# Patient Record
Sex: MALE | Race: WHITE | NOT HISPANIC OR LATINO | Employment: UNEMPLOYED | URBAN - METROPOLITAN AREA
[De-identification: names, ages, dates, MRNs, and addresses within clinical notes are randomized per-mention and may not be internally consistent; named-entity substitution may affect disease eponyms.]

---

## 2021-09-24 ENCOUNTER — APPOINTMENT (EMERGENCY)
Dept: RADIOLOGY | Facility: HOSPITAL | Age: 61
End: 2021-09-24

## 2021-09-24 ENCOUNTER — HOSPITAL ENCOUNTER (OUTPATIENT)
Facility: HOSPITAL | Age: 61
Setting detail: OBSERVATION
Discharge: HOME | End: 2021-09-25
Attending: EMERGENCY MEDICINE | Admitting: INTERNAL MEDICINE

## 2021-09-24 DIAGNOSIS — R06.03 RESPIRATORY DISTRESS: ICD-10-CM

## 2021-09-24 DIAGNOSIS — I83.93 VARICOSE VEINS OF BOTH LOWER EXTREMITIES, UNSPECIFIED WHETHER COMPLICATED: ICD-10-CM

## 2021-09-24 DIAGNOSIS — J45.909 REACTIVE AIRWAY DISEASE: Primary | ICD-10-CM

## 2021-09-24 DIAGNOSIS — J20.9 ACUTE BRONCHITIS, UNSPECIFIED ORGANISM: ICD-10-CM

## 2021-09-24 PROBLEM — F20.9 SCHIZOPHRENIA (HCC): Status: ACTIVE | Noted: 2021-09-24

## 2021-09-24 PROBLEM — E87.6 HYPOKALEMIA: Status: ACTIVE | Noted: 2021-09-24

## 2021-09-24 PROBLEM — D72.829 LEUKOCYTOSIS: Status: ACTIVE | Noted: 2021-09-24

## 2021-09-24 LAB
ANION GAP SERPL CALCULATED.3IONS-SCNC: 8 MMOL/L (ref 4–13)
BASOPHILS # BLD AUTO: 0.1 THOUSANDS/ΜL (ref 0–0.1)
BASOPHILS NFR BLD AUTO: 1 % (ref 0–1)
BUN SERPL-MCNC: 15 MG/DL (ref 5–25)
CALCIUM SERPL-MCNC: 9 MG/DL (ref 8.3–10.1)
CHLORIDE SERPL-SCNC: 102 MMOL/L (ref 100–108)
CO2 SERPL-SCNC: 30 MMOL/L (ref 21–32)
CREAT SERPL-MCNC: 1.14 MG/DL (ref 0.6–1.3)
EOSINOPHIL # BLD AUTO: 0.42 THOUSAND/ΜL (ref 0–0.61)
EOSINOPHIL NFR BLD AUTO: 3 % (ref 0–6)
ERYTHROCYTE [DISTWIDTH] IN BLOOD BY AUTOMATED COUNT: 13.4 % (ref 11.6–15.1)
GFR SERPL CREATININE-BSD FRML MDRD: 69 ML/MIN/1.73SQ M
GLUCOSE SERPL-MCNC: 104 MG/DL (ref 65–140)
HCT VFR BLD AUTO: 45.5 % (ref 36.5–49.3)
HGB BLD-MCNC: 15.4 G/DL (ref 12–17)
IMM GRANULOCYTES # BLD AUTO: 0.04 THOUSAND/UL (ref 0–0.2)
IMM GRANULOCYTES NFR BLD AUTO: 0 % (ref 0–2)
LYMPHOCYTES # BLD AUTO: 2.41 THOUSANDS/ΜL (ref 0.6–4.47)
LYMPHOCYTES NFR BLD AUTO: 20 % (ref 14–44)
MAGNESIUM SERPL-MCNC: 2.1 MG/DL (ref 1.6–2.6)
MCH RBC QN AUTO: 31.6 PG (ref 26.8–34.3)
MCHC RBC AUTO-ENTMCNC: 33.8 G/DL (ref 31.4–37.4)
MCV RBC AUTO: 93 FL (ref 82–98)
MONOCYTES # BLD AUTO: 1.46 THOUSAND/ΜL (ref 0.17–1.22)
MONOCYTES NFR BLD AUTO: 12 % (ref 4–12)
NEUTROPHILS # BLD AUTO: 7.87 THOUSANDS/ΜL (ref 1.85–7.62)
NEUTS SEG NFR BLD AUTO: 64 % (ref 43–75)
NRBC BLD AUTO-RTO: 0 /100 WBCS
PLATELET # BLD AUTO: 348 THOUSANDS/UL (ref 149–390)
PMV BLD AUTO: 10.1 FL (ref 8.9–12.7)
POTASSIUM SERPL-SCNC: 3.1 MMOL/L (ref 3.5–5.3)
RBC # BLD AUTO: 4.87 MILLION/UL (ref 3.88–5.62)
SARS-COV-2 RNA RESP QL NAA+PROBE: NEGATIVE
SODIUM SERPL-SCNC: 140 MMOL/L (ref 136–145)
TROPONIN I SERPL-MCNC: <0.02 NG/ML
WBC # BLD AUTO: 12.3 THOUSAND/UL (ref 4.31–10.16)

## 2021-09-24 PROCEDURE — 71045 X-RAY EXAM CHEST 1 VIEW: CPT

## 2021-09-24 PROCEDURE — 96374 THER/PROPH/DIAG INJ IV PUSH: CPT

## 2021-09-24 PROCEDURE — 83735 ASSAY OF MAGNESIUM: CPT | Performed by: PHYSICIAN ASSISTANT

## 2021-09-24 PROCEDURE — 93005 ELECTROCARDIOGRAM TRACING: CPT

## 2021-09-24 PROCEDURE — 99285 EMERGENCY DEPT VISIT HI MDM: CPT | Performed by: EMERGENCY MEDICINE

## 2021-09-24 PROCEDURE — 84484 ASSAY OF TROPONIN QUANT: CPT | Performed by: EMERGENCY MEDICINE

## 2021-09-24 PROCEDURE — 80048 BASIC METABOLIC PNL TOTAL CA: CPT | Performed by: EMERGENCY MEDICINE

## 2021-09-24 PROCEDURE — U0003 INFECTIOUS AGENT DETECTION BY NUCLEIC ACID (DNA OR RNA); SEVERE ACUTE RESPIRATORY SYNDROME CORONAVIRUS 2 (SARS-COV-2) (CORONAVIRUS DISEASE [COVID-19]), AMPLIFIED PROBE TECHNIQUE, MAKING USE OF HIGH THROUGHPUT TECHNOLOGIES AS DESCRIBED BY CMS-2020-01-R: HCPCS | Performed by: EMERGENCY MEDICINE

## 2021-09-24 PROCEDURE — 94640 AIRWAY INHALATION TREATMENT: CPT

## 2021-09-24 PROCEDURE — 99220 PR INITIAL OBSERVATION CARE/DAY 70 MINUTES: CPT | Performed by: PHYSICIAN ASSISTANT

## 2021-09-24 PROCEDURE — 99285 EMERGENCY DEPT VISIT HI MDM: CPT

## 2021-09-24 PROCEDURE — U0005 INFEC AGEN DETEC AMPLI PROBE: HCPCS | Performed by: EMERGENCY MEDICINE

## 2021-09-24 PROCEDURE — 94760 N-INVAS EAR/PLS OXIMETRY 1: CPT

## 2021-09-24 PROCEDURE — 36415 COLL VENOUS BLD VENIPUNCTURE: CPT | Performed by: EMERGENCY MEDICINE

## 2021-09-24 PROCEDURE — 85025 COMPLETE CBC W/AUTO DIFF WBC: CPT | Performed by: EMERGENCY MEDICINE

## 2021-09-24 RX ORDER — POTASSIUM CHLORIDE 20 MEQ/1
40 TABLET, EXTENDED RELEASE ORAL ONCE
Status: COMPLETED | OUTPATIENT
Start: 2021-09-24 | End: 2021-09-24

## 2021-09-24 RX ORDER — ALBUTEROL SULFATE 90 UG/1
8 AEROSOL, METERED RESPIRATORY (INHALATION) ONCE
Status: COMPLETED | OUTPATIENT
Start: 2021-09-24 | End: 2021-09-24

## 2021-09-24 RX ORDER — AZITHROMYCIN 500 MG/1
500 TABLET, FILM COATED ORAL EVERY 24 HOURS
Status: DISCONTINUED | OUTPATIENT
Start: 2021-09-24 | End: 2021-09-25 | Stop reason: HOSPADM

## 2021-09-24 RX ORDER — LEVALBUTEROL 1.25 MG/.5ML
1.25 SOLUTION, CONCENTRATE RESPIRATORY (INHALATION) 3 TIMES DAILY
Status: DISCONTINUED | OUTPATIENT
Start: 2021-09-25 | End: 2021-09-25 | Stop reason: HOSPADM

## 2021-09-24 RX ORDER — HYDRALAZINE HYDROCHLORIDE 20 MG/ML
5 INJECTION INTRAMUSCULAR; INTRAVENOUS ONCE
Status: COMPLETED | OUTPATIENT
Start: 2021-09-25 | End: 2021-09-25

## 2021-09-24 RX ORDER — ACETAMINOPHEN 325 MG/1
650 TABLET ORAL EVERY 6 HOURS PRN
Status: DISCONTINUED | OUTPATIENT
Start: 2021-09-24 | End: 2021-09-25 | Stop reason: HOSPADM

## 2021-09-24 RX ORDER — CALCIUM CARBONATE 200(500)MG
1000 TABLET,CHEWABLE ORAL DAILY PRN
Status: DISCONTINUED | OUTPATIENT
Start: 2021-09-24 | End: 2021-09-25 | Stop reason: HOSPADM

## 2021-09-24 RX ORDER — METHYLPREDNISOLONE SODIUM SUCCINATE 125 MG/2ML
125 INJECTION, POWDER, LYOPHILIZED, FOR SOLUTION INTRAMUSCULAR; INTRAVENOUS ONCE
Status: COMPLETED | OUTPATIENT
Start: 2021-09-24 | End: 2021-09-24

## 2021-09-24 RX ORDER — SODIUM CHLORIDE 9 MG/ML
3 INJECTION INTRAVENOUS
Status: DISCONTINUED | OUTPATIENT
Start: 2021-09-24 | End: 2021-09-25 | Stop reason: HOSPADM

## 2021-09-24 RX ORDER — ONDANSETRON 2 MG/ML
4 INJECTION INTRAMUSCULAR; INTRAVENOUS EVERY 6 HOURS PRN
Status: DISCONTINUED | OUTPATIENT
Start: 2021-09-24 | End: 2021-09-25 | Stop reason: HOSPADM

## 2021-09-24 RX ORDER — SENNOSIDES 8.6 MG
1 TABLET ORAL
Status: DISCONTINUED | OUTPATIENT
Start: 2021-09-24 | End: 2021-09-25 | Stop reason: HOSPADM

## 2021-09-24 RX ORDER — BENZONATATE 100 MG/1
100 CAPSULE ORAL 3 TIMES DAILY PRN
Status: DISCONTINUED | OUTPATIENT
Start: 2021-09-24 | End: 2021-09-25 | Stop reason: HOSPADM

## 2021-09-24 RX ORDER — PREDNISONE 20 MG/1
40 TABLET ORAL DAILY
Status: DISCONTINUED | OUTPATIENT
Start: 2021-09-25 | End: 2021-09-25 | Stop reason: HOSPADM

## 2021-09-24 RX ORDER — LEVALBUTEROL INHALATION SOLUTION 0.63 MG/3ML
0.63 SOLUTION RESPIRATORY (INHALATION)
Status: DISCONTINUED | OUTPATIENT
Start: 2021-09-24 | End: 2021-09-24

## 2021-09-24 RX ADMIN — METHYLPREDNISOLONE SODIUM SUCCINATE 125 MG: 125 INJECTION, POWDER, FOR SOLUTION INTRAMUSCULAR; INTRAVENOUS at 20:24

## 2021-09-24 RX ADMIN — LEVALBUTEROL HYDROCHLORIDE 0.63 MG: 0.63 SOLUTION RESPIRATORY (INHALATION) at 23:26

## 2021-09-24 RX ADMIN — ALBUTEROL SULFATE 8 PUFF: 90 AEROSOL, METERED RESPIRATORY (INHALATION) at 20:25

## 2021-09-24 RX ADMIN — POTASSIUM CHLORIDE 40 MEQ: 1500 TABLET, EXTENDED RELEASE ORAL at 20:26

## 2021-09-24 RX ADMIN — AZITHROMYCIN 500 MG: 500 TABLET, FILM COATED ORAL at 23:43

## 2021-09-24 RX ADMIN — POTASSIUM CHLORIDE 40 MEQ: 1500 TABLET, EXTENDED RELEASE ORAL at 23:43

## 2021-09-25 VITALS
HEART RATE: 79 BPM | BODY MASS INDEX: 32.14 KG/M2 | DIASTOLIC BLOOD PRESSURE: 83 MMHG | OXYGEN SATURATION: 93 % | RESPIRATION RATE: 20 BRPM | WEIGHT: 200 LBS | TEMPERATURE: 98.2 F | SYSTOLIC BLOOD PRESSURE: 147 MMHG | HEIGHT: 66 IN

## 2021-09-25 PROBLEM — I83.93 VARICOSE VEINS OF BOTH LOWER EXTREMITIES: Status: ACTIVE | Noted: 2021-09-25

## 2021-09-25 LAB
ANION GAP SERPL CALCULATED.3IONS-SCNC: 12 MMOL/L (ref 4–13)
ATRIAL RATE: 84 BPM
BUN SERPL-MCNC: 12 MG/DL (ref 5–25)
CALCIUM SERPL-MCNC: 8.9 MG/DL (ref 8.3–10.1)
CHLORIDE SERPL-SCNC: 102 MMOL/L (ref 100–108)
CO2 SERPL-SCNC: 25 MMOL/L (ref 21–32)
CREAT SERPL-MCNC: 1.12 MG/DL (ref 0.6–1.3)
ERYTHROCYTE [DISTWIDTH] IN BLOOD BY AUTOMATED COUNT: 14.3 % (ref 11.6–15.1)
GFR SERPL CREATININE-BSD FRML MDRD: 70 ML/MIN/1.73SQ M
GLUCOSE P FAST SERPL-MCNC: 189 MG/DL (ref 65–99)
GLUCOSE SERPL-MCNC: 189 MG/DL (ref 65–140)
HCT VFR BLD AUTO: 46.5 % (ref 36.5–49.3)
HGB BLD-MCNC: 15.7 G/DL (ref 12–17)
MCH RBC QN AUTO: 31.4 PG (ref 26.8–34.3)
MCHC RBC AUTO-ENTMCNC: 33.8 G/DL (ref 31.4–37.4)
MCV RBC AUTO: 93 FL (ref 82–98)
P AXIS: 67 DEGREES
PLATELET # BLD AUTO: 344 THOUSANDS/UL (ref 149–390)
PMV BLD AUTO: 10.2 FL (ref 8.9–12.7)
POTASSIUM SERPL-SCNC: 4.1 MMOL/L (ref 3.5–5.3)
PR INTERVAL: 168 MS
QRS AXIS: 33 DEGREES
QRSD INTERVAL: 70 MS
QT INTERVAL: 386 MS
QTC INTERVAL: 456 MS
RBC # BLD AUTO: 5 MILLION/UL (ref 3.88–5.62)
SODIUM SERPL-SCNC: 139 MMOL/L (ref 136–145)
T WAVE AXIS: 60 DEGREES
VENTRICULAR RATE: 84 BPM
WBC # BLD AUTO: 8.37 THOUSAND/UL (ref 4.31–10.16)

## 2021-09-25 PROCEDURE — 94760 N-INVAS EAR/PLS OXIMETRY 1: CPT

## 2021-09-25 PROCEDURE — 80048 BASIC METABOLIC PNL TOTAL CA: CPT | Performed by: PHYSICIAN ASSISTANT

## 2021-09-25 PROCEDURE — 99217 PR OBSERVATION CARE DISCHARGE MANAGEMENT: CPT | Performed by: INTERNAL MEDICINE

## 2021-09-25 PROCEDURE — 94640 AIRWAY INHALATION TREATMENT: CPT

## 2021-09-25 PROCEDURE — 93010 ELECTROCARDIOGRAM REPORT: CPT | Performed by: INTERNAL MEDICINE

## 2021-09-25 PROCEDURE — 85027 COMPLETE CBC AUTOMATED: CPT | Performed by: PHYSICIAN ASSISTANT

## 2021-09-25 RX ORDER — BENZONATATE 100 MG/1
100 CAPSULE ORAL 3 TIMES DAILY PRN
Qty: 20 CAPSULE | Refills: 0 | Status: SHIPPED | OUTPATIENT
Start: 2021-09-25

## 2021-09-25 RX ORDER — AZITHROMYCIN 500 MG/1
500 TABLET, FILM COATED ORAL EVERY 24 HOURS
Qty: 2 TABLET | Refills: 0 | Status: SHIPPED | OUTPATIENT
Start: 2021-09-25 | End: 2021-09-27

## 2021-09-25 RX ORDER — METHYLPREDNISOLONE 4 MG/1
TABLET ORAL
Qty: 1 EACH | Refills: 0 | OUTPATIENT
Start: 2021-09-25 | End: 2022-07-16

## 2021-09-25 RX ADMIN — PREDNISONE 40 MG: 20 TABLET ORAL at 09:33

## 2021-09-25 RX ADMIN — LEVALBUTEROL HYDROCHLORIDE 1.25 MG: 1.25 SOLUTION, CONCENTRATE RESPIRATORY (INHALATION) at 08:50

## 2021-09-25 RX ADMIN — HYDRALAZINE HYDROCHLORIDE 5 MG: 20 INJECTION INTRAMUSCULAR; INTRAVENOUS at 00:21

## 2021-09-25 RX ADMIN — ENOXAPARIN SODIUM 40 MG: 100 INJECTION SUBCUTANEOUS at 09:33

## 2021-09-25 RX ADMIN — IPRATROPIUM BROMIDE 0.5 MG: 0.5 SOLUTION RESPIRATORY (INHALATION) at 08:50

## 2021-09-25 NOTE — ED NOTES
Patient ambulatory with no issues, denies SOB, requesting gum and water        Sherita Philip RN  09/24/21 2125

## 2021-09-25 NOTE — CASE MANAGEMENT
Call back from [facility]  She is asking about homeless shelters,  Will provide list per her request   Did explain she can call 211 and get assistance  Javid Hernandez asking about getting patient a psychiatrist provided with info link card to help get MD   Per Javid Hernandez she will  patient at 1130 as planned

## 2021-09-25 NOTE — ASSESSMENT & PLAN NOTE
· Sister called ED to inform staff that patient is schizophrenic and noncompliant with his medications, however she does not know his medications  · Pt denies any medication intake on admission   · He does exhibit flight of ideas on admission and grandiose thoughts

## 2021-09-25 NOTE — ASSESSMENT & PLAN NOTE
· Reports worsening of chronic cough x1 week with acute worsening x3 days   · Endorses nocturnal coughing fits and experiencing dyspnea s/p coughing fit while walking to his car from [employer] at 1800   · Reports feeling improved s/p albuterol inhaler x1 and solu-medrol 125mg in the ED but still has bibasilar expiratory wheezing - suspect inadequate inhaler usage   · Will start on prednisone 40mg daily, xopenex neb TID, azithromycin PO x3 days (due to concern for underlying chronic airway disease), and Tessalon Perles

## 2021-09-25 NOTE — PLAN OF CARE
Problem: Nutrition/Hydration-ADULT  Goal: Nutrient/Hydration intake appropriate for improving, restoring or maintaining nutritional needs  Description: Monitor and assess patient's nutrition/hydration status for malnutrition  Collaborate with interdisciplinary team and initiate plan and interventions as ordered  Monitor patient's weight and dietary intake as ordered or per policy  Utilize nutrition screening tool and intervene as necessary  Determine patient's food preferences and provide high-protein, high-caloric foods as appropriate       INTERVENTIONS:  - Monitor oral intake, urinary output, labs, and treatment plans  - Assess nutrition and hydration status and recommend course of action  - Evaluate amount of meals eaten  - Assist patient with eating if necessary   - Allow adequate time for meals  - Recommend/ encourage appropriate diets, oral nutritional supplements, and vitamin/mineral supplements  - Order, calculate, and assess calorie counts as needed  - Recommend, monitor, and adjust tube feedings and TPN/PPN based on assessed needs  - Assess need for intravenous fluids  - Provide specific nutrition/hydration education as appropriate  - Include patient/family/caregiver in decisions related to nutrition  Outcome: Progressing     Problem: PAIN - ADULT  Goal: Verbalizes/displays adequate comfort level or baseline comfort level  Description: Interventions:  - Encourage patient to monitor pain and request assistance  - Assess pain using appropriate pain scale  - Administer analgesics based on type and severity of pain and evaluate response  - Implement non-pharmacological measures as appropriate and evaluate response  - Consider cultural and social influences on pain and pain management  - Notify physician/advanced practitioner if interventions unsuccessful or patient reports new pain  Outcome: Progressing     Problem: INFECTION - ADULT  Goal: Absence or prevention of progression during hospitalization  Description: INTERVENTIONS:  - Assess and monitor for signs and symptoms of infection  - Monitor lab/diagnostic results  - Monitor all insertion sites, i e  indwelling lines, tubes, and drains  - Monitor endotracheal if appropriate and nasal secretions for changes in amount and color  - Dodge appropriate cooling/warming therapies per order  - Administer medications as ordered  - Instruct and encourage patient and family to use good hand hygiene technique  - Identify and instruct in appropriate isolation precautions for identified infection/condition  Outcome: Progressing  Goal: Absence of fever/infection during neutropenic period  Description: INTERVENTIONS:  - Monitor WBC    Outcome: Progressing     Problem: SAFETY ADULT  Goal: Patient will remain free of falls  Description: INTERVENTIONS:  - Educate patient/family on patient safety including physical limitations  - Instruct patient to call for assistance with activity   - Consult OT/PT to assist with strengthening/mobility   - Keep Call bell within reach  - Keep bed low and locked with side rails adjusted as appropriate  - Keep care items and personal belongings within reach  - Initiate and maintain comfort rounds  - Make Fall Risk Sign visible to staff  - Offer Toileting every 2 Hours, in advance of need  - Initiate/Maintain bed and chair alarm  - Obtain necessary fall risk management equipment: walker   - Apply yellow socks and bracelet for high fall risk patients  - Consider moving patient to room near nurses station  Outcome: Progressing  Goal: Maintain or return to baseline ADL function  Description: INTERVENTIONS:  -  Assess patient's ability to carry out ADLs; assess patient's baseline for ADL function and identify physical deficits which impact ability to perform ADLs (bathing, care of mouth/teeth, toileting, grooming, dressing, etc )  - Assess/evaluate cause of self-care deficits   - Assess range of motion  - Assess patient's mobility; develop plan if impaired  - Assess patient's need for assistive devices and provide as appropriate  - Encourage maximum independence but intervene and supervise when necessary  - Involve family in performance of ADLs  - Assess for home care needs following discharge   - Consider OT consult to assist with ADL evaluation and planning for discharge  - Provide patient education as appropriate  Outcome: Progressing  Goal: Maintains/Returns to pre admission functional level  Description: INTERVENTIONS:  - Perform BMAT or MOVE assessment daily    - Set and communicate daily mobility goal to care team and patient/family/caregiver  - Collaborate with rehabilitation services on mobility goals if consulted  - Perform Range of Motion 3 times a day  - Reposition patient every 2 hours    - Dangle patient 3 times a day  - Stand patient 3 times a day  - Ambulate patient 3 times a day  - Out of bed to chair 3 times a day   - Out of bed for meals 3 times a day  - Out of bed for toileting  - Record patient progress and toleration of activity level   Outcome: Progressing     Problem: DISCHARGE PLANNING  Goal: Discharge to home or other facility with appropriate resources  Description: INTERVENTIONS:  - Identify barriers to discharge w/patient and caregiver  - Arrange for needed discharge resources and transportation as appropriate  - Identify discharge learning needs (meds, wound care, etc )  - Arrange for interpretive services to assist at discharge as needed  - Refer to Case Management Department for coordinating discharge planning if the patient needs post-hospital services based on physician/advanced practitioner order or complex needs related to functional status, cognitive ability, or social support system  Outcome: Progressing     Problem: Knowledge Deficit  Goal: Patient/family/caregiver demonstrates understanding of disease process, treatment plan, medications, and discharge instructions  Description: Complete learning assessment and assess knowledge base    Interventions:  - Provide teaching at level of understanding  - Provide teaching via preferred learning methods  Outcome: Progressing

## 2021-09-25 NOTE — ASSESSMENT & PLAN NOTE
· Reports worsening of chronic cough x1 week with acute worsening x3 days   · Endorses nocturnal coughing fits and experiencing dyspnea s/p coughing fit while walking to his car from [facility] at 1800   · Reports feeling improved s/p albuterol inhaler x1 and solu-medrol 125mg in the ED but still has bibasilar expiratory wheezing - suspect inadequate inhaler usage   · Started on prednisone 40mg daily, xopenex neb TID, azithromycin PO x3 days (due to concern for underlying chronic airway disease), and Tessalon Perles   · Plan to discharge today - on medrol dose lyndon, oral azithromycin x 2 days (complete a 3 day course) and tessalon perles    · To follow up with his PCP

## 2021-09-25 NOTE — H&P
[place]  H&P- Lucius Sever 1960, 57 y o  male MRN: [id number]  Unit/Bed#: -01 Encounter: [id number]  Primary Care Provider: No primary care provider on file  Date and time admitted to hospital: 9/24/2021  6:56 PM    * Acute bronchitis  Assessment & Plan  · Reports worsening of chronic cough x1 week with acute worsening x3 days   · Endorses nocturnal coughing fits and experiencing dyspnea s/p coughing fit while walking to his car from [facility] at 1800   · Reports feeling improved s/p albuterol inhaler x1 and solu-medrol 125mg in the ED but still has bibasilar expiratory wheezing - suspect inadequate inhaler usage   · Will start on prednisone 40mg daily, xopenex neb TID, azithromycin PO x3 days (due to concern for underlying chronic airway disease), and Tessalon Perles     Suspected reactive airway disease  Assessment & Plan  · Hx of second hand smoke exposure as a child   · Reports chronic cough of white/yellow phlegm   · Expiratory wheezing bibasilar lung fields   · Needs outpatient PFTs   · Will start on atrovent and xopenex TID     Hypokalemia  Assessment & Plan  · K at 3 1   · Given 40 PO in ED - will give additional 40   · Check Mg  · Check BMP in AM    Leukocytosis  Assessment & Plan  · WBC at 12 30K on admission   · Suspect secondary to acute bronchitis   · Start azithromycin  · Trend CBC    Schizophrenia ([facility] [address] )  Assessment & Plan  · Sister called ED to inform staff that patient is schizophrenic and noncompliant with his medications, however she does not know his medications  · Pt denies any medication intake on admission   · He does exhibit flight of ideas on admission and grandiose thoughts     VTE Pharmacologic Prophylaxis: VTE Score: 3 Moderate Risk (Score 3-4) - Pharmacological DVT Prophylaxis Ordered: enoxaparin (Lovenox)  Code Status: Level 1 - Full Code   Discussion with family: Attempted to update  (sister) via phone   Unable to contact  Anticipated Length of Stay: Patient will be admitted on an observation basis with an anticipated length of stay of less than 2 midnights secondary to acute bronchitis  Total Time for Visit, including Counseling / Coordination of Care: 45 minutes Greater than 50% of this total time spent on direct patient counseling and coordination of care  Chief Complaint: "I couldn't breathe"    History of Present Illness:  Anila Roche is a 57 y o  male with a PMH of schizophrenia who presents with the sensation that he could not breathe   Patient reports that he was walking out of [place] to his car at 1800 when he had a coughing fit with resultant difficulty breathing  Patient reports that he has a chronic cough of white/yellow phlegm that has increased in volume over the last 1 week  He reports that over the last 3 days he has had coughing fits, especially at night that awaken him from sleep 5-6 times  Upon sitting up the coughing improves  Endorses associated mild headache  No fever, chills, abdominal pain, nasal congestion, sinus pressure, nausea, vomiting, diarrhea, urinary symptoms, peripheral edema, or chest pain  Of note, pt reports he went to urgent care today and was diagnosed with bronchitis  He has not yet picked up his prescribed medications  He does report feeling anxious about having "to do maintenance" at his house  Review of Systems:  Review of Systems   Constitutional: Negative for chills and fever  HENT: Negative for congestion and sinus pressure  Respiratory: Positive for cough and shortness of breath  Cardiovascular: Negative for chest pain, palpitations and leg swelling  Gastrointestinal: Negative for abdominal pain, constipation, diarrhea, nausea and vomiting  Genitourinary: Negative for difficulty urinating, dysuria and hematuria  Neurological: Positive for headaches  Negative for weakness and numbness  All other systems reviewed and are negative        Past Medical and Surgical History:   Past Medical History:   Diagnosis Date    Deafness in right ear        Past Surgical History:   Procedure Laterality Date    SPLENECTOMY, TOTAL         Meds/Allergies:  Prior to Admission medications    Not on File     I have reviewed home medications with patient personally  Allergies: No Known Allergies    Social History:  Marital Status: Single   Occupation: semi-retired per patient   Patient Pre-hospital Living Situation: Home, Alone  Patient Pre-hospital Level of Mobility: walks  Patient Pre-hospital Diet Restrictions: none   Substance Use History:   Social History     Substance and Sexual Activity   Alcohol Use Not Currently     Social History     Tobacco Use   Smoking Status Former Smoker    Quit date: 1987    Years since quittin 0   Smokeless Tobacco Never Used     Social History     Substance and Sexual Activity   Drug Use Not Currently       Family History:  History reviewed  No pertinent family history  Physical Exam:     Vitals:   Blood Pressure: (!) 174/105 (21)  Pulse: 79 (21)  Temperature: 98 °F (36 7 °C) (21)  Temp Source: Temporal (21 1858)  Respirations: 20 (21)  Height: 5' 6" (167 6 cm) (21)  Weight - Scale: 90 7 kg (200 lb) (21)  SpO2: 94 % (21)    Physical Exam  Vitals and nursing note reviewed  Constitutional:       Appearance: Normal appearance  HENT:      Head: Normocephalic  Nose: Nose normal       Mouth/Throat:      Mouth: Mucous membranes are moist    Eyes:      Extraocular Movements: Extraocular movements intact  Conjunctiva/sclera: Conjunctivae normal    Cardiovascular:      Rate and Rhythm: Normal rate and regular rhythm  Pulses: Normal pulses  Heart sounds: No murmur heard  Pulmonary:      Effort: Pulmonary effort is normal  No respiratory distress  Breath sounds: Wheezing (bibasilar, expiratory) present  No rhonchi or rales  Abdominal:      General: Abdomen is flat  Palpations: Abdomen is soft  Tenderness: There is no abdominal tenderness  Musculoskeletal:         General: Normal range of motion  Cervical back: Normal range of motion  Right lower leg: No edema  Left lower leg: No edema  Skin:     General: Skin is warm and dry  Neurological:      General: No focal deficit present  Mental Status: He is alert and oriented to person, place, and time  Psychiatric:      Comments: Anxious mood  Flight of ideas  Grandiose thoughts  Additional Data:     Lab Results:  Results from last 7 days   Lab Units 09/24/21  1914   WBC Thousand/uL 12 30*   HEMOGLOBIN g/dL 15 4   HEMATOCRIT % 45 5   PLATELETS Thousands/uL 348   NEUTROS PCT % 64   LYMPHS PCT % 20   MONOS PCT % 12   EOS PCT % 3     Results from last 7 days   Lab Units 09/24/21  1914   SODIUM mmol/L 140   POTASSIUM mmol/L 3 1*   CHLORIDE mmol/L 102   CO2 mmol/L 30   BUN mg/dL 15   CREATININE mg/dL 1 14   ANION GAP mmol/L 8   CALCIUM mg/dL 9 0   GLUCOSE RANDOM mg/dL 104                       Imaging: Personally reviewed the following imaging: chest xray  X-ray chest 1 view portable    (Results Pending)       EKG and Other Studies Reviewed on Admission:   · EKG: NSR  HR 84 bpm  Slightly prolonged QT at 456ms  No acute ischemia       ** Please Note: This note has been constructed using a voice recognition system   **

## 2021-09-25 NOTE — ASSESSMENT & PLAN NOTE
Chronic bilateral varicose veins  Not inflamed, tender neither does it appear infected  No intervention warranted inpatient  Patient would like to follow up with surgery outpatient - ambulatory referral to surgery provided

## 2021-09-25 NOTE — ASSESSMENT & PLAN NOTE
· WBC at 12 30K on admission secondary to acute bronchitis   · On azithromycin  · Leucocytosis resolved, wbc now 8 37  · Trend CBC

## 2021-09-25 NOTE — ASSESSMENT & PLAN NOTE
· Sister called ED to inform staff that patient is schizophrenic and noncompliant with his medications, however she does not know his medications  · Pt denies any medication intake on admission   · Patient AO x 3 this am, does not appear psychotic, seems misguided in thinking all his chronic issues (Left lateral back scab vs mole, varicose veins and h/o hoarding) would be solved inpatient, but no overt psychotic issues identified

## 2021-09-25 NOTE — ASSESSMENT & PLAN NOTE
· WBC at 12 30K on admission   · Suspect secondary to acute bronchitis   · Start azithromycin  · Trend CBC

## 2021-09-25 NOTE — CASE MANAGEMENT
Call back from sister [employer]  Updated that patient cleared medically for discharge  Discussed patient does not want to return to apt discussed [id number] to family's home vs Shelter  Per sister patient can return to apartment and she will transport him at 1130  Update to DR MARSHALL/ Nurse Leland Biggs and patient

## 2021-09-25 NOTE — ASSESSMENT & PLAN NOTE
· Hx of second hand smoke exposure as a child   · Reports chronic cough of white/yellow phlegm   · Expiratory wheezing bibasilar lung fields   · Needs outpatient PFTs   · Will start on atrovent and xopenex TID

## 2021-09-25 NOTE — ED PROVIDER NOTES
History  Chief Complaint   Patient presents with    Respiratory Distress     SOB for a week     Patient is a 70-year-old male with history of schizophrenia that presents for evaluation of dyspnea  Patient says that over the past week he has had worsening exertional dyspnea associated with cough productive of white/yellow sputum  He also endorses rhinorrhea and congestion  He endorses chest tightness and wheezing as well  No known history of COPD or asthma  Patient is a nonsmoker  He does not use inhalers or steroids  Patient denies chest pain at this time  Denies nausea vomiting or diaphoresis  Patient had previous COVID test several days ago that was negative  Otherwise denies abdominal pain, urinary or bowel symptoms  Denies PE or DVT risk factors  No cardiac history  He denies fevers, chills rigors  Patient's symptoms have been worsening he has not been taking anything for symptoms  None       Past Medical History:   Diagnosis Date    Deafness in right ear        Past Surgical History:   Procedure Laterality Date    SPLENECTOMY, TOTAL         History reviewed  No pertinent family history  I have reviewed and agree with the history as documented  E-Cigarette/Vaping    E-Cigarette Use Never User      E-Cigarette/Vaping Substances    Nicotine No     THC No     CBD No     Flavoring No     Other No     Unknown No      Social History     Tobacco Use    Smoking status: Former Smoker     Quit date: 1987     Years since quittin 0    Smokeless tobacco: Never Used   Vaping Use    Vaping Use: Never used   Substance Use Topics    Alcohol use: Not Currently    Drug use: Not Currently       Review of Systems   Constitutional: Positive for fatigue  Negative for fever  HENT: Positive for congestion and rhinorrhea  Negative for sore throat  Eyes: Negative for photophobia  Respiratory: Positive for cough, chest tightness and shortness of breath      Cardiovascular: Negative for chest pain  Gastrointestinal: Negative for abdominal pain  Genitourinary: Negative for dysuria  Musculoskeletal: Negative for back pain  Skin: Negative for rash  Neurological: Negative for light-headedness  Hematological: Negative for adenopathy  Psychiatric/Behavioral: Negative for agitation  All other systems reviewed and are negative  Physical Exam  Physical Exam  Vitals reviewed  Constitutional:       General: He is not in acute distress  Appearance: He is well-developed  HENT:      Head: Normocephalic  Eyes:      Pupils: Pupils are equal, round, and reactive to light  Cardiovascular:      Rate and Rhythm: Normal rate and regular rhythm  Heart sounds: Normal heart sounds  No murmur heard  No friction rub  No gallop  Pulmonary:      Comments: Tachypnea, significant expiratory/inspiratory wheezing heard throughout  Abdominal:      General: Bowel sounds are normal  There is no distension  Palpations: Abdomen is soft  Tenderness: There is no abdominal tenderness  There is no guarding  Musculoskeletal:         General: Normal range of motion  Cervical back: Normal range of motion and neck supple  Skin:     Capillary Refill: Capillary refill takes less than 2 seconds  Neurological:      Mental Status: He is alert and oriented to person, place, and time  Cranial Nerves: No cranial nerve deficit  Sensory: No sensory deficit  Motor: No abnormal muscle tone  Psychiatric:         Behavior: Behavior normal          Thought Content:  Thought content normal          Judgment: Judgment normal          Vital Signs  ED Triage Vitals   Temperature Pulse Respirations Blood Pressure SpO2   09/24/21 1858 09/24/21 1858 09/24/21 1858 09/24/21 1858 09/24/21 1858   98 4 °F (36 9 °C) 94 (!) 24 (!) 220/116 94 %      Temp Source Heart Rate Source Patient Position - Orthostatic VS BP Location FiO2 (%)   09/24/21 1858 09/24/21 1858 09/24/21 1858 09/24/21 1858 --   Temporal Monitor Lying Left arm       Pain Score       09/24/21 2215       2           Vitals:    09/24/21 2244 09/24/21 2350 09/25/21 0134 09/25/21 1039   BP: (!) 174/105 (!) 172/104 148/84 147/83   Pulse: 79      Patient Position - Orthostatic VS:  Sitting Lying          Visual Acuity  Visual Acuity      Most Recent Value   L Pupil Size (mm)  3   R Pupil Size (mm)  3          ED Medications  Medications   albuterol (PROVENTIL HFA,VENTOLIN HFA) inhaler 8 puff (8 puffs Inhalation Given 9/24/21 2025)   methylPREDNISolone sodium succinate (Solu-MEDROL) injection 125 mg (125 mg Intravenous Given 9/24/21 2024)   potassium chloride (K-DUR,KLOR-CON) CR tablet 40 mEq (40 mEq Oral Given 9/24/21 2026)   potassium chloride (K-DUR,KLOR-CON) CR tablet 40 mEq (40 mEq Oral Given 9/24/21 2343)   hydrALAZINE (APRESOLINE) injection 5 mg (5 mg Intravenous Given 9/25/21 0021)       Diagnostic Studies  Results Reviewed     Procedure Component Value Units Date/Time    Magnesium [[id number]]  (Normal) Collected: 09/24/21 1914    Lab Status: Final result Specimen: Blood from Arm, Right Updated: 09/24/21 2335     Magnesium 2 1 mg/dL     Novel Coronavirus (Covid-19),PCR [facility] [[id number]]  (Normal) Collected: 09/24/21 1914    Lab Status: Final result Specimen: Nares from Nose Updated: 09/24/21 2040     SARS-CoV-2 Negative    Narrative:      FOR PEDIATRIC PATIENTS - copy/paste COVID Guidelines URL to browser: [email or web address]    The specimen collection materials, transport medium, and/or testing methodology utilized in the production of these test results have been proven to be reliable in a limited validation with an abbreviated program under the Emergency Utilization Authorization provided by the FDA  Testing reported as "Presumptive positive" will be confirmed with secondary testing to ensure result accuracy    Clinical caution and judgement should be used with the interpretation of these results with consideration of the clinical impression and other laboratory testing  Testing reported as "Positive" or "Negative" has been proven to be accurate according to standard laboratory validation requirements  All testing is performed with control materials showing appropriate reactivity at standard intervals      Troponin I [[id number]]  (Normal) Collected: 09/24/21 1914    Lab Status: Final result Specimen: Blood from Arm, Right Updated: 09/24/21 1948     Troponin I <0 02 ng/mL     Basic metabolic panel [[id number]]  (Abnormal) Collected: 09/24/21 1914    Lab Status: Final result Specimen: Blood from Arm, Right Updated: 09/24/21 1938     Sodium 140 mmol/L      Potassium 3 1 mmol/L      Chloride 102 mmol/L      CO2 30 mmol/L      ANION GAP 8 mmol/L      BUN 15 mg/dL      Creatinine 1 14 mg/dL      Glucose 104 mg/dL      Calcium 9 0 mg/dL      eGFR 69 ml/min/1 73sq m     Narrative:      [place] guidelines for Chronic Kidney Disease (CKD):     Stage 1 with normal or high GFR (GFR > 90 mL/min/1 73 square meters)    Stage 2 Mild CKD (GFR = 60-89 mL/min/1 73 square meters)    Stage 3A Moderate CKD (GFR = 45-59 mL/min/1 73 square meters)    Stage 3B Moderate CKD (GFR = 30-44 mL/min/1 73 square meters)    Stage 4 Severe CKD (GFR = 15-29 mL/min/1 73 square meters)    Stage 5 End Stage CKD (GFR <15 mL/min/1 73 square meters)  Note: GFR calculation is accurate only with a steady state creatinine    CBC and differential [[id number]]  (Abnormal) Collected: 09/24/21 1914    Lab Status: Final result Specimen: Blood from Arm, Right Updated: 09/24/21 1924     WBC 12 30 Thousand/uL      RBC 4 87 Million/uL      Hemoglobin 15 4 g/dL      Hematocrit 45 5 %      MCV 93 fL      MCH 31 6 pg      MCHC 33 8 g/dL      RDW 13 4 %      MPV 10 1 fL      Platelets 297 Thousands/uL      nRBC 0 /100 WBCs      Neutrophils Relative 64 %      Immat GRANS % 0 %      Lymphocytes Relative 20 %      Monocytes Relative 12 %      Eosinophils Relative 3 %      Basophils Relative 1 %      Neutrophils Absolute 7 87 Thousands/µL      Immature Grans Absolute 0 04 Thousand/uL      Lymphocytes Absolute 2 41 Thousands/µL      Monocytes Absolute 1 46 Thousand/µL      Eosinophils Absolute 0 42 Thousand/µL      Basophils Absolute 0 10 Thousands/µL                  X-ray chest 1 view portable   Final Result by Kristofer Ray MD (09/25 5824)      No acute cardiopulmonary disease  Workstation performed: [id number]                    Procedures  ECG 12 Lead Documentation Only    Date/Time: 9/24/2021 10:00 PM  Performed by: Michelet Ramon MD  Authorized by: Michelet Ramon MD     ECG reviewed by me, the ED Provider: yes    Patient location:  ED  Previous ECG:     Previous ECG:  Unavailable    Comparison to cardiac monitor: Yes    Interpretation:     Interpretation: normal    Rate:     ECG rate assessment: normal    Rhythm:     Rhythm: sinus rhythm    Ectopy:     Ectopy: none    QRS:     QRS axis:  Normal    QRS intervals:  Normal  Conduction:     Conduction: normal    ST segments:     ST segments:  Normal  T waves:     T waves: normal               ED Course  ED Course as of Sep 25 1716   Fri Sep 24, 2021   2022 Ambulatory pulse ox 94%                                              MDM  Number of Diagnoses or Management Options  Reactive airway disease  Respiratory distress  Diagnosis management comments: Patient is a 50-year-old male who presents for evaluation of dyspnea found have likely reactive airway disease  Patient with significant wheezing and increased work of breathing upon my initial assessment  Patient improved albuterol and steroids but not point to feel comfortable discharging him at this time  Patient has no known history of reactive air disease does not have any medications at home    Secondary to his history of schizophrenia, believe the patient will require observation admission for clinical care coordination improvement symptoms  Disposition  Final diagnoses:   Reactive airway disease   Respiratory distress     Time reflects when diagnosis was documented in both MDM as applicable and the Disposition within this note     Time User Action Codes Description Comment    9/24/2021  9:24 PM Leanna Howell Add [Z31 631] Reactive airway disease     9/24/2021  9:24 PM Leanna Howell Add [R06 03] Respiratory distress     9/25/2021 10:53 AM Emeka Sailors Add [J20 9] Acute bronchitis, unspecified organism     9/25/2021 11:07 AM Emeka Sailors Add [I83 93] Varicose veins of both lower extremities, unspecified whether complicated     6/52/2956 11:21 AM Emeka Sailors Modify [I83 93] Varicose veins of both lower extremities, unspecified whether complicated       ED Disposition     ED Disposition Condition Date/Time Comment    Admit Stable Fri Sep 24, 2021  9:24 PM Case was discussed with ARIAN and the patient's admission status was agreed to be Admission Status: observation status to the service of Dr Nelia Saenz           Follow-up Information     Follow up With Specialties Details Why Contact Info    PCP of choice  Schedule an appointment as soon as possible for a visit in 1 week(s)            Discharge Medication List as of 9/25/2021 11:21 AM      START taking these medications    Details   azithromycin (ZITHROMAX) 500 MG tablet Take 1 tablet (500 mg total) by mouth every 24 hours for 2 doses, Starting Sat 9/25/2021, Until Mon 9/27/2021, Normal      benzonatate (TESSALON PERLES) 100 mg capsule Take 1 capsule (100 mg total) by mouth 3 (three) times a day as needed for cough, Starting Sat 9/25/2021, Normal      methylPREDNISolone 4 MG tablet therapy pack Use as directed on package, Normal               PDMP Review     None          ED Provider  Electronically Signed by           Bernadine Pruitt MD  09/25/21 4961

## 2021-09-25 NOTE — PLAN OF CARE
Problem: Nutrition/Hydration-ADULT  Goal: Nutrient/Hydration intake appropriate for improving, restoring or maintaining nutritional needs  Description: Monitor and assess patient's nutrition/hydration status for malnutrition  Collaborate with interdisciplinary team and initiate plan and interventions as ordered  Monitor patient's weight and dietary intake as ordered or per policy  Utilize nutrition screening tool and intervene as necessary  Determine patient's food preferences and provide high-protein, high-caloric foods as appropriate       INTERVENTIONS:  - Monitor oral intake, urinary output, labs, and treatment plans  - Assess nutrition and hydration status and recommend course of action  - Evaluate amount of meals eaten  - Assist patient with eating if necessary   - Allow adequate time for meals  - Recommend/ encourage appropriate diets, oral nutritional supplements, and vitamin/mineral supplements  - Order, calculate, and assess calorie counts as needed  - Recommend, monitor, and adjust tube feedings and TPN/PPN based on assessed needs  - Assess need for intravenous fluids  - Provide specific nutrition/hydration education as appropriate  - Include patient/family/caregiver in decisions related to nutrition  9/25/2021 1120 by Candice Lemus RN  Outcome: Adequate for Discharge  9/25/2021 1024 by Candice Lemus RN  Outcome: Progressing     Problem: PAIN - ADULT  Goal: Verbalizes/displays adequate comfort level or baseline comfort level  Description: Interventions:  - Encourage patient to monitor pain and request assistance  - Assess pain using appropriate pain scale  - Administer analgesics based on type and severity of pain and evaluate response  - Implement non-pharmacological measures as appropriate and evaluate response  - Consider cultural and social influences on pain and pain management  - Notify physician/advanced practitioner if interventions unsuccessful or patient reports new pain  9/25/2021 1120 by Niraj Kendall RN  Outcome: Adequate for Discharge  9/25/2021 1024 by Niraj Kendall RN  Outcome: Progressing     Problem: INFECTION - ADULT  Goal: Absence or prevention of progression during hospitalization  Description: INTERVENTIONS:  - Assess and monitor for signs and symptoms of infection  - Monitor lab/diagnostic results  - Monitor all insertion sites, i e  indwelling lines, tubes, and drains  - Monitor endotracheal if appropriate and nasal secretions for changes in amount and color  - [place] appropriate cooling/warming therapies per order  - Administer medications as ordered  - Instruct and encourage patient and family to use good hand hygiene technique  - Identify and instruct in appropriate isolation precautions for identified infection/condition  9/25/2021 1120 by Niraj Kendall RN  Outcome: Adequate for Discharge  9/25/2021 1024 by Niraj Kendall RN  Outcome: Progressing  Goal: Absence of fever/infection during neutropenic period  Description: INTERVENTIONS:  - Monitor WBC    9/25/2021 1120 by Niraj Kendall RN  Outcome: Adequate for Discharge  9/25/2021 1024 by Niraj Kendall RN  Outcome: Progressing     Problem: SAFETY ADULT  Goal: Patient will remain free of falls  Description: INTERVENTIONS:  - Educate patient/family on patient safety including physical limitations  - Instruct patient to call for assistance with activity   - Consult OT/PT to assist with strengthening/mobility   - Keep Call bell within reach  - Keep bed low and locked with side rails adjusted as appropriate  - Keep care items and personal belongings within reach  - Initiate and maintain comfort rounds  - Make Fall Risk Sign visible to staff  - Offer Toileting every 2 Hours, in advance of need  - Initiate/Maintain bed and chair alarm  - Obtain necessary fall risk management equipment: walker   - Apply yellow socks and bracelet for high fall risk patients  - Consider moving patient to room near nurses station  9/25/2021 1120 by Dennie Charters Madie Mohs, RN  Outcome: Adequate for Discharge  9/25/2021 1024 by Yesi Horton RN  Outcome: Progressing  Goal: Maintain or return to baseline ADL function  Description: INTERVENTIONS:  -  Assess patient's ability to carry out ADLs; assess patient's baseline for ADL function and identify physical deficits which impact ability to perform ADLs (bathing, care of mouth/teeth, toileting, grooming, dressing, etc )  - Assess/evaluate cause of self-care deficits   - Assess range of motion  - Assess patient's mobility; develop plan if impaired  - Assess patient's need for assistive devices and provide as appropriate  - Encourage maximum independence but intervene and supervise when necessary  - Involve family in performance of ADLs  - Assess for home care needs following discharge   - Consider OT consult to assist with ADL evaluation and planning for discharge  - Provide patient education as appropriate  9/25/2021 1120 by Yesi Horton RN  Outcome: Adequate for Discharge  9/25/2021 1024 by Yesi Horton RN  Outcome: Progressing  Goal: Maintains/Returns to pre admission functional level  Description: INTERVENTIONS:  - Perform BMAT or MOVE assessment daily    - Set and communicate daily mobility goal to care team and patient/family/caregiver  - Collaborate with rehabilitation services on mobility goals if consulted  - Perform Range of Motion 3 times a day  - Reposition patient every 2 hours    - Dangle patient 3 times a day  - Stand patient 3 times a day  - Ambulate patient 3 times a day  - Out of bed to chair 3 times a day   - Out of bed for meals 3 times a day  - Out of bed for toileting  - Record patient progress and toleration of activity level   9/25/2021 1120 by Yesi Horton RN  Outcome: Adequate for Discharge  9/25/2021 1024 by Yesi Horton RN  Outcome: Progressing     Problem: DISCHARGE PLANNING  Goal: Discharge to home or other facility with appropriate resources  Description: INTERVENTIONS:  - Identify barriers to discharge w/patient and caregiver  - Arrange for needed discharge resources and transportation as appropriate  - Identify discharge learning needs (meds, wound care, etc )  - Arrange for interpretive services to assist at discharge as needed  - Refer to Case Management Department for coordinating discharge planning if the patient needs post-hospital services based on physician/advanced practitioner order or complex needs related to functional status, cognitive ability, or social support system  9/25/2021 1120 by Niraj Kendall RN  Outcome: Adequate for Discharge  9/25/2021 1024 by Niraj Kendall RN  Outcome: Progressing     Problem: Knowledge Deficit  Goal: Patient/family/caregiver demonstrates understanding of disease process, treatment plan, medications, and discharge instructions  Description: Complete learning assessment and assess knowledge base    Interventions:  - Provide teaching at level of understanding  - Provide teaching via preferred learning methods  9/25/2021 1120 by Niraj Kendall RN  Outcome: Adequate for Discharge  9/25/2021 1024 by Niraj Kendall RN  Outcome: Progressing

## 2021-09-25 NOTE — CASE MANAGEMENT
LOS: 1 day  Bundle: No  Unplanned Readmission Score: NA obs  30 Day Readmission: No     CM met with patient at bedside  Explained the role of CM  Obtained the following information from patient  Home: Lives apt- No steps  Lives With: Alone  ADL's: Independent  DME: None  Ambulation: Independent  Transportation: Drives  Pharmacy:[id number] [place] [phone]   PCP: Has an appointment with a new PCP in [facility] Hx: No  Rehab Hx: No  Mental Health Hx: Yes long time  Substance Abuse Hx: No  Employment:Retired  POA/LW/AD: No/No/No  Transport at D/C: [facility]    Patient keeps repeating he can not go home due to condition of [address]  Discussed alternative housing re staying with family or a homeless shelter  Declines both  Discussed transportation home family vs [facility]  Attempted to call sister Ubaldo Clayton x 2  per patient's request no answer left message for call back  Explained that patient is here as OBS and that he is medically cleared for discharge  Discussed that apartment living situation is not a medical reason to stay in hospital   Patient signed waiver for [address] [place] [address] ride [place]  CM reviewed d/c planning process including the following: identifying help at home, patient preferences for d/c planning needs,  availability of treatment team to discuss questions or concerns patient and/or family may have regarding understanding medications and recognizing signs and symptoms once discharged

## 2021-09-25 NOTE — DISCHARGE SUMMARY
[place]  Discharge- Shaune Smoke 1960, 57 y o  male MRN: [id number]  Unit/Bed#: -01 Encounter: [id number]  Primary Care Provider: No primary care provider on file  Date and time admitted to hospital: 9/24/2021  6:56 PM    * Acute bronchitis  Assessment & Plan  · Reports worsening of chronic cough x1 week with acute worsening x3 days   · Endorses nocturnal coughing fits and experiencing dyspnea s/p coughing fit while walking to his car from [facility] at 1800   · Reports feeling improved s/p albuterol inhaler x1 and solu-medrol 125mg in the ED but still has bibasilar expiratory wheezing - suspect inadequate inhaler usage   · Started on prednisone 40mg daily, xopenex neb TID, azithromycin PO x3 days (due to concern for underlying chronic airway disease), and Tessalon Perles   · Plan to discharge today - on medrol dose lyndon, oral azithromycin x 2 days (complete a 3 day course) and tessalon perles    · To follow up with his PCP    Varicose veins of both lower extremities  Assessment & Plan  Chronic bilateral varicose veins  Not inflamed, tender neither does it appear infected  No intervention warranted inpatient  Patient would like to follow up with surgery outpatient - ambulatory referral to surgery provided    Leukocytosis  Assessment & Plan  · WBC at 12 30K on admission secondary to acute bronchitis   · On azithromycin  · Leucocytosis resolved, wbc now 8 37  · Trend CBC    Hypokalemia  Assessment & Plan  · K at 3 1   · Replete prn  · K - 4 1 today    Schizophrenia ([facility] [address] )  Assessment & Plan  · Sister called ED to inform staff that patient is schizophrenic and noncompliant with his medications, however she does not know his medications  · Pt denies any medication intake on admission   · Patient AO x 3 this am, does not appear psychotic, seems misguided in thinking all his chronic issues (Left lateral back scab vs mole, varicose veins and h/o hoarding) would be solved inpatient, but no overt psychotic issues identified    Suspected reactive airway disease  Assessment & Plan  · Hx of second hand smoke exposure as a child   · Reports chronic cough of white/yellow phlegm   · Expiratory wheezing bibasilar lung fields   · Needs outpatient PFTs   · Will start on atrovent and xopenex TID       Discharging Physician / Practitioner: Andres Granados MD  PCP: No primary care provider on file  Admission Date:   Admission Orders (From admission, onward)     Ordered        09/24/21 2124  Place in Observation  Once                   Discharge Date: 09/25/21    Medical Problems           Consultations During Hospital Stay:  ·     Procedures Performed:   ·     Significant Findings / Test Results:   · Chest xray - no acute cardiopulmonary disease    Incidental Findings:   ·      Test Results Pending at Discharge (will require follow up):   ·      Outpatient Tests Requested:  ·     Complications:      Reason for Admission: ' I couldn't breath'    Hospital Course:     Mary Smith is a 57 y o  male patient with PMH of schizophrenia who originally presented to the hospital on 9/24/2021 due to the sensation of not being able to breathe  On presentation, he was hemodynamically stable, but was wheezing, thus he was started on antibiotics, breathing treatments and steroids and his symptoms improved  CXR was unremarkable but he was managed for acute bronchitis  Patient is being discharged to follow up with his PCP and establish care with pulmonology for outpatient PFTs  Please see above list of diagnoses and related plan for additional information       Condition at Discharge: stable     Discharge Day Visit / Exam:     Subjective:  No overnight events, feeling much better  Vitals: Blood Pressure: 147/83 (09/25/21 1039)  Pulse: 79 (09/24/21 2244)  Temperature: 98 2 °F (36 8 °C) (09/25/21 1039)  Temp Source: Temporal (09/24/21 1858)  Respirations: 20 (09/24/21 2244)  Height: 5' 6" (167 6 cm) (09/24/21 2244)  Weight - Scale: 90 7 kg (200 lb) (09/24/21 2244)  SpO2: 93 % (09/25/21 0930)  Exam:   Physical Exam  Vitals and nursing note reviewed  Constitutional:       General: He is not in acute distress  Appearance: Normal appearance  He is not ill-appearing, toxic-appearing or diaphoretic  Cardiovascular:      Rate and Rhythm: Normal rate and regular rhythm  Pulses: Normal pulses  Heart sounds: No murmur heard  No gallop  Pulmonary:      Effort: Pulmonary effort is normal  No respiratory distress  Breath sounds: Normal breath sounds  No stridor  No wheezing, rhonchi or rales  Chest:      Chest wall: No tenderness  Abdominal:      General: Bowel sounds are normal  There is no distension  Palpations: Abdomen is soft  Tenderness: There is no abdominal tenderness  There is no right CVA tenderness, left CVA tenderness or guarding  Musculoskeletal:         General: No swelling, tenderness, deformity or signs of injury  Normal range of motion  Right lower leg: No edema  Left lower leg: No edema  Comments: Varicose veins   Skin:     General: Skin is warm and dry  Capillary Refill: Capillary refill takes less than 2 seconds  Coloration: Skin is not jaundiced or pale  Findings: Rash (small circular maculopapular hyperpigmented rash) present  No bruising, erythema or lesion  Neurological:      General: No focal deficit present  Mental Status: He is alert and oriented to person, place, and time  Mental status is at baseline  Cranial Nerves: No cranial nerve deficit  Psychiatric:         Mood and Affect: Mood normal          Discussion with Family: Patient opted to update his sister himself    Discharge instructions/Information to patient and family:   See after visit summary for information provided to patient and family        Provisions for Follow-Up Care:  See after visit summary for information related to follow-up care and any pertinent home health orders  Disposition:     Home    For Discharges to [facility] SNF:   · Not Applicable to this Patient - Not Applicable to this Patient    Planned Readmission: no     Discharge Statement:  I spent 45 minutes discharging the patient  This time was spent on the day of discharge  I had direct contact with the patient on the day of discharge  Greater than 50% of the total time was spent examining patient, answering all patient questions, arranging and discussing plan of care with patient as well as directly providing post-discharge instructions  Additional time then spent on discharge activities  Discharge Medications:  See after visit summary for reconciled discharge medications provided to patient and family        ** Please Note: This note has been constructed using a voice recognition system **

## 2021-09-25 NOTE — DISCHARGE INSTRUCTIONS
Acute Bronchitis   WHAT YOU SHOULD KNOW:   Acute bronchitis is swelling and irritation in the air passages of your lungs  This irritation may cause you to cough or have other breathing problems  Acute bronchitis often starts because of another viral illness, such as a cold or the flu  The illness spreads from your nose and throat to your windpipe and airways  Bronchitis is often called a chest cold  Acute bronchitis lasts about 2 weeks and is usually not a serious illness  AFTER YOU LEAVE:   Medicines:   · Ibuprofen or acetaminophen:  These medicines help lower a fever  They are available without a doctor's order  Ask your healthcare provider which medicine is right for you  Ask how much to take and how often to take it  Follow directions  These medicines can cause stomach bleeding if not taken correctly  Ibuprofen can cause kidney damage  Do not take ibuprofen if you have kidney disease, an ulcer, or allergies to aspirin  Acetaminophen can cause liver damage  Do not drink alcohol if you take acetaminophen  · Cough medicine: This medicine helps loosen mucus in your lungs and make it easier to cough up  This can help you breathe easier  · Inhalers: You may need one or more inhalers to help you breathe easier and cough less  An inhaler gives your medicine in a mist form so that you can breathe it into your lungs  Ask your healthcare provider to show you how to use your inhaler correctly  · Steroid medicine:  Steroid medicine helps open your air passages so you can breathe easier  · Take your medicine as directed  Call your healthcare provider if you think your medicine is not helping or if you have side effects  Tell him if you are allergic to any medicine  Keep a list of the medicines, vitamins, and herbs you take  Include the amounts, and when and why you take them  Bring the list or the pill bottles to follow-up visits  Carry your medicine list with you in case of an emergency    How to use an inhaler:   · Shake the inhaler well to make sure you get the correct amount of medicine per puff  Remove the cover from your inhaler's mouthpiece  If you are using a spacer, connect your inhaler to the flat end of the spacer  · Exhale as much air from your lungs as you can  Put the mouthpiece in your mouth past your front teeth and rest it on the top of your tongue  Do not block the mouthpiece opening with your tongue  · Breathe in through your mouth at a slow and steady rate  As you do this, press the inhaler to release the puff of medicine  Finish breathing in slowly and deeply as you inhale the medicine  When your lungs are full, hold your breath for 10 seconds  Then breathe out slowly through puckered lips or through your nose  · If you need to take more puffs, wait at least 1 minute between each puff  · Rinse your mouth with water after you use the inhaler  This may keep you from getting a mouth infection or irritation  · Follow the instructions that come with your inhaler to clean it  You should clean your inhaler at least once a week  Ways to care for yourself:   · Avoid alcohol:  Alcohol dulls your urge to cough and sneeze  When you have bronchitis, you need to be able to cough and sneeze to clear your air passages  Alcohol also causes your body to lose fluid  This can make the mucus in your lungs thicker and harder to cough up  · Avoid irritants in the air:  Do not smoke or allow others to smoke around you  Avoid chemicals, fumes, and dust  Wear a face mask if you must work around dust or fumes  Stay inside on days when air pollution levels are high  If you have allergies, stay inside when pollen counts are high  Avoid aerosol products  This includes spray-on deodorant, bug spray, and hair spray  · Drink more liquids:  Most people should drink at least 8 eight-ounce cups of water a day  You may need to drink more liquids when you have acute bronchitis   Liquids help keep your air passages moist and help you cough up mucus  · Get more rest:  You may feel like resting more  Slowly start to do more each day  Rest when you feel it is needed  · Eat healthy foods:  Eat a variety healthy foods every day  Your diet should include fruits, vegetables, breads, and protein (such as chicken, fish, and beans)  Dairy products (such as milk, cheese, and ice cream) can sometimes increase the amount of mucus your body makes  Ask if you should decrease your intake of dairy products  · Use a humidifier:  Use a cool mist humidifier to increase air moisture in your home  This may make it easier for you to breathe and help decrease your cough  Decrease your risk of acute bronchitis:   · Get the vaccinations you need:  Ask your healthcare provider if you should get vaccinated against the flu or pneumonia  · Avoid things that may irritate your lungs:  Stay inside or cover your mouth and nose with a scarf when you are outside during cold weather  You should also stay inside on days when air pollution levels are high  If you have allergies, stay inside when pollen counts are high  Avoid using aerosol products in your home  This includes spray-on deodorant, bug spray, and hair spray  · Avoid the spread of germs:        [facility] your hands often with soap and water  Carry germ-killing gel with you  You can use the gel to clean your hands when there is no soap and water available  ¨ Do not touch your eyes, nose, or mouth unless you have washed your hands first     ¨ Always cover your mouth when you cough  Cough into a tissue or your shirtsleeve so you do not spread germs from your hands  ¨ Try to avoid people who have a cold or the flu  If you are sick, stay away from others as much as possible  Follow up with your healthcare provider as directed:  Write down questions you have so you will remember to ask them during your follow-up visits    Contact your healthcare provider if:   · You have a fever     · Your skin becomes itchy or you have a rash after you take your medicine  · Your breathing problems do not go away or get worse  · Your cough does not get better with treatment  · You cough up blood  · You have questions or concerns about your condition or care  Seek care immediately or call 911 if:   · You faint  · Your lips or fingernails turn blue  · You feel like you are not getting enough air when you breathe  · You have swelling of your lips, tongue, or throat that makes it hard to breathe or swallow  © 2014 [address] is for End User's use only and may not be sold, redistributed or otherwise used for commercial purposes  All illustrations and images included in CareNotes® are the copyrighted property of [employer]  or Joshua Mariano  The above information is an  only  It is not intended as medical advice for individual conditions or treatments  Talk to your doctor, nurse or pharmacist before following any medical regimen to see if it is safe and effective for you

## 2022-04-26 ENCOUNTER — APPOINTMENT (EMERGENCY)
Dept: RADIOLOGY | Facility: HOSPITAL | Age: 62
End: 2022-04-26
Payer: COMMERCIAL

## 2022-04-26 ENCOUNTER — HOSPITAL ENCOUNTER (EMERGENCY)
Facility: HOSPITAL | Age: 62
Discharge: HOME | End: 2022-04-26
Attending: EMERGENCY MEDICINE
Payer: COMMERCIAL

## 2022-04-26 VITALS
RESPIRATION RATE: 27 BRPM | TEMPERATURE: 97.6 F | HEIGHT: 66 IN | SYSTOLIC BLOOD PRESSURE: 144 MMHG | OXYGEN SATURATION: 97 % | BODY MASS INDEX: 32.14 KG/M2 | DIASTOLIC BLOOD PRESSURE: 92 MMHG | WEIGHT: 199.96 LBS | HEART RATE: 81 BPM

## 2022-04-26 DIAGNOSIS — J20.9 ACUTE BRONCHITIS WITH BRONCHOSPASM: Primary | ICD-10-CM

## 2022-04-26 LAB
2HR DELTA HS TROPONIN: -1 NG/L
ALBUMIN SERPL BCP-MCNC: 4.3 G/DL (ref 3.5–5)
ALP SERPL-CCNC: 102 U/L (ref 46–116)
ALT SERPL W P-5'-P-CCNC: 30 U/L (ref 12–78)
AMPHETAMINES SERPL QL SCN: NEGATIVE
ANION GAP SERPL CALCULATED.3IONS-SCNC: 12 MMOL/L (ref 4–13)
APTT PPP: 26 SECONDS (ref 23–37)
AST SERPL W P-5'-P-CCNC: 25 U/L (ref 5–45)
BARBITURATES UR QL: NEGATIVE
BASOPHILS # BLD AUTO: 0.12 THOUSANDS/ΜL (ref 0–0.1)
BASOPHILS NFR BLD AUTO: 1 % (ref 0–1)
BENZODIAZ UR QL: NEGATIVE
BILIRUB SERPL-MCNC: 0.5 MG/DL (ref 0.2–1)
BILIRUB UR QL STRIP: NEGATIVE
BUN SERPL-MCNC: 16 MG/DL (ref 5–25)
CALCIUM SERPL-MCNC: 10.1 MG/DL (ref 8.3–10.1)
CARDIAC TROPONIN I PNL SERPL HS: 4 NG/L
CARDIAC TROPONIN I PNL SERPL HS: 5 NG/L
CHLORIDE SERPL-SCNC: 96 MMOL/L (ref 100–108)
CLARITY UR: CLEAR
CO2 SERPL-SCNC: 29 MMOL/L (ref 21–32)
COCAINE UR QL: NEGATIVE
COLOR UR: YELLOW
CREAT SERPL-MCNC: 1.25 MG/DL (ref 0.6–1.3)
EOSINOPHIL # BLD AUTO: 0.79 THOUSAND/ΜL (ref 0–0.61)
EOSINOPHIL NFR BLD AUTO: 7 % (ref 0–6)
ERYTHROCYTE [DISTWIDTH] IN BLOOD BY AUTOMATED COUNT: 12.8 % (ref 11.6–15.1)
ETHANOL SERPL-MCNC: <3 MG/DL (ref 0–3)
FLUAV RNA RESP QL NAA+PROBE: NEGATIVE
FLUBV RNA RESP QL NAA+PROBE: NEGATIVE
GFR SERPL CREATININE-BSD FRML MDRD: 61 ML/MIN/1.73SQ M
GLUCOSE SERPL-MCNC: 160 MG/DL (ref 65–140)
GLUCOSE UR STRIP-MCNC: NEGATIVE MG/DL
HCT VFR BLD AUTO: 47.9 % (ref 36.5–49.3)
HGB BLD-MCNC: 16.6 G/DL (ref 12–17)
HGB UR QL STRIP.AUTO: NEGATIVE
IMM GRANULOCYTES # BLD AUTO: 0.04 THOUSAND/UL (ref 0–0.2)
IMM GRANULOCYTES NFR BLD AUTO: 0 % (ref 0–2)
INR PPP: 1.01 (ref 0.84–1.19)
KETONES UR STRIP-MCNC: ABNORMAL MG/DL
LACTATE SERPL-SCNC: 2 MMOL/L (ref 0.5–2)
LACTATE SERPL-SCNC: 2.9 MMOL/L (ref 0.5–2)
LEUKOCYTE ESTERASE UR QL STRIP: NEGATIVE
LYMPHOCYTES # BLD AUTO: 2.08 THOUSANDS/ΜL (ref 0.6–4.47)
LYMPHOCYTES NFR BLD AUTO: 17 % (ref 14–44)
MCH RBC QN AUTO: 31.5 PG (ref 26.8–34.3)
MCHC RBC AUTO-ENTMCNC: 34.7 G/DL (ref 31.4–37.4)
MCV RBC AUTO: 91 FL (ref 82–98)
METHADONE UR QL: NEGATIVE
MONOCYTES # BLD AUTO: 1.21 THOUSAND/ΜL (ref 0.17–1.22)
MONOCYTES NFR BLD AUTO: 10 % (ref 4–12)
NEUTROPHILS # BLD AUTO: 7.69 THOUSANDS/ΜL (ref 1.85–7.62)
NEUTS SEG NFR BLD AUTO: 65 % (ref 43–75)
NITRITE UR QL STRIP: NEGATIVE
NRBC BLD AUTO-RTO: 0 /100 WBCS
NT-PROBNP SERPL-MCNC: 62 PG/ML
OPIATES UR QL SCN: NEGATIVE
OXYCODONE+OXYMORPHONE UR QL SCN: NEGATIVE
PCP UR QL: NEGATIVE
PH UR STRIP.AUTO: 6 [PH]
PLATELET # BLD AUTO: 366 THOUSANDS/UL (ref 149–390)
PMV BLD AUTO: 10.7 FL (ref 8.9–12.7)
POTASSIUM SERPL-SCNC: 3.5 MMOL/L (ref 3.5–5.3)
PROCALCITONIN SERPL-MCNC: 0.07 NG/ML
PROT SERPL-MCNC: 8.3 G/DL (ref 6.4–8.2)
PROT UR STRIP-MCNC: NEGATIVE MG/DL
PROTHROMBIN TIME: 13.2 SECONDS (ref 11.6–14.5)
RBC # BLD AUTO: 5.27 MILLION/UL (ref 3.88–5.62)
RSV RNA RESP QL NAA+PROBE: NEGATIVE
SARS-COV-2 RNA RESP QL NAA+PROBE: NEGATIVE
SODIUM SERPL-SCNC: 137 MMOL/L (ref 136–145)
SP GR UR STRIP.AUTO: 1.01 (ref 1–1.03)
THC UR QL: NEGATIVE
UROBILINOGEN UR QL STRIP.AUTO: 0.2 E.U./DL
WBC # BLD AUTO: 11.93 THOUSAND/UL (ref 4.31–10.16)

## 2022-04-26 PROCEDURE — 99285 EMERGENCY DEPT VISIT HI MDM: CPT

## 2022-04-26 PROCEDURE — 85025 COMPLETE CBC W/AUTO DIFF WBC: CPT

## 2022-04-26 PROCEDURE — 83880 ASSAY OF NATRIURETIC PEPTIDE: CPT

## 2022-04-26 PROCEDURE — 83605 ASSAY OF LACTIC ACID: CPT | Performed by: EMERGENCY MEDICINE

## 2022-04-26 PROCEDURE — 84484 ASSAY OF TROPONIN QUANT: CPT

## 2022-04-26 PROCEDURE — 85610 PROTHROMBIN TIME: CPT | Performed by: EMERGENCY MEDICINE

## 2022-04-26 PROCEDURE — 85730 THROMBOPLASTIN TIME PARTIAL: CPT | Performed by: EMERGENCY MEDICINE

## 2022-04-26 PROCEDURE — 71045 X-RAY EXAM CHEST 1 VIEW: CPT

## 2022-04-26 PROCEDURE — 0241U HB NFCT DS VIR RESP RNA 4 TRGT: CPT | Performed by: EMERGENCY MEDICINE

## 2022-04-26 PROCEDURE — 80307 DRUG TEST PRSMV CHEM ANLYZR: CPT | Performed by: EMERGENCY MEDICINE

## 2022-04-26 PROCEDURE — 84145 PROCALCITONIN (PCT): CPT | Performed by: EMERGENCY MEDICINE

## 2022-04-26 PROCEDURE — 87040 BLOOD CULTURE FOR BACTERIA: CPT | Performed by: EMERGENCY MEDICINE

## 2022-04-26 PROCEDURE — 81003 URINALYSIS AUTO W/O SCOPE: CPT | Performed by: EMERGENCY MEDICINE

## 2022-04-26 PROCEDURE — 99284 EMERGENCY DEPT VISIT MOD MDM: CPT | Performed by: EMERGENCY MEDICINE

## 2022-04-26 PROCEDURE — 94640 AIRWAY INHALATION TREATMENT: CPT

## 2022-04-26 PROCEDURE — 93005 ELECTROCARDIOGRAM TRACING: CPT

## 2022-04-26 PROCEDURE — 36415 COLL VENOUS BLD VENIPUNCTURE: CPT

## 2022-04-26 PROCEDURE — 96361 HYDRATE IV INFUSION ADD-ON: CPT

## 2022-04-26 PROCEDURE — 80053 COMPREHEN METABOLIC PANEL: CPT

## 2022-04-26 PROCEDURE — 82077 ASSAY SPEC XCP UR&BREATH IA: CPT | Performed by: EMERGENCY MEDICINE

## 2022-04-26 PROCEDURE — 96374 THER/PROPH/DIAG INJ IV PUSH: CPT

## 2022-04-26 RX ORDER — METHYLPREDNISOLONE SODIUM SUCCINATE 125 MG/2ML
80 INJECTION, POWDER, LYOPHILIZED, FOR SOLUTION INTRAMUSCULAR; INTRAVENOUS ONCE
Status: COMPLETED | OUTPATIENT
Start: 2022-04-26 | End: 2022-04-26

## 2022-04-26 RX ORDER — SODIUM CHLORIDE 9 MG/ML
3 INJECTION INTRAVENOUS
Status: DISCONTINUED | OUTPATIENT
Start: 2022-04-26 | End: 2022-04-27 | Stop reason: HOSPADM

## 2022-04-26 RX ORDER — IPRATROPIUM BROMIDE AND ALBUTEROL SULFATE 2.5; .5 MG/3ML; MG/3ML
3 SOLUTION RESPIRATORY (INHALATION) ONCE
Status: COMPLETED | OUTPATIENT
Start: 2022-04-26 | End: 2022-04-26

## 2022-04-26 RX ORDER — PREDNISONE 20 MG/1
20 TABLET ORAL 2 TIMES DAILY WITH MEALS
Qty: 14 TABLET | Refills: 0 | Status: SHIPPED | OUTPATIENT
Start: 2022-04-26 | End: 2022-05-03

## 2022-04-26 RX ORDER — BENZONATATE 100 MG/1
100 CAPSULE ORAL ONCE
Status: COMPLETED | OUTPATIENT
Start: 2022-04-26 | End: 2022-04-26

## 2022-04-26 RX ORDER — ALBUTEROL SULFATE 90 UG/1
2 AEROSOL, METERED RESPIRATORY (INHALATION) EVERY 4 HOURS PRN
Qty: 18 G | Refills: 0 | OUTPATIENT
Start: 2022-04-26 | End: 2022-07-16

## 2022-04-26 RX ORDER — ALBUTEROL SULFATE 90 UG/1
5 AEROSOL, METERED RESPIRATORY (INHALATION) ONCE
Status: COMPLETED | OUTPATIENT
Start: 2022-04-26 | End: 2022-04-26

## 2022-04-26 RX ADMIN — IPRATROPIUM BROMIDE AND ALBUTEROL SULFATE 3 ML: 2.5; .5 SOLUTION RESPIRATORY (INHALATION) at 21:14

## 2022-04-26 RX ADMIN — BENZONATATE 100 MG: 100 CAPSULE ORAL at 18:58

## 2022-04-26 RX ADMIN — METHYLPREDNISOLONE SODIUM SUCCINATE 80 MG: 125 INJECTION, POWDER, FOR SOLUTION INTRAMUSCULAR; INTRAVENOUS at 21:14

## 2022-04-26 RX ADMIN — ALBUTEROL SULFATE 5 PUFF: 90 AEROSOL, METERED RESPIRATORY (INHALATION) at 18:58

## 2022-04-26 RX ADMIN — SODIUM CHLORIDE 1000 ML: 0.9 INJECTION, SOLUTION INTRAVENOUS at 19:02
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
## 2022-04-27 LAB
ATRIAL RATE: 85 BPM
P AXIS: 89 DEGREES
PR INTERVAL: 150 MS
QRS AXIS: 66 DEGREES
QRSD INTERVAL: 78 MS
QT INTERVAL: 358 MS
QTC INTERVAL: 426 MS
T WAVE AXIS: 77 DEGREES
VENTRICULAR RATE: 85 BPM

## 2022-04-27 PROCEDURE — 93010 ELECTROCARDIOGRAM REPORT: CPT | Performed by: INTERNAL MEDICINE

## 2022-04-27 NOTE — DISCHARGE INSTRUCTIONS
Keep well hydrated  Take medications as prescribed  Call the [facility] number below to get an appointment with a local PCP as soon as possible for your pulmonary problems  Call the [address] [place] [address] on Aging for assistance in finding a new place to stay

## 2022-04-27 NOTE — ED NOTES
Pt ambulated around hallway without any oxygen  Oxygenation trending around 91-94%  No reports of sob or difficulty breathing         600 Donta Moreno RN  04/26/22 4196

## 2022-05-02 LAB — BACTERIA BLD CULT: NORMAL

## 2022-07-16 ENCOUNTER — HOSPITAL ENCOUNTER (EMERGENCY)
Facility: HOSPITAL | Age: 62
Discharge: HOME | End: 2022-07-16
Attending: EMERGENCY MEDICINE | Admitting: EMERGENCY MEDICINE

## 2022-07-16 ENCOUNTER — APPOINTMENT (EMERGENCY)
Dept: RADIOLOGY | Facility: HOSPITAL | Age: 62
End: 2022-07-16

## 2022-07-16 VITALS
OXYGEN SATURATION: 98 % | HEART RATE: 80 BPM | HEIGHT: 67 IN | WEIGHT: 210 LBS | RESPIRATION RATE: 17 BRPM | DIASTOLIC BLOOD PRESSURE: 80 MMHG | SYSTOLIC BLOOD PRESSURE: 183 MMHG | BODY MASS INDEX: 32.96 KG/M2 | TEMPERATURE: 97.9 F

## 2022-07-16 DIAGNOSIS — J20.9 ACUTE BRONCHITIS WITH WHEEZING: ICD-10-CM

## 2022-07-16 DIAGNOSIS — R06.00 DYSPNEA: Primary | ICD-10-CM

## 2022-07-16 LAB
ATRIAL RATE: 79 BPM
FLUAV RNA RESP QL NAA+PROBE: NEGATIVE
FLUBV RNA RESP QL NAA+PROBE: NEGATIVE
P AXIS: 73 DEGREES
PR INTERVAL: 170 MS
QRS AXIS: 17 DEGREES
QRSD INTERVAL: 70 MS
QT INTERVAL: 408 MS
QTC INTERVAL: 467 MS
RSV RNA RESP QL NAA+PROBE: NEGATIVE
SARS-COV-2 RNA RESP QL NAA+PROBE: NEGATIVE
T WAVE AXIS: 78 DEGREES
VENTRICULAR RATE: 79 BPM

## 2022-07-16 PROCEDURE — 99285 EMERGENCY DEPT VISIT HI MDM: CPT

## 2022-07-16 PROCEDURE — 99285 EMERGENCY DEPT VISIT HI MDM: CPT | Performed by: EMERGENCY MEDICINE

## 2022-07-16 PROCEDURE — 93010 ELECTROCARDIOGRAM REPORT: CPT | Performed by: INTERNAL MEDICINE

## 2022-07-16 PROCEDURE — 93005 ELECTROCARDIOGRAM TRACING: CPT

## 2022-07-16 PROCEDURE — 94640 AIRWAY INHALATION TREATMENT: CPT

## 2022-07-16 PROCEDURE — 71045 X-RAY EXAM CHEST 1 VIEW: CPT

## 2022-07-16 PROCEDURE — 0241U HB NFCT DS VIR RESP RNA 4 TRGT: CPT | Performed by: EMERGENCY MEDICINE

## 2022-07-16 RX ORDER — IPRATROPIUM BROMIDE AND ALBUTEROL SULFATE 2.5; .5 MG/3ML; MG/3ML
3 SOLUTION RESPIRATORY (INHALATION) ONCE
Status: COMPLETED | OUTPATIENT
Start: 2022-07-16 | End: 2022-07-16

## 2022-07-16 RX ORDER — METHYLPREDNISOLONE 4 MG/1
TABLET ORAL
Qty: 21 TABLET | Refills: 0 | Status: SHIPPED | OUTPATIENT
Start: 2022-07-16

## 2022-07-16 RX ORDER — ALBUTEROL SULFATE 90 UG/1
1-2 AEROSOL, METERED RESPIRATORY (INHALATION) EVERY 6 HOURS PRN
Qty: 18 G | Refills: 0 | Status: SHIPPED | OUTPATIENT
Start: 2022-07-16

## 2022-07-16 RX ORDER — PREDNISONE 20 MG/1
20 TABLET ORAL ONCE
Status: COMPLETED | OUTPATIENT
Start: 2022-07-16 | End: 2022-07-16

## 2022-07-16 RX ADMIN — IPRATROPIUM BROMIDE AND ALBUTEROL SULFATE 3 ML: 2.5; .5 SOLUTION RESPIRATORY (INHALATION) at 06:32

## 2022-07-16 RX ADMIN — PREDNISONE 20 MG: 20 TABLET ORAL at 06:32

## 2022-07-16 NOTE — ED PROVIDER NOTES
History  Chief Complaint   Patient presents with    Shortness of Breath     Pt states that he has been feeling SOB for over a month and was taking a few meds  56 yo M with PMH of paranoid schizophrenia, reactive airway disease presents to ED with a month of gradually worsening dyspnea  Has had similar in past  Does not smoke, no drugs  No cough or fevers  No CP or GI sx  No leg pain/swelling or travel  No h/o PE/DVT  He had an inhaler in the past, but it ran out, he hasn't tried anything else  Goes to a clinic, but has not seen the clinic for this issue recently  Was here in April for similar  History provided by:  Patient and medical records   used: No    Shortness of Breath  Severity:  Moderate  Onset quality:  Gradual  Duration:  1 month  Timing:  Intermittent  Progression:  Worsening  Chronicity:  Recurrent  Associated symptoms: no abdominal pain, no chest pain, no cough, no diaphoresis, no ear pain, no fever, no headaches, no neck pain, no rash, no sore throat and no vomiting        Prior to Admission Medications   Prescriptions Last Dose Informant Patient Reported? Taking? albuterol (Proventil HFA) 90 mcg/act inhaler   No No   Sig: Inhale 2 puffs every 4 (four) hours as needed for wheezing   benzonatate (TESSALON PERLES) 100 mg capsule   No No   Sig: Take 1 capsule (100 mg total) by mouth 3 (three) times a day as needed for cough   methylPREDNISolone 4 MG tablet therapy pack   No No   Sig: Use as directed on package      Facility-Administered Medications: None       Past Medical History:   Diagnosis Date    Deafness in right ear        Past Surgical History:   Procedure Laterality Date    SPLENECTOMY, TOTAL         History reviewed  No pertinent family history  I have reviewed and agree with the history as documented      E-Cigarette/Vaping    E-Cigarette Use Never User      E-Cigarette/Vaping Substances    Nicotine No     THC No     CBD No     Flavoring No     Other No  Unknown No      Social History     Tobacco Use    Smoking status: Former Smoker     Quit date: 1987     Years since quittin 8    Smokeless tobacco: Never Used   Vaping Use    Vaping Use: Never used   Substance Use Topics    Alcohol use: Not Currently    Drug use: Not Currently       Review of Systems   Constitutional: Negative for chills, diaphoresis, fatigue, fever and unexpected weight change  HENT: Negative for congestion, ear pain, rhinorrhea, sore throat, trouble swallowing and voice change  Eyes: Negative for pain and visual disturbance  Respiratory: Positive for shortness of breath  Negative for cough and chest tightness  Cardiovascular: Negative for chest pain, palpitations and leg swelling  Gastrointestinal: Negative for abdominal pain, blood in stool, constipation, diarrhea, nausea and vomiting  Genitourinary: Negative for difficulty urinating and hematuria  Musculoskeletal: Negative for arthralgias, back pain and neck pain  Skin: Negative for rash  Neurological: Negative for dizziness, syncope, light-headedness and headaches  Psychiatric/Behavioral: Negative for confusion and suicidal ideas  The patient is not nervous/anxious  Physical Exam  Physical Exam  Constitutional:       General: He is not in acute distress  Appearance: He is well-developed  He is not diaphoretic  HENT:      Head: Normocephalic and atraumatic  Right Ear: External ear normal       Left Ear: External ear normal       Nose: Nose normal    Eyes:      General: No scleral icterus  Right eye: No discharge  Left eye: No discharge  Extraocular Movements: Extraocular movements intact  Conjunctiva/sclera: Conjunctivae normal       Pupils: Pupils are equal, round, and reactive to light  Neck:      Vascular: No JVD  Trachea: No tracheal deviation  Cardiovascular:      Rate and Rhythm: Normal rate and regular rhythm        Heart sounds: Normal heart sounds  No murmur heard  No friction rub  No gallop  Pulmonary:      Effort: Pulmonary effort is normal  No respiratory distress  Breath sounds: No stridor  Wheezing present  No rales  Chest:      Chest wall: No tenderness  Abdominal:      General: Bowel sounds are normal  There is no distension  Palpations: Abdomen is soft  Tenderness: There is no abdominal tenderness  There is no guarding or rebound  Musculoskeletal:         General: No tenderness or deformity  Normal range of motion  Cervical back: Normal range of motion and neck supple  Right lower leg: No tenderness  No edema  Left lower leg: No tenderness  No edema  Lymphadenopathy:      Cervical: No cervical adenopathy  Skin:     General: Skin is warm and dry  Findings: No rash  Neurological:      General: No focal deficit present  Mental Status: He is alert and oriented to person, place, and time  Cranial Nerves: No cranial nerve deficit  Sensory: No sensory deficit        Coordination: Coordination normal    Psychiatric:         Behavior: Behavior normal          Vital Signs  ED Triage Vitals   Temperature Pulse Respirations Blood Pressure SpO2   07/16/22 0617 07/16/22 0619 07/16/22 0619 07/16/22 0619 07/16/22 0619   97 9 °F (36 6 °C) 80 (!) 25 (!) 217/105 93 %      Temp Source Heart Rate Source Patient Position - Orthostatic VS BP Location FiO2 (%)   07/16/22 0617 07/16/22 0619 -- -- --   Temporal Monitor         Pain Score       --                  Vitals:    07/16/22 0619   BP: (!) 217/105   Pulse: 80         Visual Acuity      ED Medications  Medications   ipratropium-albuterol (DUO-NEB) 0 5-2 5 mg/3 mL inhalation solution 3 mL (3 mL Nebulization Given 7/16/22 4536)   predniSONE tablet 20 mg (20 mg Oral Given 7/16/22 8142)       Diagnostic Studies  Results Reviewed     Procedure Component Value Units Date/Time    FLU/RSV/COVID - if FLU/RSV clinically relevant [[id number]] Collected: 07/16/22 0622    Lab Status: In process Specimen: Nares from Nasopharyngeal Swab Updated: 07/16/22 0625                 XR chest 1 view portable    (Results Pending)              Procedures  ECG 12 Lead Documentation Only    Date/Time: 7/16/2022 6:30 AM  Performed by: Bri Anderson MD  Authorized by: Bri Anderson MD     Indications / Diagnosis:  Sob  ECG reviewed by me, the ED Provider: yes    Patient location:  ED  Previous ECG:     Previous ECG:  Unavailable    Comparison to cardiac monitor: Yes    Interpretation:     Interpretation: non-specific    Rate:     ECG rate:  79    ECG rate assessment: normal    Rhythm:     Rhythm: sinus rhythm    Ectopy:     Ectopy: none    QRS:     QRS axis:  Normal    QRS intervals:  Normal  Conduction:     Conduction: normal    ST segments:     ST segments:  Normal  T waves:     T waves: normal               ED Course  ED Course as of 07/16/22 0636   Sat Jul 16, 2022   0633 Pt in no distress, speaking in full sentences w/out issue, ambulated to room in no distress  Wheezy on exam  Will give neb, steroids, check xray, viral swab  Stressed importance of PCP f/u  RTED instructions given for worsening sx  SBIRT 22yo+    Flowsheet Row Most Recent Value   SBIRT (25 yo +)    In order to provide better care to our patients, we are screening all of our patients for alcohol and drug use  Would it be okay to ask you these screening questions? Yes Filed at: 07/16/2022 5526   Initial Alcohol Screen: US AUDIT-C     1  How often do you have a drink containing alcohol? 0 Filed at: 07/16/2022 0622   2  How many drinks containing alcohol do you have on a typical day you are drinking? 0 Filed at: 07/16/2022 0622   3a  Male UNDER 65: How often do you have five or more drinks on one occasion? 0 Filed at: 07/16/2022 0622   3b  FEMALE Any Age, or MALE 65+: How often do you have 4 or more drinks on one occassion?  0 Filed at: 07/16/2022 0622   Audit-C Score 0 Filed at: 07/16/2022 7363   ANJELICA: How many times in the past year have you    Used an illegal drug or used a prescription medication for non-medical reasons? Never Filed at: 07/16/2022 2639                    [facility]  Number of Diagnoses or Management Options  Acute bronchitis with wheezing: new and requires workup  Dyspnea: new and requires workup     Amount and/or Complexity of Data Reviewed  Clinical lab tests: ordered  Tests in the radiology section of CPT®: reviewed and ordered  Review and summarize past medical records: yes  Independent visualization of images, tracings, or specimens: yes    Risk of Complications, Morbidity, and/or Mortality  Presenting problems: moderate  Diagnostic procedures: low  Management options: low    Patient Progress  Patient progress: improved      Disposition  Final diagnoses:   Dyspnea   Acute bronchitis with wheezing     Time reflects when diagnosis was documented in both MDM as applicable and the Disposition within this note     Time User Action Codes Description Comment    7/16/2022  6:34 AM Patricia HALEY Add [R06 00] Dyspnea     7/16/2022  6:34 AM Charanijt Hernandez Add [J20 9] Acute bronchitis with wheezing       ED Disposition     ED Disposition   Discharge    Condition   Stable    Date/Time   Sat Jul 16, 2022  6:36 AM    Comment   Prem Human discharge to home/self care                 Follow-up Information     Follow up With Specialties Details Why Contact Info Additional [address] [facility], DO Internal Medicine Schedule an appointment as soon as possible for a visit   [address]        [address] Emergency Department Emergency Medicine  If symptoms worsen [address] [place], [phone]  [address] [facility] Emergency Department, [address] [place], [address]          Patient's Medications   Discharge Prescriptions ALBUTEROL (PROVENTIL HFA) 90 MCG/ACT INHALER    Inhale 1-2 puffs every 6 (six) hours as needed for wheezing or shortness of breath       Start Date: 7/16/2022 End Date: --       Order Dose: 1-2 puffs       Quantity: 18 g    Refills: 0    METHYLPREDNISOLONE 4 MG TABLET THERAPY PACK    Use as directed on package       Start Date: 7/16/2022 End Date: --       Order Dose: --       Quantity: 21 tablet    Refills: 0       No discharge procedures on file      PDMP Review     None          ED Provider  Electronically Signed by           Anh Webster MD  07/16/22 9450

## 2023-02-07 ENCOUNTER — APPOINTMENT (OUTPATIENT)
Dept: RADIOLOGY | Facility: HOSPITAL | Age: 63
End: 2023-02-07

## 2023-02-07 ENCOUNTER — HOSPITAL ENCOUNTER (EMERGENCY)
Facility: HOSPITAL | Age: 63
Discharge: HOME | End: 2023-02-08
Attending: EMERGENCY MEDICINE

## 2023-02-07 VITALS
DIASTOLIC BLOOD PRESSURE: 90 MMHG | OXYGEN SATURATION: 98 % | RESPIRATION RATE: 18 BRPM | TEMPERATURE: 97.8 F | BODY MASS INDEX: 33.74 KG/M2 | HEART RATE: 71 BPM | WEIGHT: 215 LBS | HEIGHT: 67 IN | SYSTOLIC BLOOD PRESSURE: 185 MMHG

## 2023-02-07 DIAGNOSIS — J20.9 ACUTE BRONCHITIS WITH WHEEZING: Primary | ICD-10-CM

## 2023-02-07 DIAGNOSIS — R03.0 ELEVATED BLOOD PRESSURE READING: ICD-10-CM

## 2023-02-07 RX ORDER — METHYLPREDNISOLONE 4 MG/1
TABLET ORAL
Qty: 21 TABLET | Refills: 0 | Status: SHIPPED | OUTPATIENT
Start: 2023-02-07

## 2023-02-08 NOTE — ED TRIAGE NOTES
Pt  Arrives to ED with complaints of cough x1 wk  Unsure of fevers  Pt  Also stating "I need my annual blood tests and my shoulder and elbow lumps looked at" pt   Breathing equal and unlabored, appears in NAD at this time

## 2023-02-08 NOTE — ED PROVIDER NOTES
History  Chief Complaint   Patient presents with   • Cough     57-year-old male presents for evaluation of cough for the past 3 weeks  Cough is nonproductive and associated with wheezing  Patient states he has an albuterol inhaler which she has been using sporadically with last used approximately 2 hours ago  No fevers or chills  Patient states he had a similar episode previously which he states resolved with albuterol, Medrol Dosepak, Tessalon Perles and azithromycin  Patient denies history of COPD; however, according to medical records, patient had a prior admission to [facility] for COPD exacerbation  Former smoker  Patient additionally states that he had been recommended to see a primary care doctor for evaluation of a lipoma in his right shoulder as well as to have annual labs performed  He also notes right elbow swelling after blunt injury to the elbow  Patient states that he does not currently have a primary care provider and is uninsured  Prior to Admission Medications   Prescriptions Last Dose Informant Patient Reported? Taking? albuterol (Proventil HFA) 90 mcg/act inhaler   No No   Sig: Inhale 1-2 puffs every 6 (six) hours as needed for wheezing or shortness of breath   benzonatate (TESSALON PERLES) 100 mg capsule   No No   Sig: Take 1 capsule (100 mg total) by mouth 3 (three) times a day as needed for cough   methylPREDNISolone 4 MG tablet therapy pack   No No   Sig: Use as directed on package   methylPREDNISolone 4 MG tablet therapy pack   No Yes   Sig: Use as directed on package      Facility-Administered Medications: None       Past Medical History:   Diagnosis Date   • Deafness in right ear        Past Surgical History:   Procedure Laterality Date   • SPLENECTOMY, TOTAL         History reviewed  No pertinent family history  I have reviewed and agree with the history as documented      E-Cigarette/Vaping   • E-Cigarette Use Never User      E-Cigarette/Vaping Substances   • Nicotine No    • THC No    • CBD No    • Flavoring No    • Other No    • Unknown No      Social History     Tobacco Use   • Smoking status: Former     Types: Cigarettes     Quit date: 1987     Years since quittin 4   • Smokeless tobacco: Never   Vaping Use   • Vaping Use: Never used   Substance Use Topics   • Alcohol use: Not Currently   • Drug use: Not Currently       Review of Systems    Physical Exam  Physical Exam  Vitals and nursing note reviewed  Constitutional:       General: He is not in acute distress  Appearance: He is well-developed  He is not toxic-appearing or diaphoretic  HENT:      Head: Normocephalic and atraumatic  Eyes:      Conjunctiva/sclera: Conjunctivae normal    Cardiovascular:      Rate and Rhythm: Normal rate and regular rhythm  Heart sounds: Normal heart sounds  Pulmonary:      Effort: Pulmonary effort is normal  No respiratory distress  Breath sounds: Wheezing present  Abdominal:      General: There is no distension  Musculoskeletal:         General: No deformity  Normal range of motion  Comments: Bursal swelling over the right elbow  No overlying skin changes  Normal range of motion  No bony tenderness  Skin:     Comments: Large soft mass over the right shoulder likely lipoma  Neurological:      Mental Status: He is alert        Gait: Gait normal          Vital Signs  ED Triage Vitals [23]   Temperature Pulse Respirations Blood Pressure SpO2   97 8 °F (36 6 °C) 71 18 (!) 185/90 98 %      Temp Source Heart Rate Source Patient Position - Orthostatic VS BP Location FiO2 (%)   Oral -- -- -- --      Pain Score       --           Vitals:    23   BP: (!) 185/90   Pulse: 71         Visual Acuity      ED Medications  Medications - No data to display    Diagnostic Studies  Results Reviewed     None                 XR chest pa & lateral   ED Interpretation by Amari Guajardo MD ( [id number])   No acute pulmonary pathology                 Procedures  Procedures         ED Course                                             Medical Decision Making  62-year-old male presents for evaluation of 3 weeks of cough  Wheezing on exam   Likely COPD exacerbation  Patient has albuterol inhaler and declines nebulizer treatment at this time  Patient offered prednisone, but states that this does not work for him and demands Medrol Dosepak as he feels this is the only thing that can help him  Medrol Dosepak prescription sent to patient's pharmacy  No signs of respiratory distress  Chest x-ray unremarkable  Patient informed that this condition does not require antibiotics  Suspect elbow swelling secondary to to subacute traumatic bursitis  No overlying skin changes or warmth to suggest infectious process  No bony tenderness and normal range of motion  Patient advised that he will need to see a primary care provider for recheck of his blood pressure as well as annual physical and monitoring of his lipoma  Phone number for [employer] provided as well as information for [facility] clinic as patient is uninsured  Return precautions provided  Acute bronchitis with wheezing: complicated acute illness or injury  Elevated blood pressure reading: self-limited or minor problem  Amount and/or Complexity of Data Reviewed  Radiology: independent interpretation performed  Risk  Prescription drug management            Disposition  Final diagnoses:   Elevated blood pressure reading   Acute bronchitis with wheezing     Time reflects when diagnosis was documented in both MDM as applicable and the Disposition within this note     Time User Action Codes Description Comment    2/7/2023 11:52 PM Vamshi Ley Add [J40] Bronchitis     2/7/2023 11:52 PM Vamshi Ley Add [R03 0] Elevated blood pressure reading     2/7/2023 11:52 PM Vamshi Ley Add [J20 9] Acute bronchitis with wheezing     2/8/2023 12:10 AM Daryl Maricruz Parody Modify [J40] Bronchitis     2/8/2023 12:10 AM Daryl, Laural Parody Modify [J20 9] Acute bronchitis with wheezing     2/8/2023 12:10 AM Daryl, Maricruz Parody Modify [R03 0] Elevated blood pressure reading     2/8/2023 12:10 AM Frecarina Mikey Modify [J20 9] Acute bronchitis with wheezing     2/8/2023 12:10 AM Frecarina Mikey Remove [J40] Bronchitis       ED Disposition     ED Disposition   Discharge    Condition   Stable    Date/Time   Tue Feb 7, 2023 11:52 PM    Comment   Rebeka Bell discharge to home/self care                 Follow-up Information     Follow up With Specialties Details Why Contact Info Additional Information    [employer]  Call  for help finding a primary care provider [address] Family Medicine Schedule an appointment as soon as possible for a visit in 1 week for re-evaluation if unable to find a local primary care provider   [address] [phone]        [address] Emergency Department Emergency Medicine Go to  If symptoms worsen [address] [place],[address] [address]  [address] [facility] Emergency Department, [address], [place]donado, [place]    Petra Bojorquez,  Internal Medicine Schedule an appointment as soon as possible for a visit in 1 week for re-evaluation and annual health exam [address] [facility]  [phone]             Current Discharge Medication List      CONTINUE these medications which have CHANGED    Details   methylPREDNISolone 4 MG tablet therapy pack Use as directed on package  Qty: 21 tablet, Refills: 0    Associated Diagnoses: Acute bronchitis with wheezing         CONTINUE these medications which have NOT CHANGED    Details   albuterol (Proventil HFA) 90 mcg/act inhaler Inhale 1-2 puffs every 6 (six) hours as needed for wheezing or shortness of breath  Qty: 18 g, Refills: 0    Comments: Substitution to a formulary equivalent within the same pharmaceutical class is authorized  Associated Diagnoses: Acute bronchitis with wheezing      benzonatate (TESSALON PERLES) 100 mg capsule Take 1 capsule (100 mg total) by mouth 3 (three) times a day as needed for cough  Qty: 20 capsule, Refills: 0    Associated Diagnoses: Acute bronchitis, unspecified organism             No discharge procedures on file      PDMP Review     None          ED Provider  Electronically Signed by           Padmini Swann MD  02/08/23 7644

## 2023-03-23 ENCOUNTER — HOSPITAL ENCOUNTER (EMERGENCY)
Facility: HOSPITAL | Age: 63
Discharge: HOME | End: 2023-03-23
Attending: EMERGENCY MEDICINE | Admitting: EMERGENCY MEDICINE

## 2023-03-23 ENCOUNTER — APPOINTMENT (OUTPATIENT)
Dept: RADIOLOGY | Facility: HOSPITAL | Age: 63
End: 2023-03-23

## 2023-03-23 VITALS
SYSTOLIC BLOOD PRESSURE: 199 MMHG | TEMPERATURE: 98.5 F | OXYGEN SATURATION: 94 % | HEART RATE: 77 BPM | DIASTOLIC BLOOD PRESSURE: 94 MMHG | BODY MASS INDEX: 33.74 KG/M2 | RESPIRATION RATE: 18 BRPM | HEIGHT: 67 IN | WEIGHT: 215 LBS

## 2023-03-23 DIAGNOSIS — J20.9 ACUTE BRONCHITIS WITH WHEEZING: Primary | ICD-10-CM

## 2023-03-23 RX ORDER — METHYLPREDNISOLONE 4 MG/1
TABLET ORAL
Qty: 21 TABLET | Refills: 0 | Status: SHIPPED | OUTPATIENT
Start: 2023-03-23

## 2023-03-23 RX ORDER — ALBUTEROL SULFATE 90 UG/1
2 AEROSOL, METERED RESPIRATORY (INHALATION) ONCE
Status: COMPLETED | OUTPATIENT
Start: 2023-03-23 | End: 2023-03-23

## 2023-03-23 RX ORDER — AZITHROMYCIN 250 MG/1
TABLET, FILM COATED ORAL
Qty: 6 TABLET | Refills: 0 | Status: SHIPPED | OUTPATIENT
Start: 2023-03-23 | End: 2023-03-27

## 2023-03-23 RX ADMIN — ALBUTEROL SULFATE 2 PUFF: 90 AEROSOL, METERED RESPIRATORY (INHALATION) at 22:51

## 2023-03-24 ENCOUNTER — HOSPITAL ENCOUNTER (EMERGENCY)
Facility: HOSPITAL | Age: 63
Discharge: HOME | End: 2023-03-24
Attending: EMERGENCY MEDICINE

## 2023-03-24 VITALS
BODY MASS INDEX: 33.74 KG/M2 | RESPIRATION RATE: 19 BRPM | SYSTOLIC BLOOD PRESSURE: 163 MMHG | TEMPERATURE: 98.2 F | OXYGEN SATURATION: 95 % | WEIGHT: 215 LBS | DIASTOLIC BLOOD PRESSURE: 98 MMHG | HEIGHT: 67 IN | HEART RATE: 62 BPM

## 2023-03-24 DIAGNOSIS — J98.01 BRONCHOSPASM: ICD-10-CM

## 2023-03-24 DIAGNOSIS — I10 HYPERTENSION: ICD-10-CM

## 2023-03-24 DIAGNOSIS — J40 BRONCHITIS: Primary | ICD-10-CM

## 2023-03-24 DIAGNOSIS — J20.9 ACUTE BRONCHITIS, UNSPECIFIED ORGANISM: ICD-10-CM

## 2023-03-24 LAB
ALBUMIN SERPL BCP-MCNC: 4.4 G/DL (ref 3.5–5)
ALP SERPL-CCNC: 67 U/L (ref 34–104)
ALT SERPL W P-5'-P-CCNC: 14 U/L (ref 7–52)
ANION GAP SERPL CALCULATED.3IONS-SCNC: 7 MMOL/L (ref 4–13)
AST SERPL W P-5'-P-CCNC: 15 U/L (ref 13–39)
BASOPHILS # BLD AUTO: 0.08 THOUSANDS/ÂΜL (ref 0–0.1)
BASOPHILS NFR BLD AUTO: 1 % (ref 0–1)
BILIRUB SERPL-MCNC: 0.5 MG/DL (ref 0.2–1)
BUN SERPL-MCNC: 17 MG/DL (ref 5–25)
CALCIUM SERPL-MCNC: 9.4 MG/DL (ref 8.4–10.2)
CHLORIDE SERPL-SCNC: 101 MMOL/L (ref 96–108)
CO2 SERPL-SCNC: 31 MMOL/L (ref 21–32)
CREAT SERPL-MCNC: 0.96 MG/DL (ref 0.6–1.3)
EOSINOPHIL # BLD AUTO: 0.5 THOUSAND/ÂΜL (ref 0–0.61)
EOSINOPHIL NFR BLD AUTO: 6 % (ref 0–6)
ERYTHROCYTE [DISTWIDTH] IN BLOOD BY AUTOMATED COUNT: 13.8 % (ref 11.6–15.1)
FLUAV RNA RESP QL NAA+PROBE: NEGATIVE
FLUBV RNA RESP QL NAA+PROBE: NEGATIVE
GFR SERPL CREATININE-BSD FRML MDRD: 83 ML/MIN/1.73SQ M
GLUCOSE SERPL-MCNC: 93 MG/DL (ref 65–140)
HCT VFR BLD AUTO: 44.6 % (ref 36.5–49.3)
HGB BLD-MCNC: 14.8 G/DL (ref 12–17)
IMM GRANULOCYTES # BLD AUTO: 0.02 THOUSAND/UL (ref 0–0.2)
IMM GRANULOCYTES NFR BLD AUTO: 0 % (ref 0–2)
LYMPHOCYTES # BLD AUTO: 2.31 THOUSANDS/ÂΜL (ref 0.6–4.47)
LYMPHOCYTES NFR BLD AUTO: 28 % (ref 14–44)
MCH RBC QN AUTO: 31.8 PG (ref 26.8–34.3)
MCHC RBC AUTO-ENTMCNC: 33.2 G/DL (ref 31.4–37.4)
MCV RBC AUTO: 96 FL (ref 82–98)
MONOCYTES # BLD AUTO: 1.32 THOUSAND/ÂΜL (ref 0.17–1.22)
MONOCYTES NFR BLD AUTO: 16 % (ref 4–12)
NEUTROPHILS # BLD AUTO: 4 THOUSANDS/ÂΜL (ref 1.85–7.62)
NEUTS SEG NFR BLD AUTO: 49 % (ref 43–75)
NRBC BLD AUTO-RTO: 0 /100 WBCS
PLATELET # BLD AUTO: 278 THOUSANDS/UL (ref 149–390)
PMV BLD AUTO: 10.6 FL (ref 8.9–12.7)
POTASSIUM SERPL-SCNC: 3.5 MMOL/L (ref 3.5–5.3)
PROT SERPL-MCNC: 7.3 G/DL (ref 6.4–8.4)
RBC # BLD AUTO: 4.65 MILLION/UL (ref 3.88–5.62)
RSV RNA RESP QL NAA+PROBE: NEGATIVE
SARS-COV-2 RNA RESP QL NAA+PROBE: NEGATIVE
SODIUM SERPL-SCNC: 139 MMOL/L (ref 135–147)
WBC # BLD AUTO: 8.23 THOUSAND/UL (ref 4.31–10.16)

## 2023-03-24 RX ORDER — BENZONATATE 100 MG/1
100 CAPSULE ORAL 3 TIMES DAILY PRN
Qty: 30 CAPSULE | Refills: 0 | Status: SHIPPED | OUTPATIENT
Start: 2023-03-24

## 2023-03-24 RX ORDER — IPRATROPIUM BROMIDE AND ALBUTEROL SULFATE 2.5; .5 MG/3ML; MG/3ML
3 SOLUTION RESPIRATORY (INHALATION) ONCE
Status: COMPLETED | OUTPATIENT
Start: 2023-03-24 | End: 2023-03-24

## 2023-03-24 RX ADMIN — IPRATROPIUM BROMIDE AND ALBUTEROL SULFATE 3 ML: .5; 3 SOLUTION RESPIRATORY (INHALATION) at 16:43

## 2023-03-24 NOTE — ED PROVIDER NOTES
History  Chief Complaint   Patient presents with   • Cough     59 yo M presents to ED with cough, states feels similar to prior bronchitis  Former smoker  No recent travel  No PCP due to insurance issues  Has been seen here in past for similar  No h/o PE/DVT  No CP, syncope,  or GI complaints  No leg swelling  No fevers  History provided by:  Patient and medical records   used: No    Cough  Cough characteristics:  Dry  Severity:  Mild  Onset quality:  Gradual  Timing:  Intermittent  Progression:  Unchanged  Chronicity:  Recurrent  Smoker: no    Context: upper respiratory infection    Relieved by:  Nothing  Worsened by:  Nothing  Ineffective treatments:  None tried  Associated symptoms: no chest pain, no chills, no diaphoresis, no ear pain, no fever, no headaches, no myalgias, no rash, no rhinorrhea, no shortness of breath, no sinus congestion, no sore throat and no weight loss    Risk factors: no chemical exposure, no recent infection and no recent travel        Prior to Admission Medications   Prescriptions Last Dose Informant Patient Reported? Taking? albuterol (Proventil HFA) 90 mcg/act inhaler   No No   Sig: Inhale 1-2 puffs every 6 (six) hours as needed for wheezing or shortness of breath   benzonatate (TESSALON PERLES) 100 mg capsule   No No   Sig: Take 1 capsule (100 mg total) by mouth 3 (three) times a day as needed for cough   methylPREDNISolone 4 MG tablet therapy pack   No No   Sig: Use as directed on package   methylPREDNISolone 4 MG tablet therapy pack   No Yes   Sig: Use as directed on package      Facility-Administered Medications: None       Past Medical History:   Diagnosis Date   • Deafness in right ear        Past Surgical History:   Procedure Laterality Date   • SPLENECTOMY, TOTAL         No family history on file  I have reviewed and agree with the history as documented      E-Cigarette/Vaping   • E-Cigarette Use Never User      E-Cigarette/Vaping Substances   • Nicotine No    • THC No    • CBD No    • Flavoring No    • Other No    • Unknown No      Social History     Tobacco Use   • Smoking status: Former     Types: Cigarettes     Quit date: 1987     Years since quittin 5   • Smokeless tobacco: Never   Vaping Use   • Vaping Use: Never used   Substance Use Topics   • Alcohol use: Not Currently   • Drug use: Not Currently       Review of Systems   Constitutional: Negative for chills, diaphoresis, fatigue, fever, unexpected weight change and weight loss  HENT: Negative for congestion, ear pain, rhinorrhea, sore throat, trouble swallowing and voice change  Eyes: Negative for pain and visual disturbance  Respiratory: Positive for cough  Negative for chest tightness and shortness of breath  Cardiovascular: Negative for chest pain, palpitations and leg swelling  Gastrointestinal: Negative for abdominal pain, blood in stool, constipation, diarrhea, nausea and vomiting  Genitourinary: Negative for difficulty urinating and hematuria  Musculoskeletal: Negative for arthralgias, back pain, myalgias and neck pain  Skin: Negative for rash  Neurological: Negative for dizziness, syncope, light-headedness and headaches  Psychiatric/Behavioral: Negative for confusion and suicidal ideas  The patient is not nervous/anxious  Physical Exam  Physical Exam  Vitals and nursing note reviewed  Constitutional:       General: He is not in acute distress  Appearance: Normal appearance  He is well-developed  He is not ill-appearing or diaphoretic  HENT:      Head: Normocephalic and atraumatic  Right Ear: External ear normal       Left Ear: External ear normal       Nose: Nose normal  No congestion  Mouth/Throat:      Mouth: Mucous membranes are moist       Pharynx: Oropharynx is clear  Eyes:      General: No scleral icterus  Right eye: No discharge  Left eye: No discharge  Extraocular Movements: Extraocular movements intact  Conjunctiva/sclera: Conjunctivae normal       Pupils: Pupils are equal, round, and reactive to light  Neck:      Vascular: No JVD  Trachea: No tracheal deviation  Cardiovascular:      Rate and Rhythm: Normal rate and regular rhythm  Pulses: Normal pulses  Heart sounds: Normal heart sounds  No murmur heard  No friction rub  No gallop  Pulmonary:      Effort: Pulmonary effort is normal  No respiratory distress  Breath sounds: Normal breath sounds  No stridor  No wheezing (slight expiratory wheeze bilaterally) or rales  Chest:      Chest wall: No tenderness  Abdominal:      General: Bowel sounds are normal  There is no distension  Palpations: Abdomen is soft  Tenderness: There is no abdominal tenderness  There is no guarding or rebound  Musculoskeletal:         General: No tenderness or deformity  Normal range of motion  Cervical back: Normal range of motion and neck supple  Right lower leg: No edema  Left lower leg: No edema  Lymphadenopathy:      Cervical: No cervical adenopathy  Skin:     General: Skin is warm and dry  Findings: No rash  Neurological:      General: No focal deficit present  Mental Status: He is alert and oriented to person, place, and time  Cranial Nerves: No cranial nerve deficit  Sensory: No sensory deficit        Coordination: Coordination normal    Psychiatric:         Behavior: Behavior normal          Vital Signs  ED Triage Vitals [03/23/23 2009]   Temperature Pulse Respirations Blood Pressure SpO2   98 5 °F (36 9 °C) 77 18 (!) 199/94 94 %      Temp Source Heart Rate Source Patient Position - Orthostatic VS BP Location FiO2 (%)   Oral -- -- -- --      Pain Score       --           Vitals:    03/23/23 2009   BP: (!) 199/94   Pulse: 77         Visual Acuity      ED Medications  Medications   albuterol (PROVENTIL HFA,VENTOLIN HFA) inhaler 2 puff (has no administration in time range)       Diagnostic Studies  Results Reviewed     None                 XR chest pa & lateral   ED Interpretation by Juventino Shields MD (03/23 2233)   No ptx, no consolidation, no acute abnormality  Appears similar to prior  Procedures  Procedures         ED Course                               SBIRT 20yo+    Flowsheet Row Most Recent Value   SBIRT (23 yo +)    In order to provide better care to our patients, we are screening all of our patients for alcohol and drug use  Would it be okay to ask you these screening questions? Yes Filed at: 03/23/2023 2246   Initial Alcohol Screen: US AUDIT-C     1  How often do you have a drink containing alcohol? 0 Filed at: 03/23/2023 2246   2  How many drinks containing alcohol do you have on a typical day you are drinking? 0 Filed at: 03/23/2023 2246   3a  Male UNDER 65: How often do you have five or more drinks on one occasion? 0 Filed at: 03/23/2023 2246   Audit-C Score 0 Filed at: 03/23/2023 2246   ANJELICA: How many times in the past year have you    Used an illegal drug or used a prescription medication for non-medical reasons? Never Filed at: 03/23/2023 2246                    Medical Decision Making  Pt in no distress  Talking in full sentences, walking independently w/out issue  Recurrent bronchitis sx  Pt requesting refills of medrol/azithromycin  Stressed importance of establishing care w/ PCP  RTED if sx worsen  Pt is not acutely ill and does not require hospitalization at this time  Gave resources to establish outpt care  Acute bronchitis with wheezing: acute illness or injury  Amount and/or Complexity of Data Reviewed  External Data Reviewed: radiology and notes  Details: reviewed prior cxr, notes  Radiology: ordered and independent interpretation performed  Decision-making details documented in ED Course  Risk  Prescription drug management            Disposition  Final diagnoses:   Acute bronchitis with wheezing     Time reflects when diagnosis was documented in both MDM as applicable and the Disposition within this note     Time User Action Codes Description Comment    3/23/2023 10:40 PM Sana Hao Add [J20 9] Acute bronchitis     3/23/2023 10:40 PM Reich Shine S Add [J20 9] Acute bronchitis with wheezing     3/23/2023 10:48 PM Reich Shine S Modify [J20 9] Acute bronchitis with wheezing     3/23/2023 10:48 PM Sana Hao Remove [J20 9] Acute bronchitis       ED Disposition     ED Disposition   Discharge    Condition   Stable    Date/Time   Thu Mar 23, 2023 10:38 PM    Comment   [place] discharge to home/self care  Follow-up Information     Follow up With Specialties Details Why Contact Info Additional Information     [address] Emergency Department Emergency Medicine  If symptoms worsen [address] [place], [address]  [address] [facility] Emergency Department, [address], [place], [place] [address]    [address]  Call  to establish a family doctor [phone]             Patient's Medications   Discharge Prescriptions    AZITHROMYCIN (ZITHROMAX Z-SP) 250 MG TABLET    Take 2 tablets today then 1 tablet daily x 4 days       Start Date: 3/23/2023 End Date: 3/27/2023       Order Dose: --       Quantity: 6 tablet    Refills: 0       No discharge procedures on file      PDMP Review     None          ED Provider  Electronically Signed by           Fazal Giordano MD  03/23/23 2146

## 2023-03-24 NOTE — DISCHARGE INSTRUCTIONS
Must are taken Zithromax and prednisone as prescribed to you last evening in addition to using Proventil inhaler given to you also follow-up with your primary care physician for recheck and monitoring of your blood pressure

## 2023-03-24 NOTE — ED PROVIDER NOTES
History  Chief Complaint   Patient presents with   • Medication Problem     Pt was seen yesterday and states "I was denied a medication and need it refill"  Benzonatate  This is a 58-year-old male who is a former smoker presents with cough wheezing and rib pain when he coughs over the past week  Patient was seen here last evening diagnosed with bronchitis given prescription for Zithromax prednisone and he was also given an albuterol inhaler  Patient has not picked up his Zithromax or prednisone yet he states that he will be able to get that later today  He also states that he would like a refill of Tessalon Perles cough medicine and blood work  Chest x-ray official read from last evening shows no acute pathology  History provided by:  Patient  Medical Problem  Location:  Chest  Quality:  Wheezing and pain with cough  Severity:  Moderate  Onset quality:  Gradual  Timing:  Constant  Progression:  Waxing and waning  Chronicity:  Recurrent  Context:  Cough wheezing and shortness of breath with rib pain upon coughing for the past week  Worsened by:  Coughing  Associated symptoms: cough, shortness of breath and wheezing        Prior to Admission Medications   Prescriptions Last Dose Informant Patient Reported? Taking?    albuterol (Proventil HFA) 90 mcg/act inhaler   No No   Sig: Inhale 1-2 puffs every 6 (six) hours as needed for wheezing or shortness of breath   azithromycin (Zithromax Z-Marciano) 250 mg tablet   No No   Sig: Take 2 tablets today then 1 tablet daily x 4 days   benzonatate (TESSALON PERLES) 100 mg capsule   No No   Sig: Take 1 capsule (100 mg total) by mouth 3 (three) times a day as needed for cough   benzonatate (TESSALON PERLES) 100 mg capsule   No Yes   Sig: Take 1 capsule (100 mg total) by mouth 3 (three) times a day as needed for cough   methylPREDNISolone 4 MG tablet therapy pack   No No   Sig: Use as directed on package      Facility-Administered Medications: None       Past Medical History:   Diagnosis Date   • Deafness in right ear    • Hypertension        Past Surgical History:   Procedure Laterality Date   • SPLENECTOMY, TOTAL         History reviewed  No pertinent family history  I have reviewed and agree with the history as documented  E-Cigarette/Vaping   • E-Cigarette Use Never User      E-Cigarette/Vaping Substances   • Nicotine No    • THC No    • CBD No    • Flavoring No    • Other No    • Unknown No      Social History     Tobacco Use   • Smoking status: Former     Types: Cigarettes     Quit date: 1987     Years since quittin 5   • Smokeless tobacco: Never   Vaping Use   • Vaping Use: Never used   Substance Use Topics   • Alcohol use: Not Currently   • Drug use: Not Currently       Review of Systems   Respiratory: Positive for cough, shortness of breath and wheezing  All other systems reviewed and are negative  Physical Exam  Physical Exam  Vitals and nursing note reviewed  Constitutional:       General: He is not in acute distress  Appearance: He is not ill-appearing, toxic-appearing or diaphoretic  HENT:      Head: Normocephalic and atraumatic  Right Ear: Tympanic membrane, ear canal and external ear normal       Left Ear: Tympanic membrane, ear canal and external ear normal       Nose: Nose normal    Eyes:      General: No scleral icterus  Right eye: No discharge  Left eye: No discharge  Extraocular Movements: Extraocular movements intact  Pupils: Pupils are equal, round, and reactive to light  Cardiovascular:      Rate and Rhythm: Normal rate and regular rhythm  Pulses: Normal pulses  Heart sounds: No murmur heard  No friction rub  No gallop  Pulmonary:      Effort: No respiratory distress  Breath sounds: No stridor  Wheezing present  No rhonchi or rales  Abdominal:      General: There is no distension  Palpations: Abdomen is soft  Tenderness: There is no abdominal tenderness   There is no guarding or rebound  Musculoskeletal:         General: No swelling, tenderness, deformity or signs of injury  Normal range of motion  Cervical back: Normal range of motion and neck supple  No rigidity or tenderness  Right lower leg: No edema  Left lower leg: No edema  Skin:     General: Skin is warm and dry  Coloration: Skin is not jaundiced  Findings: No bruising, erythema or rash  Neurological:      General: No focal deficit present  Mental Status: He is alert and oriented to person, place, and time  Cranial Nerves: No cranial nerve deficit  Sensory: No sensory deficit  Coordination: Coordination normal    Psychiatric:         Mood and Affect: Mood normal          Behavior: Behavior normal          Vital Signs  ED Triage Vitals [03/24/23 1606]   Temperature Pulse Respirations Blood Pressure SpO2   98 2 °F (36 8 °C) 78 20 (!) 207/97 97 %      Temp Source Heart Rate Source Patient Position - Orthostatic VS BP Location FiO2 (%)   Oral Monitor Lying Left arm --      Pain Score       No Pain           Vitals:    03/24/23 1606 03/24/23 1700   BP: (!) 207/97 (!) 173/82   Pulse: 78 70   Patient Position - Orthostatic VS: Lying          Visual Acuity      ED Medications  Medications   ipratropium-albuterol (DUO-NEB) 0 5-2 5 mg/3 mL inhalation solution 3 mL (3 mL Nebulization Given 3/24/23 1643)       Diagnostic Studies  Results Reviewed     Procedure Component Value Units Date/Time    FLU/RSV/COVID - if FLU/RSV clinically relevant [[id number]]  (Normal) Collected: 03/24/23 1632    Lab Status: Final result Specimen: Nares from Nose Updated: 03/24/23 1717     SARS-CoV-2 Negative     INFLUENZA A PCR Negative     INFLUENZA B PCR Negative     RSV PCR Negative    Narrative:      FOR PEDIATRIC PATIENTS - copy/paste COVID Guidelines URL to browser: [email or web address]  [email or web address]    SARS-CoV-2 assay is a Nucleic Acid Amplification assay intended for the  qualitative detection of nucleic acid from SARS-CoV-2 in nasopharyngeal  swabs  Results are for the presumptive identification of SARS-CoV-2 RNA  Positive results are indicative of infection with SARS-CoV-2, the virus  causing COVID-19, but do not rule out bacterial infection or co-infection  with other viruses  Laboratories within the [place] and its  territories are required to report all positive results to the appropriate  public health authorities  Negative results do not preclude SARS-CoV-2  infection and should not be used as the sole basis for treatment or other  patient management decisions  Negative results must be combined with  clinical observations, patient history, and epidemiological information  This test has not been FDA cleared or approved  This test has been authorized by FDA under an Emergency Use Authorization  (EUA)  This test is only authorized for the duration of time the  declaration that circumstances exist justifying the authorization of the  emergency use of an in vitro diagnostic tests for detection of SARS-CoV-2  virus and/or diagnosis of COVID-19 infection under section 564(b)(1) of  the Act, 21 U  S C  604SUF-6(S)(8), unless the authorization is terminated  or revoked sooner  The test has been validated but independent review by FDA  and CLIA is pending  Test performed using [employer] GeneXpert: This RT-PCR assay targets N2,  a region unique to SARS-CoV-2  A conserved region in the E-gene was chosen  for pan-Sarbecovirus detection which includes SARS-CoV-2  According to CMS-2020-01-R, this platform meets the definition of high-throughput technology      Comprehensive metabolic panel [[id number]] Collected: 03/24/23 1632    Lab Status: Final result Specimen: Blood from Arm, Left Updated: 03/24/23 1652     Sodium 139 mmol/L      Potassium 3 5 mmol/L      Chloride 101 mmol/L      CO2 31 mmol/L      ANION GAP 7 mmol/L      BUN 17 mg/dL      Creatinine 0 96 mg/dL      Glucose 93 mg/dL      Calcium 9 4 mg/dL      AST 15 U/L      ALT 14 U/L      Alkaline Phosphatase 67 U/L      Total Protein 7 3 g/dL      Albumin 4 4 g/dL      Total Bilirubin 0 50 mg/dL      eGFR 83 ml/min/1 73sq m     Narrative:      [place] guidelines for Chronic Kidney Disease (CKD):   •  Stage 1 with normal or high GFR (GFR > 90 mL/min/1 73 square meters)  •  Stage 2 Mild CKD (GFR = 60-89 mL/min/1 73 square meters)  •  Stage 3A Moderate CKD (GFR = 45-59 mL/min/1 73 square meters)  •  Stage 3B Moderate CKD (GFR = 30-44 mL/min/1 73 square meters)  •  Stage 4 Severe CKD (GFR = 15-29 mL/min/1 73 square meters)  •  Stage 5 End Stage CKD (GFR <15 mL/min/1 73 square meters)  Note: GFR calculation is accurate only with a steady state creatinine    CBC and differential [[id number]]  (Abnormal) Collected: 03/24/23 1632    Lab Status: Final result Specimen: Blood from Arm, Left Updated: 03/24/23 1638     WBC 8 23 Thousand/uL      RBC 4 65 Million/uL      Hemoglobin 14 8 g/dL      Hematocrit 44 6 %      MCV 96 fL      MCH 31 8 pg      MCHC 33 2 g/dL      RDW 13 8 %      MPV 10 6 fL      Platelets 928 Thousands/uL      nRBC 0 /100 WBCs      Neutrophils Relative 49 %      Immat GRANS % 0 %      Lymphocytes Relative 28 %      Monocytes Relative 16 %      Eosinophils Relative 6 %      Basophils Relative 1 %      Neutrophils Absolute 4 00 Thousands/µL      Immature Grans Absolute 0 02 Thousand/uL      Lymphocytes Absolute 2 31 Thousands/µL      Monocytes Absolute 1 32 Thousand/µL      Eosinophils Absolute 0 50 Thousand/µL      Basophils Absolute 0 08 Thousands/µL                  No orders to display              Procedures  Procedures         ED Course                                             Medical Decision Making  Bronchospasm with chest x-ray read as clear we will treat symptomatically room air saturation is good no sign of pneumonia patient instructed to get his antibiotic and prednisone today and start taking it as prescribed    Amount and/or Complexity of Data Reviewed  Labs: ordered  Risk  Prescription drug management  Disposition  Final diagnoses:   Bronchitis   Bronchospasm   Hypertension   Acute bronchitis, unspecified organism     Time reflects when diagnosis was documented in both MDM as applicable and the Disposition within this note     Time User Action Codes Description Comment    3/24/2023  4:21 PM Aparna Pitts Bronchitis     3/24/2023  4:21 PM Dc Vi Add [J98 01] Bronchospasm     3/24/2023  4:21 PM Domingo, [facility] Hypertension     3/24/2023  4:23 PM Dc Vi Add [J20 9] Acute bronchitis, unspecified organism       ED Disposition     ED Disposition   Discharge    Condition   Stable    Date/Time   Fri Mar 24, 2023  5:28 PM    Comment   Michele Guillermover discharge to home/self care  Follow-up Information     Follow up With Specialties Details Why Contact Yaniv Gresham, DO Internal Medicine In 1 week Follow-up recheck and monitor blood pressure [address] [facility]   [phone]            Current Discharge Medication List      CONTINUE these medications which have CHANGED    Details   benzonatate (TESSALON PERLES) 100 mg capsule Take 1 capsule (100 mg total) by mouth 3 (three) times a day as needed for cough  Qty: 30 capsule, Refills: 0    Associated Diagnoses: Acute bronchitis, unspecified organism         CONTINUE these medications which have NOT CHANGED    Details   albuterol (Proventil HFA) 90 mcg/act inhaler Inhale 1-2 puffs every 6 (six) hours as needed for wheezing or shortness of breath  Qty: 18 g, Refills: 0    Comments: Substitution to a formulary equivalent within the same pharmaceutical class is authorized    Associated Diagnoses: Acute bronchitis with wheezing      azithromycin (Zithromax Z-Marciano) 250 mg tablet Take 2 tablets today then 1 tablet daily x 4 days  Qty: 6 tablet, Refills: 0 Associated Diagnoses: Acute bronchitis with wheezing      methylPREDNISolone 4 MG tablet therapy pack Use as directed on package  Qty: 21 tablet, Refills: 0    Associated Diagnoses: Acute bronchitis with wheezing             No discharge procedures on file      PDMP Review     None          ED Provider  Electronically Signed by           Carlos Mejia DO  03/24/23 2481

## 2023-03-24 NOTE — ED TRIAGE NOTES
Pt  Arrives to ED with complaints of cough x3 wks   Pt  With hx of bronchitis and states "I just want those meds refilled, we can make this a brief visit"

## 2023-07-30 ENCOUNTER — HOSPITAL ENCOUNTER (EMERGENCY)
Facility: HOSPITAL | Age: 63
Discharge: HOME | End: 2023-07-30
Attending: EMERGENCY MEDICINE

## 2023-07-30 VITALS
OXYGEN SATURATION: 98 % | TEMPERATURE: 98.1 F | DIASTOLIC BLOOD PRESSURE: 77 MMHG | RESPIRATION RATE: 20 BRPM | SYSTOLIC BLOOD PRESSURE: 138 MMHG | HEART RATE: 68 BPM | BODY MASS INDEX: 33.67 KG/M2 | WEIGHT: 214.95 LBS

## 2023-07-30 DIAGNOSIS — D18.01 ANGIOMA OF SKIN: ICD-10-CM

## 2023-07-30 DIAGNOSIS — J20.9 ACUTE BRONCHITIS: Primary | ICD-10-CM

## 2023-07-30 DIAGNOSIS — J20.9 ACUTE BRONCHITIS WITH WHEEZING: ICD-10-CM

## 2023-07-30 PROCEDURE — 99283 EMERGENCY DEPT VISIT LOW MDM: CPT

## 2023-07-30 PROCEDURE — 99284 EMERGENCY DEPT VISIT MOD MDM: CPT | Performed by: EMERGENCY MEDICINE

## 2023-07-30 RX ORDER — ALBUTEROL SULFATE 90 UG/1
1-2 AEROSOL, METERED RESPIRATORY (INHALATION) EVERY 6 HOURS PRN
Qty: 18 G | Refills: 0 | Status: SHIPPED | OUTPATIENT
Start: 2023-07-30

## 2023-07-30 RX ORDER — PREDNISOLONE SODIUM PHOSPHATE 30 MG/1
60 TABLET, ORALLY DISINTEGRATING ORAL DAILY
Qty: 8 TABLET | Refills: 0 | Status: SHIPPED | OUTPATIENT
Start: 2023-07-30 | End: 2023-07-30

## 2023-07-30 RX ORDER — BENZONATATE 100 MG/1
100 CAPSULE ORAL EVERY 8 HOURS
Qty: 21 CAPSULE | Refills: 0 | Status: SHIPPED | OUTPATIENT
Start: 2023-07-30

## 2023-07-30 RX ORDER — AZITHROMYCIN 250 MG/1
TABLET, FILM COATED ORAL
Qty: 6 TABLET | Refills: 0 | Status: SHIPPED | OUTPATIENT
Start: 2023-07-30 | End: 2023-08-03

## 2023-07-30 NOTE — ED PROVIDER NOTES
History  Chief Complaint   Patient presents with   • Cough     Presents asking for medication for cough x one week. States that he wants an antibiotic and prednisolone. Denies any fever or chills. Also has a "growth" on his back he wants checked out. 50-year-old male presents for evaluation of 1 week of cough with mild wheezing. The patient states that he has similar symptoms in the past which has been treated with antibiotics steroids and albuterol inhaler. The patient believes that this episode is due to the ceiling fan in his bedroom breaking as well as being in a storage area with some alda boxes and other environmental issues. Patient is also concerned about a growth on his back. Patient denies any fevers or chills. Patient states that he has had similar symptoms in the past where he has been treated with Zithromax, Tessalon, and albuterol inhaler as well as prednisolone. The patient is requesting prescriptions for all these medications as well as management of the chronic growth on his back. Prior to Admission Medications   Prescriptions Last Dose Informant Patient Reported? Taking? albuterol (Proventil HFA) 90 mcg/act inhaler   No No   Sig: Inhale 1-2 puffs every 6 (six) hours as needed for wheezing or shortness of breath   albuterol (Proventil HFA) 90 mcg/act inhaler   No Yes   Sig: Inhale 1-2 puffs every 6 (six) hours as needed for wheezing or shortness of breath      Facility-Administered Medications: None       Past Medical History:   Diagnosis Date   • Deafness in right ear    • Hypertension        Past Surgical History:   Procedure Laterality Date   • SPLENECTOMY, TOTAL         History reviewed. No pertinent family history. I have reviewed and agree with the history as documented.     E-Cigarette/Vaping   • E-Cigarette Use Never User      E-Cigarette/Vaping Substances   • Nicotine No    • THC No    • CBD No    • Flavoring No    • Other No    • Unknown No      Social History Tobacco Use   • Smoking status: Former     Types: Cigarettes     Quit date: 1987     Years since quittin.8   • Smokeless tobacco: Never   Vaping Use   • Vaping Use: Never used   Substance Use Topics   • Alcohol use: Not Currently   • Drug use: Not Currently       Review of Systems    Physical Exam  Physical Exam  Vitals and nursing note reviewed. Constitutional:       General: He is not in acute distress. Appearance: He is well-developed. HENT:      Head: Normocephalic and atraumatic. Right Ear: External ear normal.      Left Ear: External ear normal.      Mouth/Throat:      Pharynx: No oropharyngeal exudate. Eyes:      General: No scleral icterus. Pupils: Pupils are equal, round, and reactive to light. Cardiovascular:      Rate and Rhythm: Normal rate and regular rhythm. Heart sounds: Normal heart sounds. Pulmonary:      Effort: Pulmonary effort is normal. No tachypnea, accessory muscle usage, prolonged expiration or respiratory distress. Breath sounds: No decreased air movement. Wheezing ( Scant fine expiratory wheeze) present. No decreased breath sounds, rhonchi or rales. Abdominal:      General: Bowel sounds are normal.      Palpations: Abdomen is soft. Tenderness: There is no abdominal tenderness. There is no guarding or rebound. Musculoskeletal:         General: Normal range of motion. Cervical back: Normal range of motion and neck supple. Skin:     General: Skin is warm and dry. Findings: No rash. Neurological:      Mental Status: He is alert and oriented to person, place, and time.          Vital Signs  ED Triage Vitals [23 0840]   Temperature Pulse Respirations Blood Pressure SpO2   98.1 °F (36.7 °C) 68 20 138/77 98 %      Temp Source Heart Rate Source Patient Position - Orthostatic VS BP Location FiO2 (%)   Temporal Monitor Sitting Right arm --      Pain Score       No Pain           Vitals:    23 0840   BP: 138/77   Pulse: 68 Patient Position - Orthostatic VS: Sitting         Visual Acuity      ED Medications  Medications - No data to display    Diagnostic Studies  Results Reviewed     None                 No orders to display              Procedures  Procedures         ED Course                               SBIRT 22yo+    Flowsheet Row Most Recent Value   Initial Alcohol Screen: US AUDIT-C     1. How often do you have a drink containing alcohol? 0 Filed at: 07/30/2023 0842   2. How many drinks containing alcohol do you have on a typical day you are drinking? 0 Filed at: 07/30/2023 0842   3a. Male UNDER 65: How often do you have five or more drinks on one occasion? 0 Filed at: 07/30/2023 0842   3b. FEMALE Any Age, or MALE 65+: How often do you have 4 or more drinks on one occassion? 0 Filed at: 07/30/2023 0842   Audit-C Score 0 Filed at: 07/30/2023 0105   ANJELICA: How many times in the past year have you. .. Used an illegal drug or used a prescription medication for non-medical reasons? Never Filed at: 07/30/2023 5206                    Medical Decision Making  I discussed the diagnosis of bronchitis with the patient. I discussed the efficacy of using an albuterol inhaler with a spacer. I provided the patient a spacer device and instructed him on the use and increased efficacy of albuterol while using the MDI. The patient was also given a prescription for Tessalon. Patient was given a prescription for Z-Marciano. When I informed the patient that his requested prednisone will be $180 he stated that it was not that much in the past and that it was a blister pack. He then recognized that he was prescribed methylprednisolone in the past.  I informed him that there is no indication for methylprednisolone for his respiratory complaint at this time as he was not having a severe exacerbation and a prolonged course of steroids would not be indicated at this time.   The patient was adamant that I give him the prescription for methylprednisolone. I told him that it was not indicated by based on evidence-based medicine and that he likely did not even need steroids in general however I would consider the use of a burst course of prednisone. The patient refused the prednisone stating that he was concerned that the prednisone would damage his DNA. He was demanding that I give him the methylprednisolone as it was prescribed in the past.  I stated that I would not alter my evidence-based practice patterns based off of his request as there is no indication for prolonged course of steroids that he was requesting. The patient then asked for me to prescribe a higher dose of Zithromax which I also refused to do. She was informed that he had a choice in his healthcare providers and he chose to see me today however I would practice based upon medical indications rather than patient request.    I informed the patient that he was welcome to follow-up with his primary care provider for further management of these conditions. Regarding the growth on his back I placed an ambulatory referral to dermatology and gave him the number for dermatology so that the apparent pedunculated angioma on his back could be best assessed by a specialist despite him wanting me to remove it in the emergency department. Risk  Prescription drug management.           Disposition  Final diagnoses:   Acute bronchitis   Angioma of skin     Time reflects when diagnosis was documented in both MDM as applicable and the Disposition within this note     Time User Action Codes Description Comment    7/30/2023  8:51 AM Hebo Bigger B Add [J20.9] Acute bronchitis     7/30/2023  8:51 AM Hebo Bigger B Add [D18.01] Angioma of skin     7/30/2023  8:51 AM Hebo Bigger B Add [J20.9] Acute bronchitis with wheezing       ED Disposition     ED Disposition   Discharge    Condition   Stable    Date/Time   Sun Jul 30, 2023  8:51 AM    Comment   Stacey Franco discharge to home/self care.               Follow-up Information     Follow up With Specialties Details Why Contact Info Additional [address], DO Internal Medicine Schedule an appointment as soon as possible for a visit in 1 week For further evaluation, if not improved [address] Dermatology [facility] Schedule an appointment as soon as possible for a visit  For further evaluation [address] [facility] [place] [address] [address] [facility] Dermatology [facility], [address], [place], [place], [address], [address] Emergency Department Emergency Medicine Go to  If symptoms worsen [address] [facility] [address] [phone] [address] [facility] Emergency Department, [address] [facility], [address] [facility]          Discharge Medication List as of 7/30/2023  9:26 AM      START taking these medications    Details   azithromycin (ZITHROMAX) 250 mg tablet Take 2 tablets today then 1 tablet daily x 4 days, Normal      benzonatate (TESSALON PERLES) 100 mg capsule Take 1 capsule (100 mg total) by mouth every 8 (eight) hours, Starting Sun 7/30/2023, Normal         CONTINUE these medications which have CHANGED    Details   albuterol (Proventil HFA) 90 mcg/act inhaler Inhale 1-2 puffs every 6 (six) hours as needed for wheezing or shortness of breath, Starting Sun 7/30/2023, Normal         STOP taking these medications       prednisoLONE (ORAPRED ODT) 30 MG disintegrating tablet Comments:   Reason for Stopping:                   PDMP Review     None          ED Provider  Electronically Signed by           Will Venegas DO  07/30/23 0122

## 2023-07-31 ENCOUNTER — HOSPITAL ENCOUNTER (EMERGENCY)
Facility: HOSPITAL | Age: 63
Discharge: HOME | End: 2023-07-31
Attending: EMERGENCY MEDICINE | Admitting: EMERGENCY MEDICINE

## 2023-07-31 VITALS
RESPIRATION RATE: 18 BRPM | BODY MASS INDEX: 33.52 KG/M2 | WEIGHT: 214 LBS | HEART RATE: 67 BPM | TEMPERATURE: 98.1 F | SYSTOLIC BLOOD PRESSURE: 191 MMHG | OXYGEN SATURATION: 98 % | DIASTOLIC BLOOD PRESSURE: 77 MMHG

## 2023-07-31 DIAGNOSIS — J40 BRONCHITIS: Primary | ICD-10-CM

## 2023-07-31 PROCEDURE — 99282 EMERGENCY DEPT VISIT SF MDM: CPT

## 2023-07-31 PROCEDURE — 99284 EMERGENCY DEPT VISIT MOD MDM: CPT | Performed by: PHYSICIAN ASSISTANT

## 2023-07-31 RX ORDER — METHYLPREDNISOLONE 4 MG/1
TABLET ORAL
Qty: 21 TABLET | Refills: 0 | Status: SHIPPED | OUTPATIENT
Start: 2023-07-31 | End: 2023-08-01 | Stop reason: SDUPTHER

## 2023-07-31 NOTE — DISCHARGE INSTRUCTIONS
Follow up with primary care provider of your choice if no improvement next 2-3 days    Your blood pressure was high in the ED and has been elevated on prior ED visits.  You should have this rechecked by primary care provider of your choice this week    Return to ED for fever, worsening shortness of breath/wheezing, worsening symptoms

## 2023-07-31 NOTE — ED PROVIDER NOTES
History  Chief Complaint   Patient presents with   • Medication Problem     Pt to ED from home, states he needs z pack in addition to whatever he was prescribed yesterday. Patient is a 58-year-old white male with history of hypertension who reports 1 1/2  to 2-week history of cough productive of yellow sputum and shortness of breath with mild wheezing. No fever. He is not a smoker. Denies illicit drug use or alcohol. Lives alone. Denies chest pain or abdominal pain. Denies ear pain sore throat or runny nose. Patient was seen in this ED yesterday. He was prescribed Zithromax and benzonatate. He returns today requesting a prescription for methylprednisolone. States this has worked for similar symptoms in the past.  He denies history of diabetes. He has been using his albuterol inhaler at home. Prior to Admission Medications   Prescriptions Last Dose Informant Patient Reported? Taking? albuterol (Proventil HFA) 90 mcg/act inhaler   No No   Sig: Inhale 1-2 puffs every 6 (six) hours as needed for wheezing or shortness of breath   azithromycin (ZITHROMAX) 250 mg tablet   No No   Sig: Take 2 tablets today then 1 tablet daily x 4 days   benzonatate (TESSALON PERLES) 100 mg capsule   No No   Sig: Take 1 capsule (100 mg total) by mouth every 8 (eight) hours      Facility-Administered Medications: None       Past Medical History:   Diagnosis Date   • Deafness in right ear    • Hypertension        Past Surgical History:   Procedure Laterality Date   • SPLENECTOMY, TOTAL         History reviewed. No pertinent family history. I have reviewed and agree with the history as documented.     E-Cigarette/Vaping   • E-Cigarette Use Never User      E-Cigarette/Vaping Substances   • Nicotine No    • THC No    • CBD No    • Flavoring No    • Other No    • Unknown No      Social History     Tobacco Use   • Smoking status: Former     Types: Cigarettes     Quit date: 1987     Years since quittin.8   • Smokeless tobacco: Never   Vaping Use   • Vaping Use: Never used   Substance Use Topics   • Alcohol use: Not Currently   • Drug use: Not Currently       Review of Systems   Constitutional: Negative for chills and fever. HENT: Negative for congestion, ear pain and sore throat. Eyes: Negative for pain and itching. Respiratory: Positive for cough, shortness of breath and wheezing. Cardiovascular: Negative for chest pain and palpitations. Gastrointestinal: Negative for abdominal pain, nausea and vomiting. Genitourinary: Negative for dysuria and hematuria. Musculoskeletal: Negative for arthralgias and back pain. Skin: Negative for color change and rash. Neurological: Negative for syncope and headaches. All other systems reviewed and are negative. Physical Exam  Physical Exam  Vitals and nursing note reviewed. Constitutional:       General: He is not in acute distress. Appearance: Normal appearance. He is not ill-appearing, toxic-appearing or diaphoretic. HENT:      Head: Normocephalic and atraumatic. Right Ear: Tympanic membrane and ear canal normal.      Left Ear: Tympanic membrane, ear canal and external ear normal.      Nose: Nose normal.      Mouth/Throat:      Mouth: Mucous membranes are moist.      Pharynx: Oropharynx is clear. Eyes:      Extraocular Movements: Extraocular movements intact. Conjunctiva/sclera: Conjunctivae normal.      Pupils: Pupils are equal, round, and reactive to light. Cardiovascular:      Rate and Rhythm: Normal rate and regular rhythm. Pulses: Normal pulses. Heart sounds: Normal heart sounds. Pulmonary:      Breath sounds: No stridor. Wheezing present. No rales. Comments: Scant bilateral wheeze  Chest:      Chest wall: No tenderness. Abdominal:      General: Abdomen is flat. Bowel sounds are normal.      Palpations: Abdomen is soft. Musculoskeletal:         General: Normal range of motion.       Cervical back: Normal range of motion and neck supple. Skin:     General: Skin is warm and dry. Comments: Soft tissue mass right shoulder (patient reports this is chronic)   Neurological:      General: No focal deficit present. Mental Status: He is alert and oriented to person, place, and time. Mental status is at baseline. Vital Signs  ED Triage Vitals [07/31/23 0803]   Temperature Pulse Respirations Blood Pressure SpO2   98.1 °F (36.7 °C) 74 20 (!) 205/93 97 %      Temp src Heart Rate Source Patient Position - Orthostatic VS BP Location FiO2 (%)   -- Monitor Lying Left arm --      Pain Score       No Pain           Vitals:    07/31/23 0803 07/31/23 0830   BP: (!) 205/93 (!) 191/77   Pulse: 74 67   Patient Position - Orthostatic VS: Lying Lying         Visual Acuity      ED Medications  Medications - No data to display    Diagnostic Studies  Results Reviewed     None                 No orders to display              Procedures  Procedures         ED Course                               SBIRT 20yo+    Flowsheet Row Most Recent Value   Initial Alcohol Screen: US AUDIT-C     1. How often do you have a drink containing alcohol? 0 Filed at: 07/31/2023 0803   2. How many drinks containing alcohol do you have on a typical day you are drinking? 0 Filed at: 07/31/2023 0803   3a. Male UNDER 65: How often do you have five or more drinks on one occasion? 0 Filed at: 07/31/2023 0803   3b. FEMALE Any Age, or MALE 65+: How often do you have 4 or more drinks on one occassion? 0 Filed at: 07/31/2023 0803   Audit-C Score 0 Filed at: 07/31/2023 1240   ANJELICA: How many times in the past year have you. .. Used an illegal drug or used a prescription medication for non-medical reasons? Never Filed at: 07/31/2023 7523                    Medical Decision Making  Patient is well-appearing and nontoxic. He has scant wheezing on lung exam.  He is oxygenating well with pulse ox 98% on room air. He was noted to be hypertensive in the ED.   States he has never been on medication for this in the past.  Patient was advised he needs follow-up with a primary care provider of his choice for recheck of his blood pressure this week. He was insistent on getting methylprednisolone; states this is worked for similar symptoms in the past.  He was advised of risks of blood pressure elevation on steroid as well. Return precautions given including worsening shortness of breath or wheezing. Risk  Prescription drug management. Disposition  Final diagnoses:   Bronchitis     Time reflects when diagnosis was documented in both MDM as applicable and the Disposition within this note     Time User Action Codes Description Comment    7/31/2023  8:54 AM Lorena Corbett Add [J40] Bronchitis       ED Disposition     ED Disposition   Discharge    Condition   Stable    Date/Time   Mon Jul 31, 2023  8:54 AM    Comment   Lupe Cornell discharge to home/self care. Follow-up Information     Follow up With Specialties Details Why [id number],  Internal Medicine   [address]  [phone]            Patient's Medications   Discharge Prescriptions    METHYLPREDNISOLONE 4 MG TABLET THERAPY PACK    Use as directed on package       Start Date: 7/31/2023 End Date: --       Order Dose: --       Quantity: 21 tablet    Refills: 0       No discharge procedures on file.     PDMP Review     None          ED Provider  Electronically Signed by           Lady Donahue PA-C  07/31/23 0900

## 2023-08-01 ENCOUNTER — HOSPITAL ENCOUNTER (EMERGENCY)
Facility: HOSPITAL | Age: 63
Discharge: HOME | End: 2023-08-01
Attending: EMERGENCY MEDICINE

## 2023-08-01 VITALS
OXYGEN SATURATION: 94 % | TEMPERATURE: 98.1 F | DIASTOLIC BLOOD PRESSURE: 82 MMHG | SYSTOLIC BLOOD PRESSURE: 116 MMHG | HEART RATE: 78 BPM | RESPIRATION RATE: 18 BRPM

## 2023-08-01 DIAGNOSIS — J40 BRONCHITIS: ICD-10-CM

## 2023-08-01 DIAGNOSIS — Z91.198 PATIENT'S NONCOMPLIANCE WITH OTHER MEDICAL TREATMENT AND REGIMEN FOR OTHER REASON: ICD-10-CM

## 2023-08-01 DIAGNOSIS — J20.9 ACUTE BRONCHITIS: Primary | ICD-10-CM

## 2023-08-01 PROCEDURE — 99285 EMERGENCY DEPT VISIT HI MDM: CPT | Performed by: EMERGENCY MEDICINE

## 2023-08-01 PROCEDURE — 99282 EMERGENCY DEPT VISIT SF MDM: CPT

## 2023-08-01 RX ORDER — METHYLPREDNISOLONE 4 MG/1
TABLET ORAL
Qty: 21 TABLET | Refills: 0 | Status: SHIPPED | OUTPATIENT
Start: 2023-08-01

## 2023-08-02 NOTE — ED PROVIDER NOTES
THC No    • CBD No    • Flavoring No    • Other No    • Unknown No      Social History     Tobacco Use   • Smoking status: Former     Types: Cigarettes     Quit date: 1987     Years since quittin.8   • Smokeless tobacco: Never   Vaping Use   • Vaping Use: Never used   Substance Use Topics   • Alcohol use: Not Currently   • Drug use: Not Currently       Review of Systems   Constitutional: Positive for chills and fever. Respiratory: Positive for cough, shortness of breath and wheezing. Cardiovascular: Negative for chest pain, palpitations and leg swelling. Gastrointestinal: Negative for abdominal pain, diarrhea and vomiting. Genitourinary: Negative for dysuria and flank pain. Musculoskeletal: Negative for myalgias. Neurological: Negative for headaches. Physical Exam  Physical Exam  Vitals and nursing note reviewed. Constitutional:       General: He is not in acute distress. Appearance: He is well-developed. He is ill-appearing. He is not diaphoretic. HENT:      Head: Normocephalic and atraumatic. Right Ear: External ear normal.      Left Ear: External ear normal.      Mouth/Throat:      Mouth: Mucous membranes are moist.      Pharynx: Oropharynx is clear. Eyes:      General: No scleral icterus. Conjunctiva/sclera: Conjunctivae normal.      Pupils: Pupils are equal, round, and reactive to light. Neck:      Vascular: No JVD. Cardiovascular:      Rate and Rhythm: Normal rate and regular rhythm. Pulses: Normal pulses. Heart sounds: Normal heart sounds. No murmur heard. Pulmonary:      Effort: Pulmonary effort is normal.      Breath sounds: Wheezing and rhonchi present. No rales. Abdominal:      General: Bowel sounds are normal.      Palpations: Abdomen is soft. There is no mass. Tenderness: There is no abdominal tenderness. There is no guarding or rebound. Musculoskeletal:         General: No tenderness. Normal range of motion.       Cervical back: Normal range of motion and neck supple. Right lower leg: No edema. Left lower leg: No edema. Lymphadenopathy:      Cervical: No cervical adenopathy. Skin:     General: Skin is warm and dry. Capillary Refill: Capillary refill takes less than 2 seconds. Findings: No rash. Neurological:      General: No focal deficit present. Mental Status: He is alert and oriented to person, place, and time. Mental status is at baseline. Cranial Nerves: No cranial nerve deficit. Coordination: Coordination normal.      Deep Tendon Reflexes: Reflexes are normal and symmetric. Psychiatric:         Mood and Affect: Mood normal.         Behavior: Behavior normal.      Comments: Alert and oriented x3 but appears somewhat paranoid. He whispers that he has a problem with the family of his brother's wife. They apparently own the building where  his apartment is and there is some kind of federal case against them. He says that he witnessed something but cannot tell me about it and feels that he is being mistreated there. States that he believes someone in the family came in and took his medication. Denies SI and HI. Declines crisis evaluation. Vital Signs  ED Triage Vitals   Temperature Pulse Respirations Blood Pressure SpO2   08/01/23 2126 08/01/23 2125 08/01/23 2123 08/01/23 2125 08/01/23 2125   98.1 °F (36.7 °C) 78 18 116/82 94 %      Temp src Heart Rate Source Patient Position - Orthostatic VS BP Location FiO2 (%)   -- -- -- -- --             Pain Score       --                  Vitals:    08/01/23 2125   BP: 116/82   Pulse: 78         Visual Acuity      ED Medications  Medications - No data to display    Diagnostic Studies  Results Reviewed     None                 No orders to display              Procedures  Procedures         ED Course                               SBIRT 20yo+    Flowsheet Row Most Recent Value   Initial Alcohol Screen: US AUDIT-C     1.  How often do you have a drink containing alcohol? 0 Filed at: 08/01/2023 2205   2. How many drinks containing alcohol do you have on a typical day you are drinking? 0 Filed at: 08/01/2023 2205   3a. Male UNDER 65: How often do you have five or more drinks on one occasion? 0 Filed at: 08/01/2023 2205   3b. FEMALE Any Age, or MALE 65+: How often do you have 4 or more drinks on one occassion? 0 Filed at: 08/01/2023 2205   Audit-C Score 0 Filed at: 08/01/2023 2205   ANJELICA: How many times in the past year have you. .. Used an illegal drug or used a prescription medication for non-medical reasons? Never Filed at: 08/01/2023 2205                    Medical Decision Making  14-year-old male with acute bronchitis with bronchospasm. He does have productive cough. He has been here several times recently and received antibiotic and steroid. Initially according to notes of several days ago he had minimal wheezing but he does have significant wheezing and scattered rhonchi currently. He does not have increased work of breathing but is frequently coughing and expectorating yellow sputum. He shows me the empty box of Medrol Dosepak. States he only took 1 dose so far. Due to his increased wheezing and bronchospasm I will renew his prescription for the missing medication. I have advised that he follow-up with his PCP who will be better able to follow his condition over the long-term. Acute bronchitis: acute illness or injury  Patient's noncompliance with other medical treatment and regimen for other reason: acute illness or injury  Amount and/or Complexity of Data Reviewed  External Data Reviewed: notes. Risk  Prescription drug management.           Disposition  Final diagnoses:   Acute bronchitis   Patient's noncompliance with other medical treatment and regimen for other reason     Time reflects when diagnosis was documented in both MDM as applicable and the Disposition within this note     Time User Action Codes Description Comment 8/1/2023 10:48 PM Sae GARCIA Add [J20.9] Acute bronchitis     8/1/2023 10:48 PM Sae GARCIA Add [J40] Bronchitis     8/1/2023 10:54 PM Marnie Tavarez Add [Z91.198] Patient's noncompliance with other medical treatment and regimen for other reason       ED Disposition     ED Disposition   Discharge    Condition   Stable    Date/Time   Tue Aug 1, 2023 10:48 PM    Comment   Barbara Westbrook discharge to home/self care. Follow-up Information     Follow up With Specialties Details Why [id number] Krystle Moreno DO Internal Medicine Schedule an appointment as soon as possible for a visit   [address] [place] [address] [facility]            Discharge Medication List as of 8/1/2023 10:55 PM      CONTINUE these medications which have CHANGED    Details   methylPREDNISolone 4 MG tablet therapy pack Use as directed on package, Normal         CONTINUE these medications which have NOT CHANGED    Details   albuterol (Proventil HFA) 90 mcg/act inhaler Inhale 1-2 puffs every 6 (six) hours as needed for wheezing or shortness of breath, Starting Sun 7/30/2023, Normal      azithromycin (ZITHROMAX) 250 mg tablet Take 2 tablets today then 1 tablet daily x 4 days, Normal      benzonatate (TESSALON PERLES) 100 mg capsule Take 1 capsule (100 mg total) by mouth every 8 (eight) hours, Starting Sun 7/30/2023, Normal             No discharge procedures on file.     PDMP Review       Value Time User    PDMP Reviewed  Yes 8/1/2023 10:48 PM Marnie Tavarez DO          ED Provider  Electronically Signed by           Marnie Tavarez DO  08/03/23 [id number]

## 2023-08-02 NOTE — DISCHARGE INSTRUCTIONS
Take medications exactly as prescribed. Keep well-hydrated. Eat a balanced diet. Follow-up with your PCP. Your care would be more consistent by going to your PCP except for emergencies.

## 2024-06-18 ENCOUNTER — APPOINTMENT (EMERGENCY)
Dept: RADIOLOGY | Facility: HOSPITAL | Age: 64
DRG: 607 | End: 2024-06-18
Payer: COMMERCIAL

## 2024-06-18 ENCOUNTER — HOSPITAL ENCOUNTER (INPATIENT)
Facility: HOSPITAL | Age: 64
LOS: 1 days | Discharge: HOME | DRG: 607 | End: 2024-06-20
Attending: EMERGENCY MEDICINE | Admitting: INTERNAL MEDICINE
Payer: COMMERCIAL

## 2024-06-18 DIAGNOSIS — D18.00 ANGIOMA: ICD-10-CM

## 2024-06-18 DIAGNOSIS — J44.1 COPD EXACERBATION (HCC): Primary | ICD-10-CM

## 2024-06-18 DIAGNOSIS — J45.901 REACTIVE AIRWAY DISEASE WITH ACUTE EXACERBATION, UNSPECIFIED ASTHMA SEVERITY, UNSPECIFIED WHETHER PERSISTENT: ICD-10-CM

## 2024-06-18 DIAGNOSIS — J20.9 ACUTE BRONCHITIS WITH WHEEZING: ICD-10-CM

## 2024-06-18 DIAGNOSIS — D23.5: ICD-10-CM

## 2024-06-18 DIAGNOSIS — L71.9 ROSACEA: ICD-10-CM

## 2024-06-18 DIAGNOSIS — J40 BRONCHITIS: ICD-10-CM

## 2024-06-18 DIAGNOSIS — R06.02 SOB (SHORTNESS OF BREATH): ICD-10-CM

## 2024-06-18 PROBLEM — R03.0 ELEVATED BLOOD PRESSURE READING: Status: ACTIVE | Noted: 2024-06-18

## 2024-06-18 LAB
2HR DELTA HS TROPONIN: -1 NG/L
4HR DELTA HS TROPONIN: -2 NG/L
ALBUMIN SERPL BCG-MCNC: 4.7 G/DL (ref 3.5–5)
ALP SERPL-CCNC: 83 U/L (ref 34–104)
ALT SERPL W P-5'-P-CCNC: 27 U/L (ref 7–52)
ANION GAP SERPL CALCULATED.3IONS-SCNC: 9 MMOL/L (ref 4–13)
AST SERPL W P-5'-P-CCNC: 22 U/L (ref 13–39)
ATRIAL RATE: 79 BPM
BASOPHILS # BLD AUTO: 0.07 THOUSANDS/ÂΜL (ref 0–0.1)
BASOPHILS NFR BLD AUTO: 1 % (ref 0–1)
BILIRUB SERPL-MCNC: 0.78 MG/DL (ref 0.2–1)
BNP SERPL-MCNC: 16 PG/ML (ref 0–100)
BUN SERPL-MCNC: 15 MG/DL (ref 5–25)
CALCIUM SERPL-MCNC: 9.2 MG/DL (ref 8.4–10.2)
CARDIAC TROPONIN I PNL SERPL HS: 5 NG/L
CARDIAC TROPONIN I PNL SERPL HS: 6 NG/L
CARDIAC TROPONIN I PNL SERPL HS: 7 NG/L
CHLORIDE SERPL-SCNC: 100 MMOL/L (ref 96–108)
CO2 SERPL-SCNC: 28 MMOL/L (ref 21–32)
CREAT SERPL-MCNC: 0.9 MG/DL (ref 0.6–1.3)
EOSINOPHIL # BLD AUTO: 0.33 THOUSAND/ÂΜL (ref 0–0.61)
EOSINOPHIL NFR BLD AUTO: 3 % (ref 0–6)
ERYTHROCYTE [DISTWIDTH] IN BLOOD BY AUTOMATED COUNT: 12.9 % (ref 11.6–15.1)
FLUAV RNA RESP QL NAA+PROBE: NEGATIVE
FLUBV RNA RESP QL NAA+PROBE: NEGATIVE
GFR SERPL CREATININE-BSD FRML MDRD: 89 ML/MIN/1.73SQ M
GLUCOSE SERPL-MCNC: 130 MG/DL (ref 65–140)
HCT VFR BLD AUTO: 43.1 % (ref 36.5–49.3)
HGB BLD-MCNC: 14.7 G/DL (ref 12–17)
IMM GRANULOCYTES # BLD AUTO: 0.06 THOUSAND/UL (ref 0–0.2)
IMM GRANULOCYTES NFR BLD AUTO: 1 % (ref 0–2)
LYMPHOCYTES # BLD AUTO: 1.85 THOUSANDS/ÂΜL (ref 0.6–4.47)
LYMPHOCYTES NFR BLD AUTO: 18 % (ref 14–44)
MCH RBC QN AUTO: 31.5 PG (ref 26.8–34.3)
MCHC RBC AUTO-ENTMCNC: 34.1 G/DL (ref 31.4–37.4)
MCV RBC AUTO: 93 FL (ref 82–98)
MONOCYTES # BLD AUTO: 1.1 THOUSAND/ÂΜL (ref 0.17–1.22)
MONOCYTES NFR BLD AUTO: 11 % (ref 4–12)
NEUTROPHILS # BLD AUTO: 6.93 THOUSANDS/ÂΜL (ref 1.85–7.62)
NEUTS SEG NFR BLD AUTO: 66 % (ref 43–75)
NRBC BLD AUTO-RTO: 0 /100 WBCS
P AXIS: 86 DEGREES
PLATELET # BLD AUTO: 348 THOUSANDS/UL (ref 149–390)
PMV BLD AUTO: 10.1 FL (ref 8.9–12.7)
POTASSIUM SERPL-SCNC: 3.4 MMOL/L (ref 3.5–5.3)
PR INTERVAL: 172 MS
PROT SERPL-MCNC: 7.3 G/DL (ref 6.4–8.4)
QRS AXIS: 66 DEGREES
QRSD INTERVAL: 74 MS
QT INTERVAL: 408 MS
QTC INTERVAL: 467 MS
RBC # BLD AUTO: 4.66 MILLION/UL (ref 3.88–5.62)
RSV RNA RESP QL NAA+PROBE: NEGATIVE
SARS-COV-2 RNA RESP QL NAA+PROBE: NEGATIVE
SODIUM SERPL-SCNC: 137 MMOL/L (ref 135–147)
T WAVE AXIS: 85 DEGREES
VENTRICULAR RATE: 79 BPM
WBC # BLD AUTO: 10.34 THOUSAND/UL (ref 4.31–10.16)

## 2024-06-18 PROCEDURE — 94640 AIRWAY INHALATION TREATMENT: CPT

## 2024-06-18 PROCEDURE — 84484 ASSAY OF TROPONIN QUANT: CPT

## 2024-06-18 PROCEDURE — 93010 ELECTROCARDIOGRAM REPORT: CPT | Performed by: INTERNAL MEDICINE

## 2024-06-18 PROCEDURE — 94644 CONT INHLJ TX 1ST HOUR: CPT

## 2024-06-18 PROCEDURE — 94760 N-INVAS EAR/PLS OXIMETRY 1: CPT

## 2024-06-18 PROCEDURE — 80053 COMPREHEN METABOLIC PANEL: CPT

## 2024-06-18 PROCEDURE — 99223 1ST HOSP IP/OBS HIGH 75: CPT | Performed by: INTERNAL MEDICINE

## 2024-06-18 PROCEDURE — 36415 COLL VENOUS BLD VENIPUNCTURE: CPT

## 2024-06-18 PROCEDURE — 85025 COMPLETE CBC W/AUTO DIFF WBC: CPT

## 2024-06-18 PROCEDURE — 83880 ASSAY OF NATRIURETIC PEPTIDE: CPT

## 2024-06-18 PROCEDURE — 94664 DEMO&/EVAL PT USE INHALER: CPT

## 2024-06-18 PROCEDURE — 99291 CRITICAL CARE FIRST HOUR: CPT | Performed by: EMERGENCY MEDICINE

## 2024-06-18 PROCEDURE — 99285 EMERGENCY DEPT VISIT HI MDM: CPT

## 2024-06-18 PROCEDURE — 71045 X-RAY EXAM CHEST 1 VIEW: CPT

## 2024-06-18 PROCEDURE — 0241U HB NFCT DS VIR RESP RNA 4 TRGT: CPT

## 2024-06-18 PROCEDURE — 93005 ELECTROCARDIOGRAM TRACING: CPT

## 2024-06-18 PROCEDURE — 96365 THER/PROPH/DIAG IV INF INIT: CPT

## 2024-06-18 RX ORDER — SODIUM CHLORIDE FOR INHALATION 0.9 %
12 VIAL, NEBULIZER (ML) INHALATION ONCE
Status: COMPLETED | OUTPATIENT
Start: 2024-06-18 | End: 2024-06-18

## 2024-06-18 RX ORDER — AZITHROMYCIN 500 MG/1
500 TABLET, FILM COATED ORAL EVERY 24 HOURS
Status: DISCONTINUED | OUTPATIENT
Start: 2024-06-19 | End: 2024-06-18

## 2024-06-18 RX ORDER — ENOXAPARIN SODIUM 100 MG/ML
40 INJECTION SUBCUTANEOUS DAILY
Status: DISCONTINUED | OUTPATIENT
Start: 2024-06-18 | End: 2024-06-20 | Stop reason: HOSPADM

## 2024-06-18 RX ORDER — LEVALBUTEROL INHALATION SOLUTION 1.25 MG/3ML
1.25 SOLUTION RESPIRATORY (INHALATION)
Status: DISCONTINUED | OUTPATIENT
Start: 2024-06-18 | End: 2024-06-20 | Stop reason: HOSPADM

## 2024-06-18 RX ORDER — SODIUM CHLORIDE FOR INHALATION 0.9 %
3 VIAL, NEBULIZER (ML) INHALATION
Status: DISCONTINUED | OUTPATIENT
Start: 2024-06-18 | End: 2024-06-18

## 2024-06-18 RX ORDER — GUAIFENESIN 600 MG/1
600 TABLET, EXTENDED RELEASE ORAL 2 TIMES DAILY
Status: DISCONTINUED | OUTPATIENT
Start: 2024-06-18 | End: 2024-06-20 | Stop reason: HOSPADM

## 2024-06-18 RX ORDER — ALBUTEROL SULFATE 90 UG/1
1 AEROSOL, METERED RESPIRATORY (INHALATION) EVERY 6 HOURS PRN
Status: DISCONTINUED | OUTPATIENT
Start: 2024-06-18 | End: 2024-06-20 | Stop reason: HOSPADM

## 2024-06-18 RX ORDER — PREDNISONE 20 MG/1
40 TABLET ORAL DAILY
Status: DISCONTINUED | OUTPATIENT
Start: 2024-06-19 | End: 2024-06-20 | Stop reason: HOSPADM

## 2024-06-18 RX ORDER — MAGNESIUM SULFATE HEPTAHYDRATE 40 MG/ML
2 INJECTION, SOLUTION INTRAVENOUS ONCE
Status: COMPLETED | OUTPATIENT
Start: 2024-06-18 | End: 2024-06-18

## 2024-06-18 RX ORDER — POTASSIUM CHLORIDE 20 MEQ/1
40 TABLET, EXTENDED RELEASE ORAL ONCE
Status: COMPLETED | OUTPATIENT
Start: 2024-06-18 | End: 2024-06-18

## 2024-06-18 RX ORDER — ALBUTEROL SULFATE 2.5 MG/3ML
5 SOLUTION RESPIRATORY (INHALATION) ONCE
Status: COMPLETED | OUTPATIENT
Start: 2024-06-18 | End: 2024-06-18

## 2024-06-18 RX ORDER — AZITHROMYCIN 500 MG/1
500 TABLET, FILM COATED ORAL EVERY 24 HOURS
Status: DISCONTINUED | OUTPATIENT
Start: 2024-06-19 | End: 2024-06-20 | Stop reason: HOSPADM

## 2024-06-18 RX ADMIN — ISODIUM CHLORIDE 12 ML: 0.03 SOLUTION RESPIRATORY (INHALATION) at 11:18

## 2024-06-18 RX ADMIN — IPRATROPIUM BROMIDE 1 MG: 0.5 SOLUTION RESPIRATORY (INHALATION) at 11:18

## 2024-06-18 RX ADMIN — GUAIFENESIN 600 MG: 600 TABLET ORAL at 17:09

## 2024-06-18 RX ADMIN — IPRATROPIUM BROMIDE 0.5 MG: 0.5 SOLUTION RESPIRATORY (INHALATION) at 09:56

## 2024-06-18 RX ADMIN — LEVALBUTEROL HYDROCHLORIDE 1.25 MG: 1.25 SOLUTION RESPIRATORY (INHALATION) at 19:52

## 2024-06-18 RX ADMIN — ALBUTEROL SULFATE 5 MG: 2.5 SOLUTION RESPIRATORY (INHALATION) at 09:56

## 2024-06-18 RX ADMIN — ALBUTEROL SULFATE 10 MG: 2.5 SOLUTION RESPIRATORY (INHALATION) at 11:18

## 2024-06-18 RX ADMIN — POTASSIUM CHLORIDE 40 MEQ: 1500 TABLET, EXTENDED RELEASE ORAL at 11:06

## 2024-06-18 RX ADMIN — AZITHROMYCIN MONOHYDRATE 500 MG: 500 INJECTION, POWDER, LYOPHILIZED, FOR SOLUTION INTRAVENOUS at 13:04

## 2024-06-18 RX ADMIN — IPRATROPIUM BROMIDE 0.5 MG: 0.5 SOLUTION RESPIRATORY (INHALATION) at 19:52

## 2024-06-18 RX ADMIN — IPRATROPIUM BROMIDE 0.5 MG: 0.5 SOLUTION RESPIRATORY (INHALATION) at 14:51

## 2024-06-18 RX ADMIN — PREDNISONE 50 MG: 10 TABLET ORAL at 09:54

## 2024-06-18 RX ADMIN — MAGNESIUM SULFATE HEPTAHYDRATE 2 G: 2 INJECTION, SOLUTION INTRAVENOUS at 11:07

## 2024-06-18 RX ADMIN — LEVALBUTEROL HYDROCHLORIDE 1.25 MG: 1.25 SOLUTION RESPIRATORY (INHALATION) at 14:51

## 2024-06-18 NOTE — ASSESSMENT & PLAN NOTE
Per chart review patient has history of hypertension however patient stated he does not have hypertension  Not on any blood pressure medication  Monitor for now.  If persistent elevated, will discuss about starting blood pressure meds tomorrow

## 2024-06-18 NOTE — ED PROVIDER NOTES
"History  Chief Complaint   Patient presents with    Shortness of Breath     SOB x 2 weeks. Coughing up phlegm. Denies CP. Also c/o having lesion on back. Pt states he wants a refill on azithromycin and prednisone.     Patient is a 63-year-old male past medical history of hypertension, schizophrenia arriving for evaluation of shortness of breath has been ongoing for 10 days to 2 weeks.  Patient states he has been coughing up yellow phlegm.  Patient states that he was evaluated given a prescription for an inhaler that he could not afford.  Patient denies any fevers, chills.  Patient reports difficulty with inspiration but denies any chest pain.  Patient states that this is occurred in the past and had a diagnosis of bronchitis.  Patient denies any abdominal pain, nausea, vomiting. Patient reports he's had \"something on my back since 2011.\" Patient states he has no complaints regarding it, just wanted it, \"Looked at.\" There is no erythema, no swelling around it, no signs of infection.         Prior to Admission Medications   Prescriptions Last Dose Informant Patient Reported? Taking?   albuterol (Proventil HFA) 90 mcg/act inhaler Unknown  No No   Sig: Inhale 1-2 puffs every 6 (six) hours as needed for wheezing or shortness of breath   benzonatate (TESSALON PERLES) 100 mg capsule Not Taking  No No   Sig: Take 1 capsule (100 mg total) by mouth every 8 (eight) hours   Patient not taking: Reported on 6/18/2024   methylPREDNISolone 4 MG tablet therapy pack Unknown  No No   Sig: Use as directed on package      Facility-Administered Medications: None       Past Medical History:   Diagnosis Date    Deafness in right ear     Hypertension        Past Surgical History:   Procedure Laterality Date    SPLENECTOMY, TOTAL         History reviewed. No pertinent family history.  I have reviewed and agree with the history as documented.    E-Cigarette/Vaping    E-Cigarette Use Never User      E-Cigarette/Vaping Substances    Nicotine No "     THC No     CBD No     Flavoring No     Other No     Unknown No      Social History     Tobacco Use    Smoking status: Former     Current packs/day: 0.00     Types: Cigarettes     Quit date: 1987     Years since quittin.7    Smokeless tobacco: Never   Vaping Use    Vaping status: Never Used   Substance Use Topics    Alcohol use: Not Currently    Drug use: Not Currently       Review of Systems   Constitutional: Negative.    HENT: Negative.     Eyes: Negative.    Respiratory:  Positive for cough and shortness of breath. Negative for chest tightness.    Cardiovascular:  Negative for chest pain, palpitations and leg swelling.   Gastrointestinal: Negative.  Negative for abdominal pain.   Endocrine: Negative.    Genitourinary: Negative.    Musculoskeletal: Negative.    Skin: Negative.    Allergic/Immunologic: Negative.    Neurological: Negative.    Hematological: Negative.    Psychiatric/Behavioral: Negative.     All other systems reviewed and are negative.      Physical Exam  Physical Exam  Vitals and nursing note reviewed.   Constitutional:       Appearance: He is well-developed and normal weight.   HENT:      Head: Normocephalic.      Mouth/Throat:      Mouth: Mucous membranes are moist.   Eyes:      Pupils: Pupils are equal, round, and reactive to light.   Cardiovascular:      Rate and Rhythm: Normal rate and regular rhythm.   Pulmonary:      Effort: Pulmonary effort is normal. No accessory muscle usage or respiratory distress.      Breath sounds: Examination of the right-upper field reveals decreased breath sounds and wheezing. Examination of the left-upper field reveals decreased breath sounds and wheezing. Examination of the right-middle field reveals decreased breath sounds and wheezing. Examination of the left-middle field reveals decreased breath sounds and wheezing. Examination of the right-lower field reveals decreased breath sounds and wheezing. Examination of the left-lower field reveals  decreased breath sounds and wheezing. Decreased breath sounds and wheezing present. No rhonchi or rales.      Comments: Pursed lip breathing   Abdominal:      General: Bowel sounds are normal.      Palpations: Abdomen is soft.   Musculoskeletal:         General: Normal range of motion.      Cervical back: Normal range of motion and neck supple.      Right lower leg: No tenderness. No edema.      Left lower leg: No tenderness. No edema.   Skin:     General: Skin is warm.      Capillary Refill: Capillary refill takes less than 2 seconds.          Neurological:      General: No focal deficit present.      Mental Status: He is alert and oriented to person, place, and time.   Psychiatric:         Mood and Affect: Mood normal.         Behavior: Behavior normal.         Vital Signs  ED Triage Vitals [06/18/24 0931]   Temperature Pulse Respirations Blood Pressure SpO2   97.9 °F (36.6 °C) 83 22 (!) 194/82 94 %      Temp Source Heart Rate Source Patient Position - Orthostatic VS BP Location FiO2 (%)   Oral Monitor Sitting Right arm --      Pain Score       No Pain           Vitals:    06/18/24 1309 06/18/24 1344 06/18/24 2000 06/19/24 0702   BP: 140/66 (!) 169/103 152/95 166/89   Pulse: 88 90  68   Patient Position - Orthostatic VS: Sitting  Lying          Visual Acuity      ED Medications  Medications   albuterol (PROVENTIL HFA,VENTOLIN HFA) inhaler 1 puff (has no administration in time range)   enoxaparin (LOVENOX) subcutaneous injection 40 mg (40 mg Subcutaneous Not Given 6/19/24 0819)   guaiFENesin (MUCINEX) 12 hr tablet 600 mg (600 mg Oral Not Given 6/19/24 0821)   levalbuterol (XOPENEX) inhalation solution 1.25 mg (1.25 mg Nebulization Given 6/19/24 0727)   ipratropium (ATROVENT) 0.02 % inhalation solution 0.5 mg (0.5 mg Nebulization Given 6/19/24 0727)   predniSONE tablet 40 mg (40 mg Oral Given 6/19/24 0820)   azithromycin (ZITHROMAX) tablet 500 mg (500 mg Oral Given 6/19/24 0820)   albuterol inhalation solution 5 mg  (5 mg Nebulization Given 6/18/24 0956)   ipratropium (ATROVENT) 0.02 % inhalation solution 0.5 mg (0.5 mg Nebulization Given 6/18/24 0956)   predniSONE tablet 50 mg (50 mg Oral Given 6/18/24 0954)   albuterol inhalation solution 10 mg (10 mg Nebulization Given 6/18/24 1118)   ipratropium (ATROVENT) 0.02 % inhalation solution 1 mg (1 mg Nebulization Given 6/18/24 1118)   sodium chloride 0.9 % inhalation solution 12 mL (12 mL Nebulization Given 6/18/24 1118)   magnesium sulfate 2 g/50 mL IVPB (premix) 2 g (0 g Intravenous Stopped 6/18/24 1207)   potassium chloride (Klor-Con M20) CR tablet 40 mEq (40 mEq Oral Given 6/18/24 1106)   azithromycin (ZITHROMAX) 500 mg in sodium chloride 0.9% 250mL IVPB 500 mg (500 mg Intravenous New Bag 6/18/24 1304)       Diagnostic Studies  Results Reviewed       Procedure Component Value Units Date/Time    HS Troponin I 4hr [[id number]]  (Normal) Collected: 06/18/24 1302    Lab Status: Final result Specimen: Blood from Arm, Left Updated: 06/18/24 1337     hs TnI 4hr 5 ng/L      Delta 4hr hsTnI -2 ng/L     HS Troponin I 2hr [[id number]]  (Normal) Collected: 06/18/24 1113    Lab Status: Final result Specimen: Blood from Arm, Left Updated: 06/18/24 1139     hs TnI 2hr 6 ng/L      Delta 2hr hsTnI -1 ng/L     B-Type Natriuretic Peptide(BNP) [[id number]]  (Normal) Collected: 06/18/24 0945    Lab Status: Final result Specimen: Blood from Arm, Left Updated: 06/18/24 1058     BNP 16 pg/mL     FLU/RSV/COVID - if FLU/RSV clinically relevant [[id number]]  (Normal) Collected: 06/18/24 0945    Lab Status: Final result Specimen: Nares from Nose Updated: 06/18/24 1041     SARS-CoV-2 Negative     INFLUENZA A PCR Negative     INFLUENZA B PCR Negative     RSV PCR Negative    Narrative:      FOR PEDIATRIC PATIENTS - copy/paste COVID Guidelines URL to browser: [email or web address]    SARS-CoV-2 assay is a Nucleic Acid Amplification assay intended for  the  qualitative detection of nucleic acid from SARS-CoV-2 in nasopharyngeal  swabs. Results are for the presumptive identification of SARS-CoV-2 RNA.    Positive results are indicative of infection with SARS-CoV-2, the virus  causing COVID-19, but do not rule out bacterial infection or co-infection  with other viruses. Laboratories within the [place] and its  territories are required to report all positive results to the appropriate  public health authorities. Negative results do not preclude SARS-CoV-2  infection and should not be used as the sole basis for treatment or other  patient management decisions. Negative results must be combined with  clinical observations, patient history, and epidemiological information.  This test has not been FDA cleared or approved.    This test has been authorized by FDA under an Emergency Use Authorization  (EUA). This test is only authorized for the duration of time the  declaration that circumstances exist justifying the authorization of the  emergency use of an in vitro diagnostic tests for detection of SARS-CoV-2  virus and/or diagnosis of COVID-19 infection under section 564(b)(1) of  the Act, 21 U.S.C. 360bbb-3(b)(1), unless the authorization is terminated  or revoked sooner. The test has been validated but independent review by FDA  and CLIA is pending.    Test performed using [employer] GeneXpert: This RT-PCR assay targets N2,  a region unique to SARS-CoV-2. A conserved region in the E-gene was chosen  for pan-Sarbecovirus detection which includes SARS-CoV-2.    According to CMS-2020-01-R, this platform meets the definition of high-throughput technology.    Comprehensive metabolic panel [[id number]]  (Abnormal) Collected: 06/18/24 0945    Lab Status: Final result Specimen: Blood from Arm, Left Updated: 06/18/24 1020     Sodium 137 mmol/L      Potassium 3.4 mmol/L      Chloride 100 mmol/L      CO2 28 mmol/L      ANION GAP 9 mmol/L      BUN 15 mg/dL      Creatinine 0.90  mg/dL      Glucose 130 mg/dL      Calcium 9.2 mg/dL      AST 22 U/L      ALT 27 U/L      Alkaline Phosphatase 83 U/L      Total Protein 7.3 g/dL      Albumin 4.7 g/dL      Total Bilirubin 0.78 mg/dL      eGFR 89 ml/min/1.73sq m     Narrative:      National Kidney Disease Foundation guidelines for Chronic Kidney Disease (CKD):     Stage 1 with normal or high GFR (GFR > 90 mL/min/1.73 square meters)    Stage 2 Mild CKD (GFR = 60-89 mL/min/1.73 square meters)    Stage 3A Moderate CKD (GFR = 45-59 mL/min/1.73 square meters)    Stage 3B Moderate CKD (GFR = 30-44 mL/min/1.73 square meters)    Stage 4 Severe CKD (GFR = 15-29 mL/min/1.73 square meters)    Stage 5 End Stage CKD (GFR <15 mL/min/1.73 square meters)  Note: GFR calculation is accurate only with a steady state creatinine    HS Troponin 0hr (reflex protocol) [[id number]]  (Normal) Collected: 06/18/24 0945    Lab Status: Final result Specimen: Blood from Arm, Left Updated: 06/18/24 1015     hs TnI 0hr 7 ng/L     CBC and differential [[id number]]  (Abnormal) Collected: 06/18/24 0945    Lab Status: Final result Specimen: Blood from Arm, Left Updated: 06/18/24 0955     WBC 10.34 Thousand/uL      RBC 4.66 Million/uL      Hemoglobin 14.7 g/dL      Hematocrit 43.1 %      MCV 93 fL      MCH 31.5 pg      MCHC 34.1 g/dL      RDW 12.9 %      MPV 10.1 fL      Platelets 348 Thousands/uL      nRBC 0 /100 WBCs      Segmented % 66 %      Immature Grans % 1 %      Lymphocytes % 18 %      Monocytes % 11 %      Eosinophils Relative 3 %      Basophils Relative 1 %      Absolute Neutrophils 6.93 Thousands/µL      Absolute Immature Grans 0.06 Thousand/uL      Absolute Lymphocytes 1.85 Thousands/µL      Absolute Monocytes 1.10 Thousand/µL      Eosinophils Absolute 0.33 Thousand/µL      Basophils Absolute 0.07 Thousands/µL                    XR chest 1 view portable   Final Result by Felicity Daniel MD (06/18 1045)      No acute cardiopulmonary disease.            Workstation  performed: [id number]                    Procedures  ECG 12 Lead Documentation Only    Date/Time: 6/18/2024 9:35 AM    Performed by: BRENTON Nunez  Authorized by: BRENTON Nunez    Indications / Diagnosis:  Shortness of breath  ECG reviewed by me, the ED Provider: yes    Patient location:  ED  Interpretation:     Interpretation: normal    Rate:     ECG rate:  79    ECG rate assessment: normal    Rhythm:     Rhythm: sinus rhythm    Ectopy:     Ectopy: none    QRS:     QRS axis:  Normal  Conduction:     Conduction: normal    ST segments:     ST segments:  Normal  T waves:     T waves: normal    CriticalCare Time    Date/Time: 6/18/2024 10:50 AM    Performed by: BRENTON Nunez  Authorized by: BRENTON Nunez    Critical care provider statement:     Critical care time (minutes):  33    Critical care time was exclusive of:  Separately billable procedures and treating other patients    Critical care was necessary to treat or prevent imminent or life-threatening deterioration of the following conditions:  Cardiac failure, respiratory failure and metabolic crisis    Critical care was time spent personally by me on the following activities:  Blood draw for specimens, obtaining history from patient or surrogate, development of treatment plan with patient or surrogate, evaluation of patient's response to treatment, examination of patient, interpretation of cardiac output measurements, ordering and performing treatments and interventions, ordering and review of laboratory studies, ordering and review of radiographic studies, re-evaluation of patient's condition and review of old charts           ED Course  ED Course as of 06/19/24 0830   Tue Jun 18, 2024   0959 WBC(!): 10.34   1049 Reassessment of patient following breathing treatment shows continued pursed lip breathing. Lung sounds are more prominent but still having coarse expiratory wheezes. No hypoxia. Will replete potassium, and provide  hour long treatment.    1051 FLU/RSV/COVID - if FLU/RSV clinically relevant   1149 Delta 2hr hsTnI: -1             HEART Risk Score      Flowsheet Row Most Recent Value   Heart Score Risk Calculator    History 0 Filed at: 06/18/2024 1300   ECG 0 Filed at: 06/18/2024 1300   Age 1 Filed at: 06/18/2024 1300   Risk Factors 1 Filed at: 06/18/2024 1300   Troponin 0 Filed at: 06/18/2024 1300   HEART Score 2 Filed at: 06/18/2024 1300                          SBIRT 22yo+      Flowsheet Row Most Recent Value   Initial Alcohol Screen: US AUDIT-C     1. How often do you have a drink containing alcohol? 0 Filed at: 06/18/2024 0933   2. How many drinks containing alcohol do you have on a typical day you are drinking?  0 Filed at: 06/18/2024 0933   3a. Male UNDER 65: How often do you have five or more drinks on one occasion? 0 Filed at: 06/18/2024 0933   Audit-C Score 0 Filed at: 06/18/2024 0933   ANJELICA: How many times in the past year have you...    Used an illegal drug or used a prescription medication for non-medical reasons? Never Filed at: 06/18/2024 0933                      Medical Decision Making  DDx: pneumonia, covid, bronchitis   Patient presenting with reports of productive cough and shortness of breath for the past 10 days. Patient does not have an inhaler at home, and could not afford what inhaler prescribed 10 days ago. Will check shortness of breath work up, provide breathing treatment, and steroids.   No acute findings on xray. Mild leukocytosis present on CBC. No anemia. No SIRs criteria to suggest sepsis. Patient has No MINGO. No electrolyte abnormality. LFT WNL.  Troponin with negative delta. Heart score of 2. SOB related to acute exacerbation of COPD. Patient reports was a former smoker. Patient does not have a regular pulmonologist, or PCP. Patient has mental health history of schizophrenia, denies SI/HI. Patient acting appropriately but is cautious regarding medications consistent with prior exam notes. Patient  reassessed following his CHRIS neb, mg, and steroids with very mild improvement, with continues pursed lip breathing, and diffuse expiratory wheezes, although less diminished. Given two breathing treatments, with continued SOB and wheezes will treat as exacerbation secondary to bronchitis. Patient provided with azithromycin. Patient accepting of admission. Discussed with Dr. Louise and patient accepted.     Amount and/or Complexity of Data Reviewed  Labs: ordered. Decision-making details documented in ED Course.    Risk  Prescription drug management.  Decision regarding hospitalization.             Disposition  Final diagnoses:   COPD exacerbation (HCC)   Bronchitis   SOB (shortness of breath)   Angioma     Time reflects when diagnosis was documented in both MDM as applicable and the Disposition within this note       Time User Action Codes Description Comment    6/18/2024 12:58 PM Miri Rodriguez [J44.1] COPD exacerbation (HCC)     6/18/2024 12:58 PM Miri Rodriguez [J40] Bronchitis     6/18/2024 12:58 PM Miri Rodriguez [R06.02] SOB (shortness of breath)     6/18/2024 12:58 PM Miri Rodriguez [D18.00] Angioma     6/18/2024  2:34 PM Dianne Louise Add [J45.901] Reactive airway disease with acute exacerbation, unspecified asthma severity, unspecified whether persistent           ED Disposition       ED Disposition   Admit    Condition   Stable    Date/Time   Tue Jun 18, 2024 6299    Comment   Case was discussed with Dani and the patient's admission status was agreed to be Admission Status: observation status to the service of Dr. Louise .               Follow-up Information    None         Current Discharge Medication List        CONTINUE these medications which have NOT CHANGED    Details   albuterol (Proventil HFA) 90 mcg/act inhaler Inhale 1-2 puffs every 6 (six) hours as needed for wheezing or shortness of breath  Qty: 18 g, Refills: 0    Comments:  Substitution to a formulary equivalent within the same pharmaceutical class is authorized.  Associated Diagnoses: Acute bronchitis with wheezing      benzonatate (TESSALON PERLES) 100 mg capsule Take 1 capsule (100 mg total) by mouth every 8 (eight) hours  Qty: 21 capsule, Refills: 0    Associated Diagnoses: Acute bronchitis      methylPREDNISolone 4 MG tablet therapy pack Use as directed on package  Qty: 21 tablet, Refills: 0    Associated Diagnoses: Bronchitis             No discharge procedures on file.    PDMP Review         Value Time User    PDMP Reviewed  Yes 8/1/2023 10:48 PM Harvey Aguiar DO            ED Provider  Electronically Signed by             BRENTON Nunez  06/19/24 0800

## 2024-06-18 NOTE — RESPIRATORY THERAPY NOTE
"RT Protocol Note  Albert Jensen 63 y.o. male MRN: [id number]  Unit/Bed#: -01 Encounter: [id number]    Assessment    Principal Problem:    Shortness of breath      Home Pulmonary Medications:  Proventil hfa       Past Medical History:   Diagnosis Date    Deafness in right ear     Hypertension      Social History     Socioeconomic History    Marital status: Single     Spouse name: None    Number of children: None    Years of education: None    Highest education level: None   Occupational History    None   Tobacco Use    Smoking status: Former     Current packs/day: 0.00     Types: Cigarettes     Quit date: 1987     Years since quittin.7    Smokeless tobacco: Never   Vaping Use    Vaping status: Never Used   Substance and Sexual Activity    Alcohol use: Not Currently    Drug use: Not Currently    Sexual activity: None   Other Topics Concern    None   Social History Narrative    None     Social Determinants of Health     Financial Resource Strain: Not on file   Food Insecurity: Not on file   Transportation Needs: Not on file   Physical Activity: Not on file   Stress: Not on file   Social Connections: Not on file   Intimate Partner Violence: Not on file   Housing Stability: Not on file       Subjective         Objective    Physical Exam:   Assessment Type: (P) Assess only  General Appearance: (P) Awake, Alert  Respiratory Pattern: (P) Normal  Chest Assessment: (P) Chest expansion symmetrical  Bilateral Breath Sounds: (P) Diminished  Cough: Productive  O2 Device: (P) RA    Vitals:  Blood pressure (!) 169/103, pulse 90, temperature 97.9 °F (36.6 °C), resp. rate 22, height 5' 7\" (1.702 m), weight 95.3 kg (210 lb), SpO2 95%.          Imaging and other studies: I have personally reviewed pertinent reports.      O2 Device: (P) RA     Plan    Respiratory Plan: (P) Mild Distress pathway        Resp Comments: (P) physician ordered neb tx's TID will continue current treatment plan   "

## 2024-06-18 NOTE — H&P
[facility]  H&P  Name: Albert Jensen 63 y.o. male I MRN: [id number]  Unit/Bed#: -01 I Date of Admission: 6/18/2024   Date of Service: 6/18/2024 I Hospital Day: 0      Assessment & Plan   * Shortness of breath  Assessment & Plan  Presented with shortness of breath, productive cough for about a week and a half.  Was seen in urgent care a week ago and was started on steroids, antibiotics and albuterol as needed.  Patient reported he did not take steroids because they were expensive.  He took his albuterol as needed and the antibiotics however did not have much improvement.  On presentation patient had stable vital signs, at room air.  Chest x-ray normal however patient with wheezing on physical exam.  BNP normal, troponin is normal.  COVID/flu/RSV normal  Patient does not have history of lung disease however he had similar presentations in the past and was treated with steroids, antibiotics and discharged.  Patient did not follow with providers outpatient.  Patient denies current smoking, reported he smoked for a very short period of time when he was young and he was passive smoker in his childhood.  Unclear if the patient has any underlying lung disease however his presentation is similar with a COPD exacerbation  Patient would prefer oral steroids and not IV  Emergency department received DuoNebs and prednisone 50 mg orally.    Plan:  Start prednisone 40 mg daily tomorrow  DuoNebs  Albuterol as needed  Azithromycin 500 mg x 3 days  Mucinex  Will consult pulmonology as the patient had multiple similar presentations in the past and will need long-term management and outpatient follow-up    Elevated blood pressure reading  Assessment & Plan  Per chart review patient has history of hypertension however patient stated he does not have hypertension  Not on any blood pressure medication  Monitor for now.  If persistent elevated, will discuss about starting blood pressure meds  tomorrow    Leukocytosis  Assessment & Plan  Recent Labs     06/18/24  0945   WBC 10.34*     Mild leukocytosis  Does not meet sepsis criteria.  Monitor    Schizophrenia (HCC)  Assessment & Plan  Per chart review  Patient currently stable, not on any medication, does not follow with providers         VTE Pharmacologic Prophylaxis: VTE Score: 3 Moderate Risk (Score 3-4) - Pharmacological DVT Prophylaxis Ordered: enoxaparin (Lovenox).  Code Status: Level 1 - Full Code   Discussion with family: Updated  (sister) at bedside.    Anticipated Length of Stay: Patient will be admitted on an observation basis with an anticipated length of stay of less than 2 midnights secondary to sob.    Total Time Spent on Date of Encounter in care of patient: 75 mins. This time was spent on one or more of the following: performing physical exam; counseling and coordination of care; obtaining or reviewing history; documenting in the medical record; reviewing/ordering tests, medications or procedures; communicating with other healthcare professionals and discussing with patient's family/caregivers.    Chief Complaint: shortness of breath , cough     History of Present Illness:  Albert Jensen is a 63 y.o. male with a PMH of schizophrenia,  who presents with productive cough, shortness of breath in the past week and a half or so and he was seen in urgent care it was prescribed steroid, antibiotics and albuterol however he was not able to take the steroids because they were expensive.  He did not have improvement with albuterol and antibiotics so he presented to emergency department today.  On presentation patient with wheezing, stable vital signs, x-ray normal.    He was admitted for further management.    Review of Systems:  Review of Systems   Constitutional:  Negative for chills and fever.   Respiratory:  Positive for cough and shortness of breath. Negative for chest tightness.    Cardiovascular:  Negative for chest pain.  "  Gastrointestinal:  Negative for abdominal pain.   All other systems reviewed and are negative.      Past Medical and Surgical History:   Past Medical History:   Diagnosis Date    Deafness in right ear     Hypertension        Past Surgical History:   Procedure Laterality Date    SPLENECTOMY, TOTAL         Meds/Allergies:  Prior to Admission medications    Medication Sig Start Date End Date Taking? Authorizing Provider   albuterol (Proventil HFA) 90 mcg/act inhaler Inhale 1-2 puffs every 6 (six) hours as needed for wheezing or shortness of breath 23   Darryn Simmons DO   benzonatate (TESSALON PERLES) 100 mg capsule Take 1 capsule (100 mg total) by mouth every 8 (eight) hours  Patient not taking: Reported on 2024   Darryn Simmons DO   methylPREDNISolone 4 MG tablet therapy pack Use as directed on package 23   Harvey Aguiar DO     I have reviewed home medications with patient personally.    Allergies: No Known Allergies    Social History:  Marital Status: Single   Occupation: not working   Patient Pre-hospital Living Situation: Alone  Patient Pre-hospital Level of Mobility: walks  Patient Pre-hospital Diet Restrictions: none  Substance Use History:   Social History     Substance and Sexual Activity   Alcohol Use Not Currently     Social History     Tobacco Use   Smoking Status Former    Current packs/day: 0.00    Types: Cigarettes    Quit date: 1987    Years since quittin.7   Smokeless Tobacco Never     Social History     Substance and Sexual Activity   Drug Use Not Currently       Family History:  History reviewed. No pertinent family history.    Physical Exam:     Vitals:   Blood Pressure: (!) 169/103 (24 1344)  Pulse: 90 (24 1344)  Temperature: 97.9 °F (36.6 °C) (24 1344)  Temp Source: Oral (24 0931)  Respirations: 22 (24 1309)  Height: 5' 7\" (170.2 cm) (24 0931)  Weight - Scale: 95.3 kg (210 lb) (24 0931)  SpO2: 95 % (24 " "1344)    Physical Exam  Vitals and nursing note reviewed.   Constitutional:       General: He is not in acute distress.     Appearance: He is not diaphoretic.   HENT:      Head: Normocephalic.   Eyes:      General: No scleral icterus.        Right eye: No discharge.         Left eye: No discharge.   Cardiovascular:      Rate and Rhythm: Normal rate and regular rhythm.   Pulmonary:      Effort: Pulmonary effort is normal. No respiratory distress.      Breath sounds: Wheezing present. No rhonchi or rales.   Abdominal:      General: There is no distension.      Palpations: Abdomen is soft.      Tenderness: There is no abdominal tenderness. There is no guarding or rebound.   Musculoskeletal:      Cervical back: Normal range of motion.      Right lower leg: No edema.      Left lower leg: No edema.   Skin:     General: Skin is warm.   Neurological:      Mental Status: He is alert and oriented to person, place, and time.   Psychiatric:         Mood and Affect: Mood normal.         Behavior: Behavior normal.         Thought Content: Thought content normal.         Judgment: Judgment normal.          Additional Data:     Lab Results:  Results from last 7 days   Lab Units 06/18/24  0945   WBC Thousand/uL 10.34*   HEMOGLOBIN g/dL 14.7   HEMATOCRIT % 43.1   PLATELETS Thousands/uL 348   SEGS PCT % 66   LYMPHO PCT % 18   MONO PCT % 11   EOS PCT % 3     Results from last 7 days   Lab Units 06/18/24  0945   SODIUM mmol/L 137   POTASSIUM mmol/L 3.4*   CHLORIDE mmol/L 100   CO2 mmol/L 28   BUN mg/dL 15   CREATININE mg/dL 0.90   ANION GAP mmol/L 9   CALCIUM mg/dL 9.2   ALBUMIN g/dL 4.7   TOTAL BILIRUBIN mg/dL 0.78   ALK PHOS U/L 83   ALT U/L 27   AST U/L 22   GLUCOSE RANDOM mg/dL 130             No results found for: \"HGBA1C\"        Lines/Drains:  Invasive Devices       Peripheral Intravenous Line  Duration             Peripheral IV 06/18/24 Left Antecubital <1 day                        Imaging: Reviewed radiology reports from this " admission including: chest xray  XR chest 1 view portable   Final Result by Felicity Daniel MD (06/18 4994)      No acute cardiopulmonary disease.            Workstation performed: [id number]             EKG and Other Studies Reviewed on Admission:   EKG: NSR. HR 79.    ** Please Note: This note has been constructed using a voice recognition system. **

## 2024-06-18 NOTE — ASSESSMENT & PLAN NOTE
Recent Labs     06/18/24  0945   WBC 10.34*     Mild leukocytosis  Does not meet sepsis criteria.  Monitor

## 2024-06-18 NOTE — PLAN OF CARE
Problem: PAIN - ADULT  Goal: Verbalizes/displays adequate comfort level or baseline comfort level  Description: Interventions:  - Encourage patient to monitor pain and request assistance  - Assess pain using appropriate pain scale  - Administer analgesics based on type and severity of pain and evaluate response  - Implement non-pharmacological measures as appropriate and evaluate response  - Consider cultural and social influences on pain and pain management  - Notify physician/advanced practitioner if interventions unsuccessful or patient reports new pain  Outcome: Progressing     Problem: INFECTION - ADULT  Goal: Absence or prevention of progression during hospitalization  Description: INTERVENTIONS:  - Assess and monitor for signs and symptoms of infection  - Monitor lab/diagnostic results  - Monitor all insertion sites, i.e. indwelling lines, tubes, and drains  - Monitor endotracheal if appropriate and nasal secretions for changes in amount and color  - [place] appropriate cooling/warming therapies per order  - Administer medications as ordered  - Instruct and encourage patient and family to use good hand hygiene technique  - Identify and instruct in appropriate isolation precautions for identified infection/condition  Outcome: Progressing  Goal: Absence of fever/infection during neutropenic period  Description: INTERVENTIONS:  - Monitor WBC    Outcome: Progressing     Problem: SAFETY ADULT  Goal: Patient will remain free of falls  Description: INTERVENTIONS:  - Educate patient/family on patient safety including physical limitations  - Instruct patient to call for assistance with activity   - Consult OT/PT to assist with strengthening/mobility   - Keep Call bell within reach  - Keep bed low and locked with side rails adjusted as appropriate  - Keep care items and personal belongings within reach  - Initiate and maintain comfort rounds  - Make Fall Risk Sign visible to staff  - Offer Toileting every 2 Hours,  in advance of need  - Initiate/Maintain alarm  - Obtain necessary fall risk management equipment  - Apply yellow socks and bracelet for high fall risk patients  - Consider moving patient to room near nurses station  Outcome: Progressing  Goal: Maintain or return to baseline ADL function  Description: INTERVENTIONS:  -  Assess patient's ability to carry out ADLs; assess patient's baseline for ADL function and identify physical deficits which impact ability to perform ADLs (bathing, care of mouth/teeth, toileting, grooming, dressing, etc.)  - Assess/evaluate cause of self-care deficits   - Assess range of motion  - Assess patient's mobility; develop plan if impaired  - Assess patient's need for assistive devices and provide as appropriate  - Encourage maximum independence but intervene and supervise when necessary  - Involve family in performance of ADLs  - Assess for home care needs following discharge   - Consider OT consult to assist with ADL evaluation and planning for discharge  - Provide patient education as appropriate  Outcome: Progressing  Goal: Maintains/Returns to pre admission functional level  Description: INTERVENTIONS:  - Perform AM-PAC 6 Click Basic Mobility/ Daily Activity assessment daily.  - Set and communicate daily mobility goal to care team and patient/family/caregiver.   - Collaborate with rehabilitation services on mobility goals if consulted  - Perform Range of Motion 4 times a day.  - Reposition patient every 2 hours.  - Dangle patient 3 times a day  - Stand patient 3 times a day  - Ambulate patient 3 times a day  - Out of bed to chair 3 times a day   - Out of bed for meals 3 times a day  - Out of bed for toileting  - Record patient progress and toleration of activity level   Outcome: Progressing     Problem: DISCHARGE PLANNING  Goal: Discharge to home or other facility with appropriate resources  Description: INTERVENTIONS:  - Identify barriers to discharge w/patient and caregiver  - Arrange  for needed discharge resources and transportation as appropriate  - Identify discharge learning needs (meds, wound care, etc.)  - Arrange for interpretive services to assist at discharge as needed  - Refer to Case Management Department for coordinating discharge planning if the patient needs post-hospital services based on physician/advanced practitioner order or complex needs related to functional status, cognitive ability, or social support system  Outcome: Progressing     Problem: Knowledge Deficit  Goal: Patient/family/caregiver demonstrates understanding of disease process, treatment plan, medications, and discharge instructions  Description: Complete learning assessment and assess knowledge base.  Interventions:  - Provide teaching at level of understanding  - Provide teaching via preferred learning methods  Outcome: Progressing

## 2024-06-18 NOTE — ASSESSMENT & PLAN NOTE
Presented with shortness of breath, productive cough for about a week and a half.  Was seen in urgent care a week ago and was started on steroids, antibiotics and albuterol as needed.  Patient reported he did not take steroids because they were expensive.  He took his albuterol as needed and the antibiotics however did not have much improvement.  On presentation patient had stable vital signs, at room air.  Chest x-ray normal however patient with wheezing on physical exam.  BNP normal, troponin is normal.  COVID/flu/RSV normal  Patient does not have history of lung disease however he had similar presentations in the past and was treated with steroids, antibiotics and discharged.  Patient did not follow with providers outpatient.  Patient denies current smoking, reported he smoked for a very short period of time when he was young and he was passive smoker in his childhood.  Unclear if the patient has any underlying lung disease however his presentation is similar with a COPD exacerbation  Patient would prefer oral steroids and not IV  Emergency department received DuoNebs and prednisone 50 mg orally.    Plan:  Start prednisone 40 mg daily tomorrow  DuoNebs  Albuterol as needed  Azithromycin 500 mg x 3 days  Mucinex  Will consult pulmonology as the patient had multiple similar presentations in the past and will need long-term management and outpatient follow-up

## 2024-06-18 NOTE — Clinical Note
Case was discussed with Janette and the patient's admission status was agreed to be Admission Status: observation status to the service of Dr. Savage .

## 2024-06-19 PROBLEM — D49.2 SKIN TUMOR: Status: ACTIVE | Noted: 2024-06-19

## 2024-06-19 LAB
ANION GAP SERPL CALCULATED.3IONS-SCNC: 8 MMOL/L (ref 4–13)
BASOPHILS # BLD AUTO: 0.05 THOUSANDS/ÂΜL (ref 0–0.1)
BASOPHILS NFR BLD AUTO: 0 % (ref 0–1)
BUN SERPL-MCNC: 13 MG/DL (ref 5–25)
CALCIUM SERPL-MCNC: 9.2 MG/DL (ref 8.4–10.2)
CHLORIDE SERPL-SCNC: 107 MMOL/L (ref 96–108)
CO2 SERPL-SCNC: 25 MMOL/L (ref 21–32)
CREAT SERPL-MCNC: 0.86 MG/DL (ref 0.6–1.3)
EOSINOPHIL # BLD AUTO: 0.14 THOUSAND/ÂΜL (ref 0–0.61)
EOSINOPHIL NFR BLD AUTO: 1 % (ref 0–6)
ERYTHROCYTE [DISTWIDTH] IN BLOOD BY AUTOMATED COUNT: 13.3 % (ref 11.6–15.1)
GFR SERPL CREATININE-BSD FRML MDRD: 91 ML/MIN/1.73SQ M
GLUCOSE SERPL-MCNC: 123 MG/DL (ref 65–140)
HCT VFR BLD AUTO: 43.3 % (ref 36.5–49.3)
HGB BLD-MCNC: 14.7 G/DL (ref 12–17)
IMM GRANULOCYTES # BLD AUTO: 0.04 THOUSAND/UL (ref 0–0.2)
IMM GRANULOCYTES NFR BLD AUTO: 0 % (ref 0–2)
LYMPHOCYTES # BLD AUTO: 3.04 THOUSANDS/ÂΜL (ref 0.6–4.47)
LYMPHOCYTES NFR BLD AUTO: 27 % (ref 14–44)
MCH RBC QN AUTO: 32 PG (ref 26.8–34.3)
MCHC RBC AUTO-ENTMCNC: 33.9 G/DL (ref 31.4–37.4)
MCV RBC AUTO: 94 FL (ref 82–98)
MONOCYTES # BLD AUTO: 1.27 THOUSAND/ÂΜL (ref 0.17–1.22)
MONOCYTES NFR BLD AUTO: 11 % (ref 4–12)
NEUTROPHILS # BLD AUTO: 6.91 THOUSANDS/ÂΜL (ref 1.85–7.62)
NEUTS SEG NFR BLD AUTO: 61 % (ref 43–75)
NRBC BLD AUTO-RTO: 0 /100 WBCS
PLATELET # BLD AUTO: 357 THOUSANDS/UL (ref 149–390)
PMV BLD AUTO: 10.4 FL (ref 8.9–12.7)
POTASSIUM SERPL-SCNC: 3.5 MMOL/L (ref 3.5–5.3)
PROCALCITONIN SERPL-MCNC: 0.07 NG/ML
RBC # BLD AUTO: 4.6 MILLION/UL (ref 3.88–5.62)
SODIUM SERPL-SCNC: 140 MMOL/L (ref 135–147)
WBC # BLD AUTO: 11.45 THOUSAND/UL (ref 4.31–10.16)

## 2024-06-19 PROCEDURE — 94760 N-INVAS EAR/PLS OXIMETRY 1: CPT

## 2024-06-19 PROCEDURE — 11604 EXC TR-EXT MAL+MARG 3.1-4 CM: CPT | Performed by: PHYSICIAN ASSISTANT

## 2024-06-19 PROCEDURE — 80048 BASIC METABOLIC PNL TOTAL CA: CPT | Performed by: INTERNAL MEDICINE

## 2024-06-19 PROCEDURE — 12032 INTMD RPR S/A/T/EXT 2.6-7.5: CPT | Performed by: PHYSICIAN ASSISTANT

## 2024-06-19 PROCEDURE — 99232 SBSQ HOSP IP/OBS MODERATE 35: CPT | Performed by: INTERNAL MEDICINE

## 2024-06-19 PROCEDURE — 84145 PROCALCITONIN (PCT): CPT | Performed by: INTERNAL MEDICINE

## 2024-06-19 PROCEDURE — 88305 TISSUE EXAM BY PATHOLOGIST: CPT | Performed by: PATHOLOGY

## 2024-06-19 PROCEDURE — 99222 1ST HOSP IP/OBS MODERATE 55: CPT | Performed by: SURGERY

## 2024-06-19 PROCEDURE — 94640 AIRWAY INHALATION TREATMENT: CPT

## 2024-06-19 PROCEDURE — 85025 COMPLETE CBC W/AUTO DIFF WBC: CPT | Performed by: INTERNAL MEDICINE

## 2024-06-19 PROCEDURE — 0JB70ZX EXCISION OF BACK SUBCUTANEOUS TISSUE AND FASCIA, OPEN APPROACH, DIAGNOSTIC: ICD-10-PCS | Performed by: SURGERY

## 2024-06-19 PROCEDURE — 99255 IP/OBS CONSLTJ NEW/EST HI 80: CPT | Performed by: INTERNAL MEDICINE

## 2024-06-19 RX ORDER — FLUTICASONE FUROATE AND VILANTEROL 100; 25 UG/1; UG/1
1 POWDER RESPIRATORY (INHALATION) DAILY
Status: DISCONTINUED | OUTPATIENT
Start: 2024-06-19 | End: 2024-06-20 | Stop reason: HOSPADM

## 2024-06-19 RX ORDER — LIDOCAINE HYDROCHLORIDE 10 MG/ML
10 INJECTION, SOLUTION EPIDURAL; INFILTRATION; INTRACAUDAL; PERINEURAL ONCE
Status: COMPLETED | OUTPATIENT
Start: 2024-06-19 | End: 2024-06-19

## 2024-06-19 RX ORDER — ACETAMINOPHEN 325 MG/1
650 TABLET ORAL EVERY 6 HOURS PRN
Status: DISCONTINUED | OUTPATIENT
Start: 2024-06-19 | End: 2024-06-20 | Stop reason: HOSPADM

## 2024-06-19 RX ORDER — OXYCODONE HYDROCHLORIDE 5 MG/1
5 TABLET ORAL EVERY 6 HOURS PRN
Status: DISCONTINUED | OUTPATIENT
Start: 2024-06-19 | End: 2024-06-20 | Stop reason: HOSPADM

## 2024-06-19 RX ADMIN — FLUTICASONE FUROATE AND VILANTEROL TRIFENATATE 1 PUFF: 100; 25 POWDER RESPIRATORY (INHALATION) at 13:45

## 2024-06-19 RX ADMIN — LEVALBUTEROL HYDROCHLORIDE 1.25 MG: 1.25 SOLUTION RESPIRATORY (INHALATION) at 19:30

## 2024-06-19 RX ADMIN — IPRATROPIUM BROMIDE 0.5 MG: 0.5 SOLUTION RESPIRATORY (INHALATION) at 19:30

## 2024-06-19 RX ADMIN — IPRATROPIUM BROMIDE 0.5 MG: 0.5 SOLUTION RESPIRATORY (INHALATION) at 13:38

## 2024-06-19 RX ADMIN — LEVALBUTEROL HYDROCHLORIDE 1.25 MG: 1.25 SOLUTION RESPIRATORY (INHALATION) at 13:38

## 2024-06-19 RX ADMIN — IPRATROPIUM BROMIDE 0.5 MG: 0.5 SOLUTION RESPIRATORY (INHALATION) at 07:27

## 2024-06-19 RX ADMIN — AZITHROMYCIN DIHYDRATE 500 MG: 500 TABLET ORAL at 08:20

## 2024-06-19 RX ADMIN — LIDOCAINE HYDROCHLORIDE 10 ML: 10 INJECTION, SOLUTION EPIDURAL; INFILTRATION; INTRACAUDAL; PERINEURAL at 14:30

## 2024-06-19 RX ADMIN — LEVALBUTEROL HYDROCHLORIDE 1.25 MG: 1.25 SOLUTION RESPIRATORY (INHALATION) at 07:27

## 2024-06-19 RX ADMIN — PREDNISONE 40 MG: 20 TABLET ORAL at 08:20

## 2024-06-19 NOTE — PROGRESS NOTES
Former[facility], later [facility]  Progress Note  Name: Albert Jensen I  MRN: [id number]  Unit/Bed#: -01 I Date of Admission: 6/18/2024   Date of Service: 6/19/2024 I Hospital Day: 0    Assessment & Plan   * Shortness of breath  Assessment & Plan  Presented with shortness of breath, productive cough for about a week and a half.  Was seen in urgent care a week ago and was started on steroids, antibiotics and albuterol as needed.  Patient reported he did not take steroids because they were expensive.  He took his albuterol as needed and the antibiotics however did not have much improvement.  On presentation patient had stable vital signs, at room air.  Chest x-ray normal however patient with wheezing on physical exam.  BNP normal, troponin is normal.  COVID/flu/RSV normal  Patient does not have history of lung disease however he had similar presentations in the past and was treated with steroids, antibiotics and discharged.  Patient did not follow with providers outpatient.  Patient denies current smoking, reported he smoked for a very short period of time when he was young and he was passive smoker in his childhood.  Unclear if the patient has any underlying lung disease however his presentation is similar with a COPD exacerbation  Patient reported some improvement.  Still with intermittent wheezing on auscultation    Plan:  Continue prednisone 40 mg daily   Pulmonology consulted.  Appreciate recommendations.  Per pulmonology started Breo and albuterol.  Patient will be discharged on Breo and albuterol   Continue azithromycin  DuoNebs  Mucinex    Skin tumor  Assessment & Plan  Rounded, about 3 cm skin tumor on his back that is bleeding   Will consult surgery     Elevated blood pressure reading  Assessment & Plan  Per chart review patient has history of hypertension however patient stated he does not have hypertension  Not on any blood pressure medication  Improved, monitor     Leukocytosis  Assessment &  Plan  Recent Labs     24  0945 24  0503   WBC 10.34* 11.45*     Mild leukocytosis  Does not meet sepsis criteria.  Worsened secondary to steroids used  Monitor    Schizophrenia (HCC)  Assessment & Plan  Per chart review  Patient currently stable, not on any medication, does not follow with providers           VTE Pharmacologic Prophylaxis: VTE Score: 3 Moderate Risk (Score 3-4) - Pharmacological DVT Prophylaxis Ordered: enoxaparin (Lovenox).    Mobility:   Basic Mobility Inpatient Raw Score: 23  JH-HLM Goal: 7: Walk 25 feet or more  JH-HLM Achieved: 7: Walk 25 feet or more  JH-HLM Goal achieved. Continue to encourage appropriate mobility.    Patient Centered Rounds: I performed bedside rounds with nursing staff today.   Discussions with Specialists or Other Care Team Provider: cm, pulmonology    Education and Discussions with Family / Patient: Patient declined call to .     Total Time Spent on Date of Encounter in care of patient: 36 mins. This time was spent on one or more of the following: performing physical exam; counseling and coordination of care; obtaining or reviewing history; documenting in the medical record; reviewing/ordering tests, medications or procedures; communicating with other healthcare professionals and discussing with patient's family/caregivers.    Current Length of Stay: 0 day(s)  Current Patient Status: Inpatient   Certification Statement: The patient will continue to require additional inpatient hospital stay due to SOB, skin tumor   Discharge Plan: Anticipate discharge in 24-48 hrs to home.    Code Status: Level 1 - Full Code    Subjective:     Feeling a little better since admission. Concerned about his tumor on the back     Objective:     Vitals:   Temp (24hrs), Av °F (36.7 °C), Min:97.7 °F (36.5 °C), Max:98.2 °F (36.8 °C)    Temp:  [97.7 °F (36.5 °C)-98.2 °F (36.8 °C)] 97.7 °F (36.5 °C)  HR:  [68] 68  Resp:  [18] 18  BP: (152-166)/(89-95) 166/89  SpO2:  [96  %] 96 %  Body mass index is 32.91 kg/m².     Input and Output Summary (last 24 hours):     Intake/Output Summary (Last 24 hours) at 6/19/2024 1402  Last data filed at 6/19/2024 1300  Gross per 24 hour   Intake 1080 ml   Output 400 ml   Net 680 ml       Physical Exam:   Physical Exam  Vitals and nursing note reviewed.   Constitutional:       General: He is not in acute distress.     Appearance: He is not diaphoretic.   HENT:      Head: Normocephalic.   Eyes:      General: No scleral icterus.        Right eye: No discharge.         Left eye: No discharge.   Cardiovascular:      Rate and Rhythm: Normal rate and regular rhythm.   Pulmonary:      Effort: Pulmonary effort is normal. No respiratory distress.      Breath sounds: Wheezing present. No rhonchi or rales.   Abdominal:      General: There is no distension.      Palpations: Abdomen is soft.      Tenderness: There is no abdominal tenderness. There is no guarding or rebound.   Musculoskeletal:      Cervical back: Normal range of motion.      Right lower leg: No edema.      Left lower leg: No edema.   Skin:     General: Skin is warm.   Neurological:      Mental Status: He is alert.   Psychiatric:         Mood and Affect: Mood normal.         Behavior: Behavior normal.          Additional Data:     Labs:  Results from last 7 days   Lab Units 06/19/24  0503   WBC Thousand/uL 11.45*   HEMOGLOBIN g/dL 14.7   HEMATOCRIT % 43.3   PLATELETS Thousands/uL 357   SEGS PCT % 61   LYMPHO PCT % 27   MONO PCT % 11   EOS PCT % 1     Results from last 7 days   Lab Units 06/19/24  0503 06/18/24  0945   SODIUM mmol/L 140 137   POTASSIUM mmol/L 3.5 3.4*   CHLORIDE mmol/L 107 100   CO2 mmol/L 25 28   BUN mg/dL 13 15   CREATININE mg/dL 0.86 0.90   ANION GAP mmol/L 8 9   CALCIUM mg/dL 9.2 9.2   ALBUMIN g/dL  --  4.7   TOTAL BILIRUBIN mg/dL  --  0.78   ALK PHOS U/L  --  83   ALT U/L  --  27   AST U/L  --  22   GLUCOSE RANDOM mg/dL 123 130                 Results from last 7 days   Lab Units  06/19/24  0503   PROCALCITONIN ng/ml 0.07       Lines/Drains:  Invasive Devices       Peripheral Intravenous Line  Duration             Peripheral IV 06/18/24 Left Antecubital 1 day                          Imaging: No pertinent imaging reviewed.    Recent Cultures (last 7 days):         Last 24 Hours Medication List:   Current Facility-Administered Medications   Medication Dose Route Frequency Provider Last Rate    albuterol  1 puff Inhalation Q6H PRN Dianne Louise MD      azithromycin  500 mg Oral Q24H Dianne Louise MD      enoxaparin  40 mg Subcutaneous Daily Dianne Louise MD      Fluticasone Furoate-Vilanterol  1 puff Inhalation Daily BRENTON Nguyen      guaiFENesin  600 mg Oral BID Dianne Louise MD      ipratropium  0.5 mg Nebulization TID Dianne Louise MD      levalbuterol  1.25 mg Nebulization TID Dianne Louise MD      lidocaine (PF)  10 mL Infiltration Once Tarsha Sheehan PA-C      predniSONE  40 mg Oral Daily Dianne Louise MD          Today, Patient Was Seen By: Dianne Louise MD    **Please Note: This note may have been constructed using a voice recognition system.**

## 2024-06-19 NOTE — CASE MANAGEMENT
Case Management Assessment & Discharge Planning Note    Patient name Albert Jensen  Location /-01 MRN [id number]  : 1960 Date 2024       Current Admission Date: 2024  Current Admission Diagnosis:Shortness of breath   Patient Active Problem List    Diagnosis Date Noted Date Diagnosed    Shortness of breath 2024     Elevated blood pressure reading 2024     Varicose veins of both lower extremities 2021     Acute bronchitis 2021     Suspected reactive airway disease 2021     Schizophrenia (HCC) 2021     Hypokalemia 2021     Leukocytosis 2021       LOS (days): 0  Geometric Mean LOS (GMLOS) (days):   Days to GMLOS:     OBJECTIVE:              Current admission status: Observation       Preferred Pharmacy:   [id number] [place] - [phone]   [place] [id number]  Phone: [phone] Fax: [phone]    Primary Care Provider: Jasson Warner DO    Primary Insurance:   Secondary Insurance:     ASSESSMENT:  Active Health Care Proxies    There are no active Health Care Proxies on file.                 Readmission Root Cause  30 Day Readmission: No    Patient Information  Admitted from:: Home  Mental Status: Alert  During Assessment patient was accompanied by: Not accompanied during assessment  Assessment information provided by:: Patient  Primary Caregiver: Self  Support Systems: Self, Family members  County of Residence: [place]  What [facility] do you live in?: [place]  Home entry access options. Select all that apply.: Stairs  Number of steps to enter home.: 2  Type of Current Residence: [facility]  Living Arrangements: Lives Alone  Is patient a ?: No    Activities of Daily Living Prior to Admission  Functional Status: Independent  Completes ADLs independently?: Yes  Ambulates independently?: Yes  Does patient use assisted devices?: No  Does patient currently own DME?: No  Does patient have a history of Outpatient Therapy  (PT/OT)?: No  Does the patient have a history of Short-Term Rehab?: No  Does patient have a history of HHC?: No  Does patient currently have HHC?: No         Patient Information Continued  Income Source: Pension/residential  Does patient have prescription coverage?: No (PATHS referral sent to hospital financial counselors.)  Does patient receive dialysis treatments?: No  Does patient have a history of substance abuse?: No  Does patient have a history of Mental Health Diagnosis?: Yes (Schizophrenia)  Is patient receiving treatment for mental health?: No. Patient declined treatment information.  Has patient received inpatient treatment related to mental health in the last 2 years?: No         Means of Transportation  Means of Transport to Appts:: Drives Self      Social Determinants of Health (SDOH)      Flowsheet Row Most Recent Value   Housing Stability    In the last 12 months, was there a time when you were not able to pay the mortgage or rent on time? N   At any time in the past 12 months, were you homeless or living in a shelter (including now)? N   Transportation Needs    In the past 12 months, has lack of transportation kept you from medical appointments or from getting medications? no   In the past 12 months, has lack of transportation kept you from meetings, work, or from getting things needed for daily living? No   Food Insecurity    Within the past 12 months, you worried that your food would run out before you got the money to buy more. Never true   Within the past 12 months, the food you bought just didn't last and you didn't have money to get more. Never true   Utilities    In the past 12 months has the electric, gas, oil, or water company threatened to shut off services in your home? No            DISCHARGE DETAILS:    Discharge planning discussed with:: Patient at bedside.  Freedom of Choice: Yes  Comments - Freedom of Choice: No rehab needs at this time.  CM contacted family/caregiver?: Yes (Left VM  for pt's sister.)  Were Treatment Team discharge recommendations reviewed with patient/caregiver?: Yes  Did patient/caregiver verbalize understanding of patient care needs?: Yes  Were patient/caregiver advised of the risks associated with not following Treatment Team discharge recommendations?: Yes    Contacts  Patient Contacts: kayla sousa     [phone]  Relationship to Patient:: Family  Contact Method: Phone  Phone Number: kayla sousa     [phone]  Reason/Outcome: Continuity of Care, Emergency Contact, Discharge Planning    Requested Home Health Care         Is the patient interested in HHC at discharge?: No    DME Referral Provided  Referral made for DME?: No    Other Referral/Resources/Interventions Provided:  Financial Resources Provided: Financial Counselor  Referral Comments: Patient respectfull declined all Cm support. Reported he was working with the [employer] and needs to make sure all of his information remains private. CM did send PATHS referral to see if pt would be willing to complete application for medical assistance. Pt declined to allow CM to set him up with a PCP appointment. CM did put local PCP office in pt's after visit summary in case he changes his mind upon discharge and wants to schedule an appointment.    Family notified:: CM left VM for pt's sister Kayla, requesting return pc.

## 2024-06-19 NOTE — PROCEDURES
Procedure    Excision of skin lesion     06/19/24 1400   Performed by:  Tarsha Sheehan PA-C   No assisting provider   Written consent obtained from patient   Risks and benefits discussed with patient   Time-out was called immediately prior to procedure   Site was marked   Required equipment available   Patient  Identity confirmed verbally with patient arm band    Lobulated, raised skin lesion, with mild necrotic edges and bloody oozing without surrounding erythema, induration, or signs of active infection  located over at left mid upper abck    Size 2.5 x 2 cm    Skin was prepped with Betadine swab  Local anesthetic, 10 mL of of 1% lidocaine was infiltrated     Anesthetic effect was verified prior to proceeding    Elliptical incision was used to remove skin and subcutaneous tissue mass    Remaining defect measured approximately 4 cm x 2 cm x  2  cm   There was mild oozing that was controlled with pressure and suturing    Defect was closed with 3-0 Vicryl suture in deep and superficial subcutaneous tissue   3-0 Nylon sutures were place at skin edges x 6    Dry sterile 4x4 dressing was applied    Patient tolerated procedure well  Instructions reviewed to keep wound clean and dry.  May shower over site in 2 days, pat dry, no soaking in tub or pool    Tylenol and low dose Oxy for pain control as needed  Apply ice this evening    Specimen sent for pathological evaluation    Patient to follow-up in office in 7-10 days for suture removal and pathology review.  Patient does understand that if margins are postive that he may require further excision    Tarsha Sheehan

## 2024-06-19 NOTE — UTILIZATION REVIEW
Inpatient  Stay Review      Admission: Date/Time/Statement: 6/18/24 1258 observation  AND CHANGED 6/19/24 1358 INPATIENT RE:  PATIENT NEEDS > 2 MIDNIGHT STAY DUE TO SHORTNESS OF BREATH ON CONTINUED STEROIDS, STARTED ON BREO AND ALBUTEROL  WITH PULMONARY TO FOLLOW;  BLEEDING SKIN TUMOR ON BACK AND SURGERY IS CONSULTED.     Admission Orders (From admission, onward)       Ordered        06/19/24 1358  INPATIENT ADMISSION  Once                              Orders Placed This Encounter   Procedures    INPATIENT ADMISSION     Standing Status:   Standing     Number of Occurrences:   1     Order Specific Question:   Level of Care     Answer:   Med Surg [16]     Order Specific Question:   Estimated length of stay     Answer:   More than 2 Midnights     Order Specific Question:   Certification     Answer:   I certify that inpatient services are medically necessary for this patient for a duration of greater than two midnights. See H&P and MD Progress Notes for additional information about the patient's course of treatment.      Date: 6/19/24                           Current Patient Class: INPATIENT   Current Level of Care: med surg    HPI:63 y.o. male initially admitted on 6/18/24 to observation and converted to inpatient on 6/19/24.   Patient with shortness of breath and cough for last week and a half.  Seen at Urgent care and prescribed antibiotics, steroids and albuterol.  Due to cost issues unable to take steroids.        Assessment/Plan:   6/19/24 CHANGED TO INPATIENT:   Has continued intermittent wheezing.  Still with some shortness of breath.  Concerned about back lesion and started bleeding.    On exam lungs with wheezing.  Rounded, about 3 cm skin tumor on his back that is bleeding   Plan:  continue prednisone.   Pulmonary on consult and have started Bro and albuterol.   Continue azithromycin and Duonebs. Mucinex.  Consult surgery for back lesion    6/19/24 per Respiratory - acute respiratory insuffiencey, reactive  airway with suspected exacerbation. History of mild eosinophilia (420, 790, 50, 330).     Plan is start Breo and albuterol.  Continue prednisone, azithromycin.  Continue scheduled nebs  will need OP follow up and PFTs    6/19/24 per surgery - 64 y/o M with PMHx schizophrenia admitted with acute respiratory insufficiency with finding of small bleeding skin mass of left mid back.  Suspected skin lesion is likely basal or squamous cell skin cancer.  On exam: raised lesion with bleeding at edges, measuring 2.5 x 2 cm, no significant tenderness, no surrounding erythema, induration, drainage, or signs of active infection. Plan:  bedside excision.  Pain control.  Follow path. Ice to site.  Monitor incision site.       Certification Statement: The patient will continue to require additional inpatient hospital stay due to SOB, skin tumor      6/20/24:  has continued intermittent shortness of breath.  Productive cough.   Agitated about medications prescribed this admission as different then what PCP does.  Status post excision back lesion.   On exam:  lungs bilateral scattered rhonchi.  Incision is clean with mild drainage noted. Dressing changed.     Plan: continue azithromycin and prednisone.  Wants a methylprednisolone dose pack not scheduled prednisone.   Continue Breo and scheduled nebs.  Does not want a maintenance inhale. Monitor incision site.   Follow path.       Vital Signs (last 3 days)       Date/Time Temp Pulse Resp BP MAP (mmHg) SpO2 O2 Device Patient Position - Orthostatic VS Tatiana Coma Scale Score Pain    06/20/24 07:37:14 98.4 °F (36.9 °C) 70 20 146/83 104 95 % -- -- -- --    06/20/24 0722 -- -- -- -- -- 92 % None (Room air) -- -- --    06/19/24 2100 -- -- -- -- -- -- -- -- 15 No Pain    06/19/24 20:26:33 98.4 °F (36.9 °C) 76 -- 150/75 100 92 % None (Room air) Sitting -- --    06/19/24 1900 -- -- -- -- -- 94 % -- -- -- --    06/19/24 15:20:08 97.7 °F (36.5 °C) 82 -- 134/84 101 92 % None (Room air) Sitting  -- --    06/19/24 1339 -- -- -- -- -- 96 % None (Room air) -- -- --    06/19/24 0728 -- -- -- -- -- -- None (Room air) -- 15 No Pain    06/19/24 07:02:27 97.7 °F (36.5 °C) 68 18 166/89 115 96 % -- -- -- --    06/18/24 2000 98.2 °F (36.8 °C) -- -- 152/95 -- -- -- Lying 15 No Pain    06/18/24 1900 -- -- -- -- -- 96 % -- -- -- --    06/18/24 1454 -- -- -- -- -- -- None (Room air) -- -- --    06/18/24 13:44:48 97.9 °F (36.6 °C) 90 -- 169/103 125 95 % -- -- -- No Pain    06/18/24 1343 -- -- -- -- -- -- -- -- -- No Pain    06/18/24 1309 -- 88 22 140/66 95 93 % None (Room air) Sitting -- No Pain    06/18/24 1245 -- -- -- -- -- -- -- -- 15 --    06/18/24 1230 -- 76 22 176/79 113 92 % -- -- -- --    06/18/24 1143 -- -- -- -- -- -- -- -- -- No Pain    06/18/24 1130 -- 74 22 129/74 96 95 % None (Room air) Sitting -- --    06/18/24 1118 -- -- -- -- -- -- None (Room air) -- -- --    06/18/24 1037 -- -- -- -- -- -- -- -- -- No Pain    06/18/24 0940 -- -- -- -- -- -- -- -- 15 --    06/18/24 0931 97.9 °F (36.6 °C) 83 22 194/82 -- 94 % None (Room air) Sitting -- No Pain          Weight (last 2 days)       Date/Time Weight    06/18/24 2000 95.3 (210.1)    06/18/24 0931 95.3 (210)              Pertinent Labs/Diagnostic Results:   Radiology:  XR chest 1 view portable   Final Interpretation by Felicity Daniel MD (06/18 1045)      No acute cardiopulmonary disease.            Workstation performed: [id number]           Cardiology:  No orders to display     GI:  No orders to display       Results from last 7 days   Lab Units 06/18/24  0945   SARS-COV-2  Negative     Results from last 7 days   Lab Units 06/20/24 0456 06/19/24  0503 06/18/24  0945   WBC Thousand/uL 9.54 11.45* 10.34*   HEMOGLOBIN g/dL 13.2 14.7 14.7   HEMATOCRIT % 40.1 43.3 43.1   PLATELETS Thousands/uL 316 357 348   TOTAL NEUT ABS Thousands/µL  --  6.91 6.93         Results from last 7 days   Lab Units 06/20/24 0456 06/19/24  0503 06/18/24  0945   SODIUM mmol/L 139  140 137   POTASSIUM mmol/L 3.6 3.5 3.4*   CHLORIDE mmol/L 107 107 100   CO2 mmol/L 26 25 28   ANION GAP mmol/L 6 8 9   BUN mg/dL 11 13 15   CREATININE mg/dL 0.88 0.86 0.90   EGFR ml/min/1.73sq m 90 91 89   CALCIUM mg/dL 8.8 9.2 9.2     Results from last 7 days   Lab Units 06/18/24  0945   AST U/L 22   ALT U/L 27   ALK PHOS U/L 83   TOTAL PROTEIN g/dL 7.3   ALBUMIN g/dL 4.7   TOTAL BILIRUBIN mg/dL 0.78     Results from last 7 days   Lab Units 06/20/24  0456 06/19/24  0503 06/18/24  0945   GLUCOSE RANDOM mg/dL 126 123 130     Results from last 7 days   Lab Units 06/18/24  1302 06/18/24  1113 06/18/24  0945   HS TNI 0HR ng/L  --   --  7   HS TNI 2HR ng/L  --  6  --    HSTNI D2 ng/L  --  -1  --    HS TNI 4HR ng/L 5  --   --    HSTNI D4 ng/L -2  --   --      Results from last 7 days   Lab Units 06/19/24  0503   PROCALCITONIN ng/ml 0.07     Results from last 7 days   Lab Units 06/18/24  0945   BNP pg/mL 16     Results from last 7 days   Lab Units 06/18/24  0945   INFLUENZA A PCR  Negative   INFLUENZA B PCR  Negative   RSV PCR  Negative       Medications:   Scheduled Medications:  azithromycin, 500 mg, Oral, Q24H  enoxaparin, 40 mg, Subcutaneous, Daily  Fluticasone Furoate-Vilanterol, 1 puff, Inhalation, Daily  guaiFENesin, 600 mg, Oral, BID  ipratropium, 0.5 mg, Nebulization, TID  levalbuterol, 1.25 mg, Nebulization, TID  predniSONE, 40 mg, Oral, Daily    lidocaine (PF) (XYLOCAINE-MPF) 1 % injection 10 mL  Dose: 10 mL  Freq: Once Route: INFILTRATION  Indications of Use: LOCAL ANESTHESIA  Indications Comment: for bedside procedure  Start: 06/19/24 1345 End: 06/19/24 1430    Continuous IV Infusions:     PRN Meds: not used.   acetaminophen, 650 mg, Oral, Q6H PRN  albuterol, 1 puff, Inhalation, Q6H PRN  oxyCODONE, 5 mg, Oral, Q6H PRN      Discharge Plan:  to be determined    Network Utilization Review Department  ATTENTION: Please call with any questions or concerns to [phone] and carefully listen to the prompts so that  you are directed to the right person. All voicemails are confidential.   For Discharge needs, contact Care Management DC Support Team at [phone] opt. [phone]  Send all requests for admission clinical reviews, approved or denied determinations and any other requests to dedicated fax number below belonging to the campus where the patient is receiving treatment. List of dedicated fax numbers for the Facilities:  FACILITY NAME UR FAX NUMBER   ADMISSION DENIALS (Administrative/Medical Necessity) [phone]   DISCHARGE SUPPORT TEAM (NETWORK) [phone]   PARENT CHILD HEALTH (Maternity/NICU/Pediatrics) [phone]   [facility] [phone]   [facility] [phone]   [facility] [phone]   [facility] [phone]   [facility] [phone]   [facility] [phone]   [facility] [phone]   [facility] [phone]   [facility] [phone]   [facility] [phone]   [facility] [phone]   [facility] [phone]

## 2024-06-19 NOTE — PLAN OF CARE
Problem: PAIN - ADULT  Goal: Verbalizes/displays adequate comfort level or baseline comfort level  Description: Interventions:  - Encourage patient to monitor pain and request assistance  - Assess pain using appropriate pain scale  - Administer analgesics based on type and severity of pain and evaluate response  - Implement non-pharmacological measures as appropriate and evaluate response  - Consider cultural and social influences on pain and pain management  - Notify physician/advanced practitioner if interventions unsuccessful or patient reports new pain  Outcome: Progressing     Problem: INFECTION - ADULT  Goal: Absence or prevention of progression during hospitalization  Description: INTERVENTIONS:  - Assess and monitor for signs and symptoms of infection  - Monitor lab/diagnostic results  - Monitor all insertion sites, i.e. indwelling lines, tubes, and drains  - Monitor endotracheal if appropriate and nasal secretions for changes in amount and color  - [place] appropriate cooling/warming therapies per order  - Administer medications as ordered  - Instruct and encourage patient and family to use good hand hygiene technique  - Identify and instruct in appropriate isolation precautions for identified infection/condition  Outcome: Progressing  Goal: Absence of fever/infection during neutropenic period  Description: INTERVENTIONS:  - Monitor WBC    Outcome: Progressing     Problem: SAFETY ADULT  Goal: Patient will remain free of falls  Description: INTERVENTIONS:  - Educate patient/family on patient safety including physical limitations  - Instruct patient to call for assistance with activity   - Consult OT/PT to assist with strengthening/mobility   - Keep Call bell within reach  - Keep bed low and locked with side rails adjusted as appropriate  - Keep care items and personal belongings within reach  - Initiate and maintain comfort rounds  - Make Fall Risk Sign visible to staff  - Offer Toileting every 2 Hours,  in advance of need  - Initiate/Maintain alarm  - Obtain necessary fall risk management equipment  - Apply yellow socks and bracelet for high fall risk patients  - Consider moving patient to room near nurses station  Outcome: Progressing  Goal: Maintain or return to baseline ADL function  Description: INTERVENTIONS:  -  Assess patient's ability to carry out ADLs; assess patient's baseline for ADL function and identify physical deficits which impact ability to perform ADLs (bathing, care of mouth/teeth, toileting, grooming, dressing, etc.)  - Assess/evaluate cause of self-care deficits   - Assess range of motion  - Assess patient's mobility; develop plan if impaired  - Assess patient's need for assistive devices and provide as appropriate  - Encourage maximum independence but intervene and supervise when necessary  - Involve family in performance of ADLs  - Assess for home care needs following discharge   - Consider OT consult to assist with ADL evaluation and planning for discharge  - Provide patient education as appropriate  Outcome: Progressing  Goal: Maintains/Returns to pre admission functional level  Description: INTERVENTIONS:  - Perform AM-PAC 6 Click Basic Mobility/ Daily Activity assessment daily.  - Set and communicate daily mobility goal to care team and patient/family/caregiver.   - Collaborate with rehabilitation services on mobility goals if consulted  - Perform Range of Motion 4 times a day.  - Reposition patient every 2 hours.  - Dangle patient 3 times a day  - Stand patient 3 times a day  - Ambulate patient 3 times a day  - Out of bed to chair 3 times a day   - Out of bed for meals 3 times a day  - Out of bed for toileting  - Record patient progress and toleration of activity level   Outcome: Progressing     Problem: DISCHARGE PLANNING  Goal: Discharge to home or other facility with appropriate resources  Description: INTERVENTIONS:  - Identify barriers to discharge w/patient and caregiver  - Arrange  for needed discharge resources and transportation as appropriate  - Identify discharge learning needs (meds, wound care, etc.)  - Arrange for interpretive services to assist at discharge as needed  - Refer to Case Management Department for coordinating discharge planning if the patient needs post-hospital services based on physician/advanced practitioner order or complex needs related to functional status, cognitive ability, or social support system  Outcome: Progressing     Problem: Knowledge Deficit  Goal: Patient/family/caregiver demonstrates understanding of disease process, treatment plan, medications, and discharge instructions  Description: Complete learning assessment and assess knowledge base.  Interventions:  - Provide teaching at level of understanding  - Provide teaching via preferred learning methods  Outcome: Progressing

## 2024-06-19 NOTE — ASSESSMENT & PLAN NOTE
Per chart review patient has history of hypertension however patient stated he does not have hypertension  Not on any blood pressure medication  Improved, monitor

## 2024-06-19 NOTE — CONSULTS
Consultation - General Surgery   Albert Jensen 63 y.o. male MRN: [id number]  Unit/Bed#: -01 Encounter: [id number]    Assessment & Plan     62 y/o M with PMHx schizophrenia admitted with acute respiratory insufficiency with finding of small bleeding skin mass of left mid back    Skin lesion  -likely basal or squamous cell skin cancer  -present since 2011 per patient   -raised lesion with bleeding at edges, measuring 2.5 x 2 cm, no significant tenderness, no surrounding erythema, induration, drainage, or signs of active infection    -Area excised at bedside.  Closed with deep and superficial sutures.  Patient tolerated well.  Will send for pathologic evaluation  Pain control as needed, use Tylenol, will add low dose Oxy  Apply ice to site   Monitor incision site.  Keep clean and dry.  Follow pathology  Did discuss that if margins are positive patient would require further excision from this site.  -Outpatient follow-up in 1 week after discharge to remove sutures and review pathology      Respiratory insuffiency/SOB  -active wheezing  -former tobacco  Medical mangement      Schizophrenia  -per chart review, not on meds      History of Present Illness     HPI:  Albert Jensen is a 63 y.o. male with history of schizophrenia, not currently on medications, who presented to the emergency department yesterday with complaints of shortness of breath.  Patient with significant wheezing and cough.  Patient has been admitted for acute respiratory insufficiency.  Being treated with nebulizers and steroids.  He was incidentally noted to have a raised bleeding mass on his left mid back area.  We were consulted for evaluation.  Patient denies any previous history of skin cancer or lesions Patient is not diabetic.  He is not a current smoker. C/o daily bleeding from this site that is ruining his clothes    Consults    Review of Systems   Constitutional: Negative.  Negative for appetite change, fever and unexpected weight change.  "  HENT: Negative.     Eyes: Negative.    Respiratory:  Positive for cough, shortness of breath and wheezing.    Cardiovascular: Negative.  Negative for chest pain and palpitations.   Gastrointestinal: Negative.  Negative for abdominal pain, nausea and vomiting.   Endocrine: Negative.    Genitourinary: Negative.  Negative for difficulty urinating and dysuria.   Musculoskeletal: Negative.    Skin:  Positive for wound.   Allergic/Immunologic: Negative.    Neurological: Negative.  Negative for syncope and weakness.   Hematological: Negative.  Does not bruise/bleed easily.   Psychiatric/Behavioral: Negative.     All other systems reviewed and are negative.      Historical Information   Past Medical History:   Diagnosis Date    Deafness in right ear     Hypertension      Past Surgical History:   Procedure Laterality Date    SPLENECTOMY, TOTAL       Social History   Social History     Substance and Sexual Activity   Alcohol Use Not Currently     Social History     Substance and Sexual Activity   Drug Use Not Currently     E-Cigarette/Vaping    E-Cigarette Use Never User      E-Cigarette/Vaping Substances    Nicotine No     THC No     CBD No     Flavoring No     Other No     Unknown No      Social History     Tobacco Use   Smoking Status Former    Current packs/day: 0.00    Types: Cigarettes    Quit date: 1987    Years since quittin.7   Smokeless Tobacco Never     Family History: non-contributory    Meds/Allergies   all current active meds have been reviewed  No Known Allergies    Objective   First Vitals:   Blood Pressure: (!) 194/82 (24)  Pulse: 83 (24)  Temperature: 97.9 °F (36.6 °C) (24)  Temp Source: Oral (24)  Respirations: 22 (24)  Height: 5' 7\" (170.2 cm) (24)  Weight - Scale: 95.3 kg (210 lb) (24)  SpO2: 94 % (24)    Current Vitals:   Blood Pressure: 166/89 (24)  Pulse: 68 (24)  Temperature: 97.7 " "°F (36.5 °C) (06/19/24 0702)  Temp Source: Temporal (06/18/24 2000)  Respirations: 18 (06/19/24 0702)  Height: 5' 7\" (170.2 cm) (06/18/24 2000)  Weight - Scale: 95.3 kg (210 lb 1.6 oz) (06/18/24 2000)  SpO2: 96 % (06/19/24 0702)      Intake/Output Summary (Last 24 hours) at 6/19/2024 1318  Last data filed at 6/19/2024 1300  Gross per 24 hour   Intake 1080 ml   Output 400 ml   Net 680 ml       Invasive Devices       Peripheral Intravenous Line  Duration             Peripheral IV 06/18/24 Left Antecubital 1 day                    Physical Exam  Constitutional:       General: He is not in acute distress.     Appearance: He is well-developed. He is not diaphoretic.   HENT:      Head: Normocephalic and atraumatic.      Mouth/Throat:      Mouth: Oropharynx is clear and moist.      Pharynx: No oropharyngeal exudate.   Eyes:      General: No scleral icterus.        Right eye: No discharge.         Left eye: No discharge.      Extraocular Movements: EOM normal.   Neck:      Thyroid: No thyromegaly.      Vascular: No JVD.      Trachea: No tracheal deviation.   Cardiovascular:      Rate and Rhythm: Normal rate and regular rhythm.      Heart sounds: Normal heart sounds. No murmur heard.  Pulmonary:      Effort: Pulmonary effort is normal. No respiratory distress.      Breath sounds: Wheezing present.   Abdominal:      General: Bowel sounds are normal. There is no distension.      Palpations: Abdomen is soft.      Tenderness: There is no abdominal tenderness.      Comments: obese   Musculoskeletal:         General: No deformity or edema. Normal range of motion.      Cervical back: Normal range of motion and neck supple.   Skin:     General: Skin is warm and dry.      Findings: Lesion present. No rash.      Comments: Raised lesion with mild necrotic edges with mild bloody ooze, non-tender  No drainage, foul odor, induration or signs of infection   Neurological:      Mental Status: He is alert and oriented to person, place, and " "time.      Comments: No focal deficits   Psychiatric:         Mood and Affect: Mood and affect normal.         Behavior: Behavior normal.         Lab Results: I have personally reviewed pertinent lab results.  , CBC:   Lab Results   Component Value Date    WBC 11.45 (H) 06/19/2024    HGB 14.7 06/19/2024    HCT 43.3 06/19/2024    MCV 94 06/19/2024     06/19/2024    RBC 4.60 06/19/2024    MCH 32.0 06/19/2024    MCHC 33.9 06/19/2024    RDW 13.3 06/19/2024    MPV 10.4 06/19/2024    NRBC 0 06/19/2024   , CMP:   Lab Results   Component Value Date    SODIUM 140 06/19/2024    K 3.5 06/19/2024     06/19/2024    CO2 25 06/19/2024    BUN 13 06/19/2024    CREATININE 0.86 06/19/2024    CALCIUM 9.2 06/19/2024    EGFR 91 06/19/2024   , Coagulation: No results found for: \"PT\", \"INR\", \"APTT\"  Imaging: I have personally reviewed pertinent reports.    EKG, Pathology, and Other Studies: I have personally reviewed pertinent reports.      Counseling / Coordination of Care  Total floor / unit time spent today 30 minutes.  Greater than 50% of total time was spent with the patient and / or family counseling and / or coordination of care.  A description of the counseling / coordination of care    Tarsha Sheehan      "

## 2024-06-19 NOTE — UTILIZATION REVIEW
Initial Clinical Review    Admission: Date/Time/Statement: 6/18/24 1258 observation   Admission Orders (From admission, onward)       Ordered        06/18/24 1258  Place in Observation  Once                          Orders Placed This Encounter   Procedures    Place in Observation     Standing Status:   Standing     Number of Occurrences:   1     Order Specific Question:   Level of Care     Answer:   Med Surg [16]     ED Arrival Information       Expected   -    Arrival   6/18/2024 09:25    Acuity   Urgent              Means of arrival   Walk-In    Escorted by   Family Member    Service   Hospitalist    Admission type   Emergency              Arrival complaint   Sob             Chief Complaint   Patient presents with    Shortness of Breath     SOB x 2 weeks. Coughing up phlegm. Denies CP. Also c/o having lesion on back. Pt states he wants a refill on azithromycin and prednisone.       Initial Presentation: 63 y.o. male  to ED via walk in from home.    Admitted to observation with Dx: shortness of breath/elevated BP.  Presented to ED with shortness of breath starting 10 days to 2 weeks prior to arrival.  + cough with yellow phlegm.  Unable to afford inhaler prescribed.   PMHx: schizophrenia, hpt.   On exam: hypertensive.  lungs wheezing and decreased breath sounds.  Purse lip breathing. Angioma on left lower back.  K 3.4  hs tnl 7.  Wbc 10.34.   Imaging shows no acute findings on CxR.   ED treatment:  Pinon neb, Mg and steroids with only mild improvement.  Given additional 2 Nebs with continued shortness of breath and wheezing.    Plan includes start prednisone tomorrow as patient prefers oral steroids.   Continue Duonebs.  Albuterol as needed.  Start azithromycin and Mucinex.  Consult Pulmonary.  Monitor BP, if persistently elevated start antihypertensives.  .     Anticipated Length of Stay/Certification Statement: Patient will be admitted on an observation basis with an anticipated length of stay of less than 2  midnights secondary to sob.       ED Triage Vitals [06/18/24 0931]   Temperature Pulse Respirations Blood Pressure SpO2 Pain Score   97.9 °F (36.6 °C) 83 22 (!) 194/82 94 % No Pain     Weight (last 2 days)       Date/Time Weight    06/18/24 2000 95.3 (210.1)    06/18/24 0931 95.3 (210)            Vital Signs (last 3 days)       Date/Time Temp Pulse Resp BP MAP (mmHg) SpO2 O2 Device Patient Position - Orthostatic VS [place] Coma Scale Score Pain    06/19/24 07:02:27 97.7 °F (36.5 °C) 68 18 166/89 115 96 % -- -- -- --    06/18/24 2000 98.2 °F (36.8 °C) -- -- 152/95 -- -- -- Lying 15 No Pain    06/18/24 1900 -- -- -- -- -- 96 % -- -- -- --    06/18/24 13:44:48 97.9 °F (36.6 °C) 90 -- 169/103 125 95 % -- -- -- No Pain    06/18/24 1343 -- -- -- -- -- -- -- -- -- No Pain    06/18/24 1309 -- 88 22 140/66 95 93 % None (Room air) Sitting -- No Pain    06/18/24 1230 -- 76 22 176/79 113 92 % -- -- -- --    06/18/24 1143 -- -- -- -- -- -- -- -- -- No Pain    06/18/24 1130 -- 74 22 129/74 96 95 % None (Room air) Sitting -- --    06/18/24 0940 -- -- -- -- -- -- -- -- 15 --    06/18/24 0931 97.9 °F (36.6 °C) 83 22 194/82 -- 94 % None (Room air) Sitting -- No Pain              Pertinent Labs/Diagnostic Test Results:   Radiology:  XR chest 1 view portable   Final Interpretation by Felicity Daniel MD (06/18 1045)      No acute cardiopulmonary disease.            Workstation performed: [id number]           Cardiology:  Date/Time: 6/18/2024 9:35 AM   Indications / Diagnosis:  Shortness of breath   ECG reviewed by the ED Provider: yes    Patient location:  ED   Interpretation:     Interpretation: normal    Rate:     ECG rate:  79     ECG rate assessment: normal    Rhythm:     Rhythm: sinus rhythm    Ectopy:     Ectopy: none    QRS:     QRS axis:  Normal   Conduction:     Conduction: normal    ST segments:     ST segments:  Normal   T waves:     T waves: normal     Results from last 7 days   Lab Units 06/18/24  0945   SARS-COV-2   Negative     Results from last 7 days   Lab Units 06/19/24  0503 06/18/24  0945   WBC Thousand/uL 11.45* 10.34*   HEMOGLOBIN g/dL 14.7 14.7   HEMATOCRIT % 43.3 43.1   PLATELETS Thousands/uL 357 348   TOTAL NEUT ABS Thousands/µL 6.91 6.93     Results from last 7 days   Lab Units 06/19/24  0503 06/18/24  0945   SODIUM mmol/L 140 137   POTASSIUM mmol/L 3.5 3.4*   CHLORIDE mmol/L 107 100   CO2 mmol/L 25 28   ANION GAP mmol/L 8 9   BUN mg/dL 13 15   CREATININE mg/dL 0.86 0.90   EGFR ml/min/1.73sq m 91 89   CALCIUM mg/dL 9.2 9.2     Results from last 7 days   Lab Units 06/18/24  0945   AST U/L 22   ALT U/L 27   ALK PHOS U/L 83   TOTAL PROTEIN g/dL 7.3   ALBUMIN g/dL 4.7   TOTAL BILIRUBIN mg/dL 0.78     Results from last 7 days   Lab Units 06/19/24  0503 06/18/24  0945   GLUCOSE RANDOM mg/dL 123 130     Results from last 7 days   Lab Units 06/18/24  1302 06/18/24  1113 06/18/24  0945   HS TNI 0HR ng/L  --   --  7   HS TNI 2HR ng/L  --  6  --    HSTNI D2 ng/L  --  -1  --    HS TNI 4HR ng/L 5  --   --    HSTNI D4 ng/L -2  --   --      Results from last 7 days   Lab Units 06/19/24  0503   PROCALCITONIN ng/ml 0.07     Results from last 7 days   Lab Units 06/18/24  0945   BNP pg/mL 16     Results from last 7 days   Lab Units 06/18/24  0945   INFLUENZA A PCR  Negative   INFLUENZA B PCR  Negative   RSV PCR  Negative         ED Treatment-Medication Administration from 06/18/2024 0925 to 06/18/2024 1329         Date/Time Order Dose Route Action     06/18/2024 0956 albuterol inhalation solution 5 mg 5 mg Nebulization Given     06/18/2024 0956 ipratropium (ATROVENT) 0.02 % inhalation solution 0.5 mg 0.5 mg Nebulization Given     06/18/2024 0954 predniSONE tablet 50 mg 50 mg Oral Given     06/18/2024 1118 albuterol inhalation solution 10 mg 10 mg Nebulization Given     06/18/2024 1118 ipratropium (ATROVENT) 0.02 % inhalation solution 1 mg 1 mg Nebulization Given     06/18/2024 1118 sodium chloride 0.9 % inhalation solution 12 mL 12 mL  Nebulization Given     06/18/2024 1107 magnesium sulfate 2 g/50 mL IVPB (premix) 2 g 2 g Intravenous New Bag     06/18/2024 1106 potassium chloride (Klor-Con M20) CR tablet 40 mEq 40 mEq Oral Given     06/18/2024 1304 azithromycin (ZITHROMAX) 500 mg in sodium chloride 0.9% 250mL IVPB 500 mg 500 mg Intravenous New Bag            Past Medical History:   Diagnosis Date    Deafness in right ear     Hypertension      Present on Admission:   Schizophrenia (HCC)   Leukocytosis      Admitting Diagnosis: Angioma [D18.00]  SOB (shortness of breath) [R06.02]  Bronchitis [J40]  COPD exacerbation ([facility], [facility] within [facility]) [J44.1]  Age/Sex: 63 y.o. male  Admission Orders:  Scheduled Medications:  azithromycin, 500 mg, Oral, Q24H  enoxaparin, 40 mg, Subcutaneous, Daily  guaiFENesin, 600 mg, Oral, BID  ipratropium, 0.5 mg, Nebulization, TID  levalbuterol, 1.25 mg, Nebulization, TID  predniSONE, 40 mg, Oral, Daily      Continuous IV Infusions:     PRN Meds:  albuterol, 1 puff, Inhalation, Q6H PRN    Bilateral SCDs  Oxygen as needed to keep sats > 90%    IP CONSULT TO PULMONOLOGY    Network Utilization Review Department  ATTENTION: Please call with any questions or concerns to [phone] and carefully listen to the prompts so that you are directed to the right person. All voicemails are confidential.   For Discharge needs, contact Care Management DC Support Team at [phone] opt. 2  Send all requests for admission clinical reviews, approved or denied determinations and any other requests to dedicated fax number below belonging to the campus where the patient is receiving treatment. List of dedicated fax numbers for the Facilities:  FACILITY NAME UR FAX NUMBER   ADMISSION DENIALS (Administrative/Medical Necessity) [phone]   DISCHARGE SUPPORT TEAM (NETWORK) [phone]   PARENT CHILD HEALTH (Maternity/NICU/Pediatrics) [phone]   [facility] [phone]   [facility] [phone]   [facility]  [facility] [phone]   [facility] [phone]   [facility] [phone]   General acute hospital [phone]   [facility] [phone]   [facility] [phone]   [facility] [phone]   [facility] [phone]   [facility] [phone]   [facility] [phone]

## 2024-06-19 NOTE — ASSESSMENT & PLAN NOTE
Recent Labs     06/18/24  0945 06/19/24  0503   WBC 10.34* 11.45*     Mild leukocytosis  Does not meet sepsis criteria.  Worsened secondary to steroids used  Monitor

## 2024-06-19 NOTE — CONSULTS
Consultation - Pulmonary Medicine   Albert Jensen 63 y.o. male MRN: [id number]  Unit/Bed#: -01 Encounter: [id number]      Assessment/Plan:    Acute respiratory insufficiency  -96% on room air, patient does not wear any supplemental O2 at home  -Continue to maintain saturation greater than 89%  -Pulmonary hygiene encourage: Deep breathing with cough, OOB as tolerated, incentive spirometry and flutter valve    Shortness of breath-improved  -Likely secondary to poor living conditions, as patient states he has exposure to dust bugs and black mold  -Currently on room air walking around room with no shortness of breath    Reactive airway disease with suspected exacerbation  -History of peripheral eosinophilia 906 - 611 - 41 - 392  -No formal PFT for testing on file  -Azithromycin day #2/3  -Discharged with 40 mg x 5 days  -Will start Breo inpatient, discharge with medication   -Continue nebulizers while inpatient  -Will need outpatient follow-up and PFTs    History of Present Illness   Physician Requesting Consult: Dianne Louise MD  Reason for Consult / Principal Problem: Shortness of breath and reactive airway disease  Hx and PE limited by: Schizophrenia  Chief Complaint: Shortness of breath  HPI: Albert Jensen is a 63 y.o.  male who presented to [facility] with complaints of shortness of breath.  Patient was seen in urgent care a week ago and started on steroids and antibiotics and given albuterol inhaler.  He was unable to obtain steroids due to expense.  Symptoms did not improve and patient returned to emergency room with worsening shortness of breath  Pulmonary was consulted for shortness of breath and reactive airway disease.  Patient seen and examined at bedside.  Found walking around room.  Does not appear in any respiratory distress.  Saturations stable on room air.  No expiratory wheezes present on auscultation.  Patient has history of difficulty obtaining medications.  Consider  reaching out to case management to facilitate help with discharge medications  From pulmonary standpoint patient does not follow outpatient with pulmonary at this time  He has smoking history significant for 5-pack-year history, but quit many years ago as per patient.  He does not endorse living conditions being less than optimal, with dust, bugs and black mold where he lives.  He is working towards moving due to recent exacerbations and difficulty with his breathing.    Inpatient consult to Pulmonology  Consult performed by: BRENTON Nguyen  Consult ordered by: Dianne Louise MD        Review of Systems   Respiratory:  Positive for cough and shortness of breath.        Historical Information   Past Medical History:   Diagnosis Date    Deafness in right ear     Hypertension      Past Surgical History:   Procedure Laterality Date    SPLENECTOMY, TOTAL       Social History   Social History     Substance and Sexual Activity   Alcohol Use Not Currently     Social History     Substance and Sexual Activity   Drug Use Not Currently     Social History     Tobacco Use   Smoking Status Former    Current packs/day: 0.00    Types: Cigarettes    Quit date: 1987    Years since quittin.7   Smokeless Tobacco Never     E-Cigarette/Vaping    E-Cigarette Use Never User      E-Cigarette/Vaping Substances    Nicotine No     THC No     CBD No     Flavoring No     Other No     Unknown No      Occupational History: unknown    Family History: History reviewed. No pertinent family history.    Meds/Allergies   current meds:   Current Facility-Administered Medications   Medication Dose Route Frequency    albuterol (PROVENTIL HFA,VENTOLIN HFA) inhaler 1 puff  1 puff Inhalation Q6H PRN    azithromycin (ZITHROMAX) tablet 500 mg  500 mg Oral Q24H    enoxaparin (LOVENOX) subcutaneous injection 40 mg  40 mg Subcutaneous Daily    guaiFENesin (MUCINEX) 12 hr tablet 600 mg  600 mg Oral BID    ipratropium (ATROVENT) 0.02 %  "inhalation solution 0.5 mg  0.5 mg Nebulization TID    levalbuterol (XOPENEX) inhalation solution 1.25 mg  1.25 mg Nebulization TID    predniSONE tablet 40 mg  40 mg Oral Daily       No Known Allergies    Objective   Vitals: Blood pressure 166/89, pulse 68, temperature 97.7 °F (36.5 °C), resp. rate 18, height 5' 7\" (1.702 m), weight 95.3 kg (210 lb 1.6 oz), SpO2 96%.RA,Body mass index is 32.91 kg/m².    Intake/Output Summary (Last 24 hours) at 6/19/2024 0941  Last data filed at 6/19/2024 0201  Gross per 24 hour   Intake 480 ml   Output 400 ml   Net 80 ml     Invasive Devices       Peripheral Intravenous Line  Duration             Peripheral IV 06/18/24 Left Antecubital <1 day                    Physical Exam  Vitals reviewed.   Constitutional:       General: He is not in acute distress.     Appearance: He is obese. He is not ill-appearing.   HENT:      Head: Normocephalic and atraumatic.      Right Ear: External ear normal.      Left Ear: External ear normal.      Nose: Nose normal.      Mouth/Throat:      Mouth: Mucous membranes are moist.      Pharynx: Oropharynx is clear.   Eyes:      Extraocular Movements: Extraocular movements intact.      Pupils: Pupils are equal, round, and reactive to light.   Cardiovascular:      Rate and Rhythm: Normal rate and regular rhythm.      Pulses: Normal pulses.      Heart sounds: Normal heart sounds.   Pulmonary:      Effort: Pulmonary effort is normal.      Breath sounds: No wheezing or rhonchi.      Comments: Decreased lungs sounds  Abdominal:      General: Abdomen is flat.      Tenderness: There is no abdominal tenderness.   Musculoskeletal:         General: Normal range of motion.      Cervical back: Normal range of motion and neck supple.      Right lower leg: No edema.      Left lower leg: No edema.   Skin:     General: Skin is warm and dry.   Neurological:      Mental Status: He is alert and oriented to person, place, and time.   Psychiatric:         Mood and Affect: Mood " "normal.         Behavior: Behavior normal.         Thought Content: Thought content normal.         Judgment: Judgment normal.         Lab Results: I have personally reviewed pertinent lab results., ABG: No results found for: \"PHART\", \"EAS5QAC\", \"PO2ART\", \"ATU0TGN\", \"W3GDCYAX\", \"BEART\", \"SOURCE\", BNP:   Lab Results   Component Value Date    BNP 16 06/18/2024   , CBC:   Lab Results   Component Value Date    WBC 11.45 (H) 06/19/2024    HGB 14.7 06/19/2024    HCT 43.3 06/19/2024    MCV 94 06/19/2024     06/19/2024    RBC 4.60 06/19/2024    MCH 32.0 06/19/2024    MCHC 33.9 06/19/2024    RDW 13.3 06/19/2024    MPV 10.4 06/19/2024    NRBC 0 06/19/2024   , CMP:   Lab Results   Component Value Date    SODIUM 140 06/19/2024    K 3.5 06/19/2024     06/19/2024    CO2 25 06/19/2024    BUN 13 06/19/2024    CREATININE 0.86 06/19/2024    CALCIUM 9.2 06/19/2024    AST 22 06/18/2024    ALT 27 06/18/2024    ALKPHOS 83 06/18/2024    EGFR 91 06/19/2024       Flu/COVID/RSV PCR: All negative    BNP: 16    Imaging Studies: I have personally reviewed pertinent reports.     Chest x-ray 06/18/2024 shows clear lungs no pneumothorax or pleural effusion    EKG, Pathology, and Other Studies: I have personally reviewed pertinent reports.     EKG    Pulmonary Results (PFTs, PSG): I have personally reviewed pertinent reports.     No formal PFTs on file    Code Status: Level 1 - Full Code    Portions of the record may have been created with voice recognition software.  Occasional wrong word or \"sound a like\" substitutions may have occurred due to the inherent limitations of voice recognition software.  Read the chart carefully and recognize, using context, where substitutions have occurred.  "

## 2024-06-19 NOTE — PLAN OF CARE
Problem: PAIN - ADULT  Goal: Verbalizes/displays adequate comfort level or baseline comfort level  Description: Interventions:  - Encourage patient to monitor pain and request assistance  - Assess pain using appropriate pain scale  - Administer analgesics based on type and severity of pain and evaluate response  - Implement non-pharmacological measures as appropriate and evaluate response  - Consider cultural and social influences on pain and pain management  - Notify physician/advanced practitioner if interventions unsuccessful or patient reports new pain  Outcome: Progressing     Problem: INFECTION - ADULT  Goal: Absence or prevention of progression during hospitalization  Description: INTERVENTIONS:  - Assess and monitor for signs and symptoms of infection  - Monitor lab/diagnostic results  - Monitor all insertion sites, i.e. indwelling lines, tubes, and drains  - Monitor endotracheal if appropriate and nasal secretions for changes in amount and color  - [place] appropriate cooling/warming therapies per order  - Administer medications as ordered  - Instruct and encourage patient and family to use good hand hygiene technique  - Identify and instruct in appropriate isolation precautions for identified infection/condition  Outcome: Progressing  Goal: Absence of fever/infection during neutropenic period  Description: INTERVENTIONS:  - Monitor WBC    Outcome: Progressing     Problem: SAFETY ADULT  Goal: Patient will remain free of falls  Description: INTERVENTIONS:  - Educate patient/family on patient safety including physical limitations  - Instruct patient to call for assistance with activity   - Consult OT/PT to assist with strengthening/mobility   - Keep Call bell within reach  - Keep bed low and locked with side rails adjusted as appropriate  - Keep care items and personal belongings within reach  - Initiate and maintain comfort rounds  - Make Fall Risk Sign visible to staff  - Offer Toileting every 2 Hours,  in advance of need  - Initiate/Maintain alarm  - Obtain necessary fall risk management equipment  - Apply yellow socks and bracelet for high fall risk patients  - Consider moving patient to room near nurses station  Outcome: Progressing  Goal: Maintain or return to baseline ADL function  Description: INTERVENTIONS:  -  Assess patient's ability to carry out ADLs; assess patient's baseline for ADL function and identify physical deficits which impact ability to perform ADLs (bathing, care of mouth/teeth, toileting, grooming, dressing, etc.)  - Assess/evaluate cause of self-care deficits   - Assess range of motion  - Assess patient's mobility; develop plan if impaired  - Assess patient's need for assistive devices and provide as appropriate  - Encourage maximum independence but intervene and supervise when necessary  - Involve family in performance of ADLs  - Assess for home care needs following discharge   - Consider OT consult to assist with ADL evaluation and planning for discharge  - Provide patient education as appropriate  Outcome: Progressing  Goal: Maintains/Returns to pre admission functional level  Description: INTERVENTIONS:  - Perform AM-PAC 6 Click Basic Mobility/ Daily Activity assessment daily.  - Set and communicate daily mobility goal to care team and patient/family/caregiver.   - Collaborate with rehabilitation services on mobility goals if consulted  - Perform Range of Motion 4 times a day.  - Reposition patient every 2 hours.  - Dangle patient 3 times a day  - Stand patient 3 times a day  - Ambulate patient 3 times a day  - Out of bed to chair 3 times a day   - Out of bed for meals 3 times a day  - Out of bed for toileting  - Record patient progress and toleration of activity level   Outcome: Progressing     Problem: DISCHARGE PLANNING  Goal: Discharge to home or other facility with appropriate resources  Description: INTERVENTIONS:  - Identify barriers to discharge w/patient and caregiver  - Arrange  for needed discharge resources and transportation as appropriate  - Identify discharge learning needs (meds, wound care, etc.)  - Arrange for interpretive services to assist at discharge as needed  - Refer to Case Management Department for coordinating discharge planning if the patient needs post-hospital services based on physician/advanced practitioner order or complex needs related to functional status, cognitive ability, or social support system  Outcome: Progressing     Problem: Knowledge Deficit  Goal: Patient/family/caregiver demonstrates understanding of disease process, treatment plan, medications, and discharge instructions  Description: Complete learning assessment and assess knowledge base.  Interventions:  - Provide teaching at level of understanding  - Provide teaching via preferred learning methods  Outcome: Progressing

## 2024-06-19 NOTE — ASSESSMENT & PLAN NOTE
Presented with shortness of breath, productive cough for about a week and a half.  Was seen in urgent care a week ago and was started on steroids, antibiotics and albuterol as needed.  Patient reported he did not take steroids because they were expensive.  He took his albuterol as needed and the antibiotics however did not have much improvement.  On presentation patient had stable vital signs, at room air.  Chest x-ray normal however patient with wheezing on physical exam.  BNP normal, troponin is normal.  COVID/flu/RSV normal  Patient does not have history of lung disease however he had similar presentations in the past and was treated with steroids, antibiotics and discharged.  Patient did not follow with providers outpatient.  Patient denies current smoking, reported he smoked for a very short period of time when he was young and he was passive smoker in his childhood.  Unclear if the patient has any underlying lung disease however his presentation is similar with a COPD exacerbation  Patient reported some improvement.  Still with intermittent wheezing on auscultation    Plan:  Continue prednisone 40 mg daily   Pulmonology consulted.  Appreciate recommendations.  Per pulmonology started Breo and albuterol.  Patient will be discharged on Breo and albuterol   Continue azithromycin  DuoNebs  Mucinex

## 2024-06-20 VITALS
BODY MASS INDEX: 32.98 KG/M2 | HEIGHT: 67 IN | HEART RATE: 81 BPM | WEIGHT: 210.1 LBS | TEMPERATURE: 97.9 F | SYSTOLIC BLOOD PRESSURE: 160 MMHG | OXYGEN SATURATION: 95 % | RESPIRATION RATE: 20 BRPM | DIASTOLIC BLOOD PRESSURE: 84 MMHG

## 2024-06-20 PROBLEM — L71.9 ROSACEA: Status: ACTIVE | Noted: 2024-06-20

## 2024-06-20 PROBLEM — D72.829 LEUKOCYTOSIS: Status: RESOLVED | Noted: 2021-09-24 | Resolved: 2024-06-20

## 2024-06-20 LAB
ANION GAP SERPL CALCULATED.3IONS-SCNC: 6 MMOL/L (ref 4–13)
BUN SERPL-MCNC: 11 MG/DL (ref 5–25)
CALCIUM SERPL-MCNC: 8.8 MG/DL (ref 8.4–10.2)
CHLORIDE SERPL-SCNC: 107 MMOL/L (ref 96–108)
CO2 SERPL-SCNC: 26 MMOL/L (ref 21–32)
CREAT SERPL-MCNC: 0.88 MG/DL (ref 0.6–1.3)
ERYTHROCYTE [DISTWIDTH] IN BLOOD BY AUTOMATED COUNT: 13.4 % (ref 11.6–15.1)
GFR SERPL CREATININE-BSD FRML MDRD: 90 ML/MIN/1.73SQ M
GLUCOSE SERPL-MCNC: 126 MG/DL (ref 65–140)
HCT VFR BLD AUTO: 40.1 % (ref 36.5–49.3)
HGB BLD-MCNC: 13.2 G/DL (ref 12–17)
MCH RBC QN AUTO: 31.2 PG (ref 26.8–34.3)
MCHC RBC AUTO-ENTMCNC: 32.9 G/DL (ref 31.4–37.4)
MCV RBC AUTO: 95 FL (ref 82–98)
PLATELET # BLD AUTO: 316 THOUSANDS/UL (ref 149–390)
PMV BLD AUTO: 10.5 FL (ref 8.9–12.7)
POTASSIUM SERPL-SCNC: 3.6 MMOL/L (ref 3.5–5.3)
RBC # BLD AUTO: 4.23 MILLION/UL (ref 3.88–5.62)
SODIUM SERPL-SCNC: 139 MMOL/L (ref 135–147)
WBC # BLD AUTO: 9.54 THOUSAND/UL (ref 4.31–10.16)

## 2024-06-20 PROCEDURE — 99024 POSTOP FOLLOW-UP VISIT: CPT | Performed by: SURGERY

## 2024-06-20 PROCEDURE — 99232 SBSQ HOSP IP/OBS MODERATE 35: CPT | Performed by: INTERNAL MEDICINE

## 2024-06-20 PROCEDURE — NC001 PR NO CHARGE: Performed by: PHYSICIAN ASSISTANT

## 2024-06-20 PROCEDURE — 94640 AIRWAY INHALATION TREATMENT: CPT

## 2024-06-20 PROCEDURE — 99239 HOSP IP/OBS DSCHRG MGMT >30: CPT | Performed by: INTERNAL MEDICINE

## 2024-06-20 PROCEDURE — 94760 N-INVAS EAR/PLS OXIMETRY 1: CPT

## 2024-06-20 PROCEDURE — 80048 BASIC METABOLIC PNL TOTAL CA: CPT | Performed by: INTERNAL MEDICINE

## 2024-06-20 PROCEDURE — 85027 COMPLETE CBC AUTOMATED: CPT | Performed by: INTERNAL MEDICINE

## 2024-06-20 RX ORDER — FLUTICASONE FUROATE AND VILANTEROL 100; 25 UG/1; UG/1
1 POWDER RESPIRATORY (INHALATION) DAILY
Qty: 60 BLISTER | Refills: 0 | Status: SHIPPED | OUTPATIENT
Start: 2024-06-21

## 2024-06-20 RX ORDER — AZITHROMYCIN 500 MG/1
500 TABLET, FILM COATED ORAL EVERY 24 HOURS
Qty: 1 TABLET | Refills: 0 | Status: SHIPPED | OUTPATIENT
Start: 2024-06-20 | End: 2024-06-21

## 2024-06-20 RX ORDER — METHYLPREDNISOLONE 4 MG/1
TABLET ORAL
Qty: 21 TABLET | Refills: 0 | Status: SHIPPED | OUTPATIENT
Start: 2024-06-20

## 2024-06-20 RX ORDER — GUAIFENESIN 600 MG/1
600 TABLET, EXTENDED RELEASE ORAL 2 TIMES DAILY
Qty: 14 TABLET | Refills: 0 | Status: SHIPPED | OUTPATIENT
Start: 2024-06-20

## 2024-06-20 RX ORDER — AZITHROMYCIN 500 MG/1
500 TABLET, FILM COATED ORAL EVERY 24 HOURS
Qty: 1 TABLET | Refills: 0 | Status: SHIPPED | OUTPATIENT
Start: 2024-06-21 | End: 2024-06-20

## 2024-06-20 RX ORDER — ALBUTEROL SULFATE 90 UG/1
1-2 AEROSOL, METERED RESPIRATORY (INHALATION) EVERY 6 HOURS PRN
Qty: 18 G | Refills: 0 | Status: SHIPPED | OUTPATIENT
Start: 2024-06-20 | End: 2024-06-27

## 2024-06-20 RX ADMIN — LEVALBUTEROL HYDROCHLORIDE 1.25 MG: 1.25 SOLUTION RESPIRATORY (INHALATION) at 14:38

## 2024-06-20 RX ADMIN — LEVALBUTEROL HYDROCHLORIDE 1.25 MG: 1.25 SOLUTION RESPIRATORY (INHALATION) at 07:22

## 2024-06-20 RX ADMIN — IPRATROPIUM BROMIDE 0.5 MG: 0.5 SOLUTION RESPIRATORY (INHALATION) at 14:38

## 2024-06-20 RX ADMIN — IPRATROPIUM BROMIDE 0.5 MG: 0.5 SOLUTION RESPIRATORY (INHALATION) at 07:22

## 2024-06-20 NOTE — DISCHARGE SUMMARY
Formerly [facility]  Discharge- Albert Jensen 1960, 63 y.o. male MRN: [id number]  Unit/Bed#: [facility]01 Encounter: [id number]  Primary Care Provider: Jasson Warner DO   Date and time admitted to hospital: 6/18/2024  9:27 AM    * Shortness of breath  Assessment & Plan  Presented with shortness of breath, productive cough for about a week and a half.  Was seen in urgent care a week ago and was started on steroids, antibiotics and albuterol as needed.  Patient reported he did not take steroids because they were expensive.  He took his albuterol as needed and the antibiotics however did not have much improvement.  On presentation patient had stable vital signs, at room air.  Chest x-ray normal however patient with wheezing on physical exam.  BNP normal, troponin is normal.  COVID/flu/RSV normal  Patient does not have history of lung disease however he had similar presentations in the past and was treated with steroids, antibiotics and discharged.  Patient did not follow with providers outpatient.  Patient denies current smoking, reported he smoked for a very short period of time when he was young and he was passive smoker in his childhood.  Unclear if the patient has any underlying lung disease however his presentation is similar with a COPD exacerbation  Patient reported improvement.        had long discussion with the patient regarding steroids, antibiotics and inhalers.  Patient requested antibiotics to be ordered on discharge to have on hand, I did explain to him that he already completed treatment with antibiotics prior he was admitted he had antibiotics during hospitalization.  No reason for continuing antibiotics.  He verbalized understanding however stated that he will not take prednisone as he wants to be discharged on Medrol pack.  Recommended to take the inhalers as were ordered however he does not want to take those for a long time.  He insisted that we will need to find a new room  for him because the environment where he is right now it is not good for his lung problem.  He reported that the room where he lives currently has dust and mold.  Advised him to discuss this problem with his landlord and try to get a different place to live in.  Unfortunately we cannot help.  Patient did not want to be discharged however he eventually agreed.  Patient stated that if he cannot find another place/room to live in, he will come back to emergency department.     Patient stable for discharge.  Discharged on Medrol pack and inhalers.    Skin tumor  Assessment & Plan  Rounded, about 3 cm skin tumor on his back that is bleeding   Had the skin tumor removed by surgery.   Follow-up in 2 weeks to discuss pathology reports.      Elevated blood pressure reading  Assessment & Plan  Per chart review patient has history of hypertension however patient stated he does not have hypertension  Not on any blood pressure medication  Improved    Schizophrenia (HCC)  Assessment & Plan  Per chart review  Patient currently stable, not on any medication, does not follow with providers      Leukocytosis-resolved as of 6/20/2024  Assessment & Plan  Recent Labs     06/18/24  0945 06/19/24  0503 06/20/24  0456   WBC 10.34* 11.45* 9.54       Mild leukocytosis  Does not meet sepsis criteria.  Worsened secondary to steroids used  Monitor        Medical Problems       Resolved Problems  Date Reviewed: 4/26/2022            Resolved    Leukocytosis 6/20/2024     Resolved by  Dianne Louise MD        Discharging Physician / Practitioner: Dianne Louise MD  PCP: Jasson Warner DO  Admission Date:   Admission Orders (From admission, onward)       Ordered        06/19/24 1358  INPATIENT ADMISSION  Once            06/18/24 1258  Place in Observation  Once                          Discharge Date: 06/20/24    Consultations During Hospital Stay:  Surgery, pulmonology    Procedures Performed:   Skin tumor excision     Significant  "Findings / Test Results:   XR chest 1 view portable  Result Date: 6/18/2024  Impression: No acute cardiopulmonary disease.     Incidental Findings:   none     Test Results Pending at Discharge (will require follow up):   Pathology report      Outpatient Tests Requested:  none    Complications:  none    Reason for Admission: Shortness of breath    Hospital Course:   Albert Jensen is a 63 y.o. male patient with schizophrenia who originally presented to the hospital on 6/18/2024 due to shortness of breath.  Patient was started on oral steroids and inhalers with improvement.    Patient stable for discharge.  Discharged on Medrol pack per his request and inhalers.?  If the patient will be compliant.  Recommended to follow-up with family doctor.    Patient also had a skin tumor on his back that was bleeding, surgery did excision.  He will need to follow-up with surgery outpatient for pathology report.        Please see above list of diagnoses and related plan for additional information.     Condition at Discharge: good    Discharge Day Visit / Exam:   Subjective:  \"some \" improvement   Vitals: Blood Pressure: 160/84 (06/20/24 1520)  Pulse: 81 (06/20/24 1520)  Temperature: 97.9 °F (36.6 °C) (06/20/24 1520)  Temp Source: Temporal (06/20/24 1520)  Respirations: 20 (06/20/24 0737)  Height: 5' 7\" (170.2 cm) (06/18/24 2000)  Weight - Scale: 95.3 kg (210 lb 1.6 oz) (06/18/24 2000)  SpO2: 95 % (06/20/24 1520)  Exam:   Physical Exam  Vitals and nursing note reviewed.   Constitutional:       General: He is not in acute distress.     Appearance: He is not diaphoretic.   HENT:      Head: Normocephalic.   Eyes:      General: No scleral icterus.        Right eye: No discharge.         Left eye: No discharge.   Cardiovascular:      Rate and Rhythm: Normal rate and regular rhythm.      Heart sounds: No murmur heard.  Pulmonary:      Effort: Pulmonary effort is normal. No respiratory distress.      Breath sounds: Normal breath sounds. No " wheezing, rhonchi or rales.   Abdominal:      General: There is no distension.      Palpations: Abdomen is soft.      Tenderness: There is no abdominal tenderness. There is no guarding or rebound.   Musculoskeletal:      Cervical back: Normal range of motion.      Right lower leg: No edema.      Left lower leg: No edema.   Skin:     General: Skin is warm.   Neurological:      Mental Status: He is alert and oriented to person, place, and time.   Psychiatric:         Mood and Affect: Mood normal.         Behavior: Behavior normal.          Discussion with Family: Attempted to update  (sister, twice) via phone. Left voicemail.     Discharge instructions/Information to patient and family:   See after visit summary for information provided to patient and family.      Provisions for Follow-Up Care:  See after visit summary for information related to follow-up care and any pertinent home health orders.      Mobility at time of Discharge:   Basic Mobility Inpatient Raw Score: 23  JH-HLM Goal: 7: Walk 25 feet or more  JH-HLM Achieved: 7: Walk 25 feet or more  HLM Goal achieved. Continue to encourage appropriate mobility.     Disposition:   Home    Planned Readmission: no     Discharge Statement:  I spent 40 minutes discharging the patient. This time was spent on the day of discharge. I had direct contact with the patient on the day of discharge. Greater than 50% of the total time was spent examining patient, answering all patient questions, arranging and discussing plan of care with patient as well as directly providing post-discharge instructions.  Additional time then spent on discharge activities.    Discharge Medications:  See after visit summary for reconciled discharge medications provided to patient and/or family.      **Please Note: This note may have been constructed using a voice recognition system**       have been constructed using a voice recognition system**

## 2024-06-20 NOTE — PROGRESS NOTES
"Progress Note - Pulmonary   Albert Jensen 63 y.o. male MRN: [id number]  Unit/Bed#: -01 Encounter: [id number]    Assessment & Plan:    Acute respiratory insuffiency  Shortness of breath, improved  Reactive airway disease with suspected exacerbation    Patient is currently stable on room air  History of mild eosinophilia (420, 790, 50, 330)  Complete azithromycin today for day 3/3  Continue prednisone 40mg for 5 day course, day 2/5. He is however adamant on taking methyprednisone dose pack, so he can be switched to this upon discharge  Continue Breo inhaler, atrovent/xopenex nebs TID  He should be discharged on Breo 1 puff daily as well as albuterol inhaler. He feel strongly about not wanting to take a maintenance inhaler and admits intended noncompliance. We discussed the risks and benefits and that based on his symptoms, I do continue to recommend this to prevent further exacerbations - especially given his continued poor living conditions with exposures to dust, bugs and black mold   He will need outpatient pulmonary follow up with PFTs    Subjective:   Patient is pacing about the room. He continues to have some intermittent shortness of breath and productive cough. He feels agitated this morning about his medications - inconsistent from what he takes from his PCP.     Objective:     Vitals: Blood pressure 146/83, pulse 70, temperature 98.4 °F (36.9 °C), temperature source Temporal, resp. rate 20, height 5' 7\" (1.702 m), weight 95.3 kg (210 lb 1.6 oz), SpO2 95%.,Body mass index is 32.91 kg/m².      Intake/Output Summary (Last 24 hours) at 6/20/2024 0826  Last data filed at 6/20/2024 0201  Gross per 24 hour   Intake 2040 ml   Output 350 ml   Net 1690 ml       Invasive Devices       Peripheral Intravenous Line  Duration             Peripheral IV 06/18/24 Left Antecubital 1 day                    Physical Exam:   General appearance:   Alert and awake, in no acute distress  Head:   Normocephalic, without obvious " abnormality, atraumatic  Eyes:   No scleral icterus   Lungs:  Pulmonary effort non labored at rest  Breath sounds: Bilateral scattered rhonchi. No wheezes.  Cardiovascular:   Regular rate and rhythm. No murmurs. Capillary refill < 3 seconds.  Abdomen:    No appreciable distension or tenderness  Extremities:   No deformity. No clubbing present. No edema to extremities.   Skin:   Warm and dry. Intact. Color appropriate for ethnicity.  Neurologic:   No acute focal deficits are noted.  Psychiatric:   Normal mood. Answers questions appropriately.        Labs: I have personally reviewed pertinent lab results., CBC:   Lab Results   Component Value Date    WBC 9.54 06/20/2024    HGB 13.2 06/20/2024    HCT 40.1 06/20/2024    MCV 95 06/20/2024     06/20/2024    RBC 4.23 06/20/2024    MCH 31.2 06/20/2024    MCHC 32.9 06/20/2024    RDW 13.4 06/20/2024    MPV 10.5 06/20/2024   , CMP:   Lab Results   Component Value Date    SODIUM 139 06/20/2024    K 3.6 06/20/2024     06/20/2024    CO2 26 06/20/2024    BUN 11 06/20/2024    CREATININE 0.88 06/20/2024    CALCIUM 8.8 06/20/2024    EGFR 90 06/20/2024     Imaging and other studies: I have personally reviewed pertinent reports.      XR chest portable, 6/18/2024  No acute cardiopulmonary disease.        Stacia Werner, MSN RN FNP-BC  Nurse Practitioner  [facility]

## 2024-06-20 NOTE — ASSESSMENT & PLAN NOTE
Recent Labs     06/18/24  0945 06/19/24  0503 06/20/24  0456   WBC 10.34* 11.45* 9.54       Mild leukocytosis  Does not meet sepsis criteria.  Worsened secondary to steroids used  Monitor

## 2024-06-20 NOTE — PROGRESS NOTES
"Progress Note - Albert Jensen 63 y.o. male MRN: [id number]    Unit/Bed#: -01 Encounter: [id number]      Assessment/Plan:  64 y/o M with PMHx schizophrenia admitted with acute respiratory insufficiency with finding of small bleeding skin mass of left mid back.   POD#1 s/p excision back lesion     Skin lesion  -likely basal or squamous cell skin cancer  -present since 2011 per patient     Pain control as needed, use Tylenol, will add low dose Oxy  Apply ice to site   Monitor incision site.  Keep clean and dry.  Follow pathology  Did discuss that if margins are positive patient would require further excision from this site.  Outpatient follow-up in 2 weeks after discharge to remove sutures and review pathology  Will sign off.        Respiratory insuffiency/SOB  -active wheezing  -former tobacco  Medical mangement        Schizophrenia  -per chart review, not on meds       Subjective:   I feel fine.     Objective:     Vitals: Blood pressure 146/83, pulse 70, temperature 98.4 °F (36.9 °C), temperature source Temporal, resp. rate 20, height 5' 7\" (1.702 m), weight 95.3 kg (210 lb 1.6 oz), SpO2 95%.,Body mass index is 32.91 kg/m².      Intake/Output Summary (Last 24 hours) at 6/20/2024 1007  Last data filed at 6/20/2024 0937  Gross per 24 hour   Intake 1980 ml   Output 350 ml   Net 1630 ml       Physical Exam: General appearance: alert and oriented, in no acute distress  Incision is clean with mild drainage noted. Dressing changed.       Invasive Devices       Peripheral Intravenous Line  Duration             Peripheral IV 06/18/24 Left Antecubital 2 days                    Lab, Imaging and other studies: I have personally reviewed pertinent reports.    VTE Pharmacologic Prophylaxis: Enoxaparin (Lovenox)  VTE Mechanical Prophylaxis: sequential compression device   "

## 2024-06-20 NOTE — ASSESSMENT & PLAN NOTE
Rounded, about 3 cm skin tumor on his back that is bleeding   Had the skin tumor removed by surgery.   Follow-up in 2 weeks to discuss pathology reports.

## 2024-06-20 NOTE — PLAN OF CARE
Problem: PAIN - ADULT  Goal: Verbalizes/displays adequate comfort level or baseline comfort level  Description: Interventions:  - Encourage patient to monitor pain and request assistance  - Assess pain using appropriate pain scale  - Administer analgesics based on type and severity of pain and evaluate response  - Implement non-pharmacological measures as appropriate and evaluate response  - Consider cultural and social influences on pain and pain management  - Notify physician/advanced practitioner if interventions unsuccessful or patient reports new pain  6/19/2024 2205 by Laura Correa RN  Outcome: Progressing  6/19/2024 2205 by Laura Correa RN  Outcome: Progressing     Problem: INFECTION - ADULT  Goal: Absence or prevention of progression during hospitalization  Description: INTERVENTIONS:  - Assess and monitor for signs and symptoms of infection  - Monitor lab/diagnostic results  - Monitor all insertion sites, i.e. indwelling lines, tubes, and drains  - Monitor endotracheal if appropriate and nasal secretions for changes in amount and color  - Boulevard appropriate cooling/warming therapies per order  - Administer medications as ordered  - Instruct and encourage patient and family to use good hand hygiene technique  - Identify and instruct in appropriate isolation precautions for identified infection/condition  6/19/2024 2205 by Laura Correa RN  Outcome: Progressing  6/19/2024 2205 by Luara Correa RN  Outcome: Progressing  Goal: Absence of fever/infection during neutropenic period  Description: INTERVENTIONS:  - Monitor WBC    6/19/2024 2205 by Laura Correa RN  Outcome: Progressing  6/19/2024 2205 by Laura Correa RN  Outcome: Progressing     Problem: SAFETY ADULT  Goal: Patient will remain free of falls  Description: INTERVENTIONS:  - Educate patient/family on patient safety including physical limitations  - Instruct patient to call for assistance with activity   - Consult OT/PT to assist with  strengthening/mobility   - Keep Call bell within reach  - Keep bed low and locked with side rails adjusted as appropriate  - Keep care items and personal belongings within reach  - Initiate and maintain comfort rounds  - Make Fall Risk Sign visible to staff  - Offer Toileting every 2 Hours, in advance of need  - Initiate/Maintain alarm  - Obtain necessary fall risk management equipment  - Apply yellow socks and bracelet for high fall risk patients  - Consider moving patient to room near nurses station  6/19/2024 2205 by Laura Correa RN  Outcome: Progressing  6/19/2024 2205 by Laura Correa RN  Outcome: Progressing  Goal: Maintain or return to baseline ADL function  Description: INTERVENTIONS:  -  Assess patient's ability to carry out ADLs; assess patient's baseline for ADL function and identify physical deficits which impact ability to perform ADLs (bathing, care of mouth/teeth, toileting, grooming, dressing, etc.)  - Assess/evaluate cause of self-care deficits   - Assess range of motion  - Assess patient's mobility; develop plan if impaired  - Assess patient's need for assistive devices and provide as appropriate  - Encourage maximum independence but intervene and supervise when necessary  - Involve family in performance of ADLs  - Assess for home care needs following discharge   - Consider OT consult to assist with ADL evaluation and planning for discharge  - Provide patient education as appropriate  6/19/2024 2205 by Laura Correa RN  Outcome: Progressing  6/19/2024 2205 by Laura Correa RN  Outcome: Progressing  Goal: Maintains/Returns to pre admission functional level  Description: INTERVENTIONS:  - Perform AM-PAC 6 Click Basic Mobility/ Daily Activity assessment daily.  - Set and communicate daily mobility goal to care team and patient/family/caregiver.   - Collaborate with rehabilitation services on mobility goals if consulted  - Perform Range of Motion 4 times a day.  - Reposition patient every 2  hours.  - Dangle patient 3 times a day  - Stand patient 3 times a day  - Ambulate patient 3 times a day  - Out of bed to chair 3 times a day   - Out of bed for meals 3 times a day  - Out of bed for toileting  - Record patient progress and toleration of activity level   6/19/2024 2205 by Laura Correa RN  Outcome: Progressing  6/19/2024 2205 by Laura Correa RN  Outcome: Progressing     Problem: DISCHARGE PLANNING  Goal: Discharge to home or other facility with appropriate resources  Description: INTERVENTIONS:  - Identify barriers to discharge w/patient and caregiver  - Arrange for needed discharge resources and transportation as appropriate  - Identify discharge learning needs (meds, wound care, etc.)  - Arrange for interpretive services to assist at discharge as needed  - Refer to Case Management Department for coordinating discharge planning if the patient needs post-hospital services based on physician/advanced practitioner order or complex needs related to functional status, cognitive ability, or social support system  6/19/2024 2205 by Laura Correa RN  Outcome: Progressing  6/19/2024 2205 by Laura Correa RN  Outcome: Progressing     Problem: Knowledge Deficit  Goal: Patient/family/caregiver demonstrates understanding of disease process, treatment plan, medications, and discharge instructions  Description: Complete learning assessment and assess knowledge base.  Interventions:  - Provide teaching at level of understanding  - Provide teaching via preferred learning methods  6/19/2024 2205 by Laura Correa RN  Outcome: Progressing  6/19/2024 2205 by Laura Correa RN  Outcome: Progressing

## 2024-06-20 NOTE — ASSESSMENT & PLAN NOTE
Per chart review patient has history of hypertension however patient stated he does not have hypertension  Not on any blood pressure medication  Improved

## 2024-06-20 NOTE — ASSESSMENT & PLAN NOTE
Per chart review  Patient currently stable, not on any medication, does not follow with providers

## 2024-06-20 NOTE — ASSESSMENT & PLAN NOTE
Presented with shortness of breath, productive cough for about a week and a half.  Was seen in urgent care a week ago and was started on steroids, antibiotics and albuterol as needed.  Patient reported he did not take steroids because they were expensive.  He took his albuterol as needed and the antibiotics however did not have much improvement.  On presentation patient had stable vital signs, at room air.  Chest x-ray normal however patient with wheezing on physical exam.  BNP normal, troponin is normal.  COVID/flu/RSV normal  Patient does not have history of lung disease however he had similar presentations in the past and was treated with steroids, antibiotics and discharged.  Patient did not follow with providers outpatient.  Patient denies current smoking, reported he smoked for a very short period of time when he was young and he was passive smoker in his childhood.  Unclear if the patient has any underlying lung disease however his presentation is similar with a COPD exacerbation  Patient reported improvement.        had long discussion with the patient regarding steroids, antibiotics and inhalers.  Patient requested antibiotics to be ordered on discharge to have on hand, I did explain to him that he already completed treatment with antibiotics prior he was admitted he had antibiotics during hospitalization.  No reason for continuing antibiotics.  He verbalized understanding however stated that he will not take prednisone as he wants to be discharged on Medrol pack.  Recommended to take the inhalers as were ordered however he does not want to take those for a long time.  He insisted that we will need to find a new room for him because the environment where he is right now it is not good for his lung problem.  He reported that the room where he lives currently has dust and mold.  Advised him to discuss this problem with his landlord and try to get a different place to live in.  Unfortunately we cannot help.   Patient did not want to be discharged however he eventually agreed.  Patient stated that if he cannot find another place/room to live in, he will come back to emergency department.     Patient stable for discharge.  Discharged on Medrol pack and inhalers.

## 2024-06-21 NOTE — CASE MANAGEMENT
Case Management Progress Note    Patient name Albert Jensen  Location /-01 MRN [id number]  : 1960 Date 2024       LOS (days): 1  Geometric Mean LOS (GMLOS) (days):   Days to GMLOS:        OBJECTIVE:        Current admission status: Inpatient  Preferred Pharmacy:   [id number] [place] - [address]   [place] [id number]  Phone: [phone] Fax: [phone]    Primary Care Provider: Jasson Warner DO    Primary Insurance: [employer]  Secondary Insurance:     PROGRESS NOTE:  CM was requested to contact pt's sister. Pt discharged from the hospital yesterday. CM contacted pt's sister Jennifer who reported she was worried about pt's living situation as she reports pt struggles with hoarding. CM provided Jennifer with the number for [facility] to contact them and report his hoarding. Pt's sister reports she will do so and thanked this CM.

## 2024-06-23 ENCOUNTER — HOSPITAL ENCOUNTER (EMERGENCY)
Facility: HOSPITAL | Age: 64
Discharge: HOME | End: 2024-06-23
Attending: EMERGENCY MEDICINE

## 2024-06-23 ENCOUNTER — APPOINTMENT (EMERGENCY)
Dept: RADIOLOGY | Facility: HOSPITAL | Age: 64
End: 2024-06-23

## 2024-06-23 VITALS
OXYGEN SATURATION: 96 % | SYSTOLIC BLOOD PRESSURE: 162 MMHG | WEIGHT: 210 LBS | RESPIRATION RATE: 20 BRPM | TEMPERATURE: 98 F | DIASTOLIC BLOOD PRESSURE: 74 MMHG | HEART RATE: 63 BPM | BODY MASS INDEX: 32.89 KG/M2

## 2024-06-23 DIAGNOSIS — J44.1 COPD EXACERBATION (HCC): Primary | ICD-10-CM

## 2024-06-23 LAB
ANION GAP SERPL CALCULATED.3IONS-SCNC: 10 MMOL/L (ref 4–13)
BASOPHILS # BLD AUTO: 0.04 THOUSANDS/ÂΜL (ref 0–0.1)
BASOPHILS NFR BLD AUTO: 0 % (ref 0–1)
BNP SERPL-MCNC: 52 PG/ML (ref 0–100)
BUN SERPL-MCNC: 25 MG/DL (ref 5–25)
CALCIUM SERPL-MCNC: 9.7 MG/DL (ref 8.4–10.2)
CARDIAC TROPONIN I PNL SERPL HS: 5 NG/L
CHLORIDE SERPL-SCNC: 100 MMOL/L (ref 96–108)
CO2 SERPL-SCNC: 26 MMOL/L (ref 21–32)
CREAT SERPL-MCNC: 0.95 MG/DL (ref 0.6–1.3)
EOSINOPHIL # BLD AUTO: 0.02 THOUSAND/ÂΜL (ref 0–0.61)
EOSINOPHIL NFR BLD AUTO: 0 % (ref 0–6)
ERYTHROCYTE [DISTWIDTH] IN BLOOD BY AUTOMATED COUNT: 13.2 % (ref 11.6–15.1)
GFR SERPL CREATININE-BSD FRML MDRD: 84 ML/MIN/1.73SQ M
GLUCOSE SERPL-MCNC: 150 MG/DL (ref 65–140)
HCT VFR BLD AUTO: 44 % (ref 36.5–49.3)
HGB BLD-MCNC: 14.4 G/DL (ref 12–17)
IMM GRANULOCYTES # BLD AUTO: 0.14 THOUSAND/UL (ref 0–0.2)
IMM GRANULOCYTES NFR BLD AUTO: 1 % (ref 0–2)
LYMPHOCYTES # BLD AUTO: 1.46 THOUSANDS/ÂΜL (ref 0.6–4.47)
LYMPHOCYTES NFR BLD AUTO: 10 % (ref 14–44)
MCH RBC QN AUTO: 31.1 PG (ref 26.8–34.3)
MCHC RBC AUTO-ENTMCNC: 32.7 G/DL (ref 31.4–37.4)
MCV RBC AUTO: 95 FL (ref 82–98)
MONOCYTES # BLD AUTO: 0.8 THOUSAND/ÂΜL (ref 0.17–1.22)
MONOCYTES NFR BLD AUTO: 5 % (ref 4–12)
NEUTROPHILS # BLD AUTO: 12.89 THOUSANDS/ÂΜL (ref 1.85–7.62)
NEUTS SEG NFR BLD AUTO: 84 % (ref 43–75)
NRBC BLD AUTO-RTO: 0 /100 WBCS
PLATELET # BLD AUTO: 335 THOUSANDS/UL (ref 149–390)
PMV BLD AUTO: 10.1 FL (ref 8.9–12.7)
POTASSIUM SERPL-SCNC: 3.8 MMOL/L (ref 3.5–5.3)
PROCALCITONIN SERPL-MCNC: <0.05 NG/ML
RBC # BLD AUTO: 4.63 MILLION/UL (ref 3.88–5.62)
SODIUM SERPL-SCNC: 136 MMOL/L (ref 135–147)
WBC # BLD AUTO: 15.35 THOUSAND/UL (ref 4.31–10.16)

## 2024-06-23 PROCEDURE — 36415 COLL VENOUS BLD VENIPUNCTURE: CPT | Performed by: EMERGENCY MEDICINE

## 2024-06-23 PROCEDURE — 93005 ELECTROCARDIOGRAM TRACING: CPT

## 2024-06-23 PROCEDURE — 83880 ASSAY OF NATRIURETIC PEPTIDE: CPT | Performed by: EMERGENCY MEDICINE

## 2024-06-23 PROCEDURE — 84484 ASSAY OF TROPONIN QUANT: CPT | Performed by: EMERGENCY MEDICINE

## 2024-06-23 PROCEDURE — 99284 EMERGENCY DEPT VISIT MOD MDM: CPT | Performed by: EMERGENCY MEDICINE

## 2024-06-23 PROCEDURE — 99285 EMERGENCY DEPT VISIT HI MDM: CPT

## 2024-06-23 PROCEDURE — 85025 COMPLETE CBC W/AUTO DIFF WBC: CPT | Performed by: EMERGENCY MEDICINE

## 2024-06-23 PROCEDURE — 71045 X-RAY EXAM CHEST 1 VIEW: CPT

## 2024-06-23 PROCEDURE — 96374 THER/PROPH/DIAG INJ IV PUSH: CPT

## 2024-06-23 PROCEDURE — 94640 AIRWAY INHALATION TREATMENT: CPT

## 2024-06-23 PROCEDURE — 84145 PROCALCITONIN (PCT): CPT | Performed by: EMERGENCY MEDICINE

## 2024-06-23 PROCEDURE — 80048 BASIC METABOLIC PNL TOTAL CA: CPT | Performed by: EMERGENCY MEDICINE

## 2024-06-23 RX ORDER — METHYLPREDNISOLONE SODIUM SUCCINATE 125 MG/2ML
80 INJECTION, POWDER, LYOPHILIZED, FOR SOLUTION INTRAMUSCULAR; INTRAVENOUS ONCE
Status: COMPLETED | OUTPATIENT
Start: 2024-06-23 | End: 2024-06-23

## 2024-06-23 RX ORDER — ALBUTEROL SULFATE 2.5 MG/3ML
5 SOLUTION RESPIRATORY (INHALATION) ONCE
Status: COMPLETED | OUTPATIENT
Start: 2024-06-23 | End: 2024-06-23

## 2024-06-23 RX ADMIN — METHYLPREDNISOLONE SODIUM SUCCINATE 80 MG: 125 INJECTION, POWDER, FOR SOLUTION INTRAMUSCULAR; INTRAVENOUS at 17:51

## 2024-06-23 RX ADMIN — ALBUTEROL SULFATE 5 MG: 2.5 SOLUTION RESPIRATORY (INHALATION) at 17:50

## 2024-06-23 RX ADMIN — IPRATROPIUM BROMIDE 0.5 MG: 0.5 SOLUTION RESPIRATORY (INHALATION) at 17:50

## 2024-06-23 NOTE — ED PROVIDER NOTES
History  Chief Complaint   Patient presents with    Shortness of Breath     Pt to ED c/o SOB that started 2 days ago. States he was here for same issue 6/18 and feels the same way and feels he needs to be admitted again. +productive cough      63-year-old male with a history of COPD presents for evaluation of cough and shortness of breath.  Patient was discharged from here a few days ago with similar symptoms.  States he felt better when he was discharged but then symptoms returned.  Denies chest pain, fever.        Prior to Admission Medications   Prescriptions Last Dose Informant Patient Reported? Taking?   Fluticasone Furoate-Vilanterol 100-25 mcg/actuation inhaler   No No   Sig: Inhale 1 puff daily Rinse mouth after use.   albuterol (Proventil HFA) 90 mcg/act inhaler   No No   Sig: Inhale 1-2 puffs every 6 (six) hours as needed for wheezing or shortness of breath   benzonatate (TESSALON PERLES) 100 mg capsule   No No   Sig: Take 1 capsule (100 mg total) by mouth every 8 (eight) hours   guaiFENesin (MUCINEX) 600 mg 12 hr tablet   No No   Sig: Take 1 tablet (600 mg total) by mouth 2 (two) times a day   methylPREDNISolone 4 MG tablet therapy pack   No No   Sig: Use as directed on package   metroNIDAZOLE (METROCREAM) 0.75 % cream   No No   Sig: Apply 1 Application topically 2 (two) times a day      Facility-Administered Medications: None       Past Medical History:   Diagnosis Date    Deafness in right ear     Hypertension        Past Surgical History:   Procedure Laterality Date    SPLENECTOMY, TOTAL         History reviewed. No pertinent family history.  I have reviewed and agree with the history as documented.    E-Cigarette/Vaping    E-Cigarette Use Never User      E-Cigarette/Vaping Substances    Nicotine No     THC No     CBD No     Flavoring No     Other No     Unknown No      Social History     Tobacco Use    Smoking status: Former     Current packs/day: 0.00     Types: Cigarettes     Quit date: 9/24/1987      Years since quittin.7    Smokeless tobacco: Never   Vaping Use    Vaping status: Never Used   Substance Use Topics    Alcohol use: Not Currently    Drug use: Not Currently       Review of Systems   Constitutional:  Negative for fever.   Respiratory:  Positive for cough and shortness of breath.        Physical Exam  Physical Exam  Vitals and nursing note reviewed.   Constitutional:       General: He is not in acute distress.     Appearance: He is well-developed.   HENT:      Head: Normocephalic and atraumatic.      Right Ear: External ear normal.      Left Ear: External ear normal.      Nose: Nose normal.   Eyes:      General: No scleral icterus.  Pulmonary:      Effort: Pulmonary effort is normal. No respiratory distress.      Comments: Mild scattered wheezing  Abdominal:      General: There is no distension.      Palpations: Abdomen is soft.   Musculoskeletal:         General: No deformity. Normal range of motion.      Cervical back: Normal range of motion and neck supple.   Skin:     General: Skin is warm.      Findings: No rash.   Neurological:      General: No focal deficit present.      Mental Status: He is alert.      Gait: Gait normal.   Psychiatric:         Mood and Affect: Mood normal.         Vital Signs  ED Triage Vitals [24 1646]   Temperature Pulse Respirations Blood Pressure SpO2   98 °F (36.7 °C) 73 20 (!) 217/106 95 %      Temp src Heart Rate Source Patient Position - Orthostatic VS BP Location FiO2 (%)   -- Monitor Sitting Left arm --      Pain Score       --           Vitals:    24 1646 24 1730 24 1800   BP: (!) 217/106 (!) 197/85 162/74   Pulse: 73 75 63   Patient Position - Orthostatic VS: Sitting Sitting Sitting         Visual Acuity  Visual Acuity      Flowsheet Row Most Recent Value   L Pupil Size (mm) 3   R Pupil Size (mm) 3            ED Medications  Medications   albuterol inhalation solution 5 mg (5 mg Nebulization Given 24 1750)   ipratropium (ATROVENT)  0.02 % inhalation solution 0.5 mg (0.5 mg Nebulization Given 6/23/24 1750)   methylPREDNISolone sodium succinate (Solu-MEDROL) injection 80 mg (80 mg Intravenous Given 6/23/24 1751)       Diagnostic Studies  Results Reviewed       Procedure Component Value Units Date/Time    Procalcitonin [[id number]]  (Normal) Collected: 06/23/24 1747    Lab Status: Final result Specimen: Blood from Arm, Right Updated: 06/23/24 1819     Procalcitonin <0.05 ng/ml     B-Type Natriuretic Peptide(BNP) [[id number]]  (Normal) Collected: 06/23/24 1747    Lab Status: Final result Specimen: Blood from Arm, Right Updated: 06/23/24 1816     BNP 52 pg/mL     HS Troponin I 2hr [[id number]]     Lab Status: No result Specimen: Blood     HS Troponin 0hr (reflex protocol) [[id number]]  (Normal) Collected: 06/23/24 1747    Lab Status: Final result Specimen: Blood from Arm, Right Updated: 06/23/24 1816     hs TnI 0hr 5 ng/L     Basic metabolic panel [[id number]]  (Abnormal) Collected: 06/23/24 1747    Lab Status: Final result Specimen: Blood from Arm, Right Updated: 06/23/24 1811     Sodium 136 mmol/L      Potassium 3.8 mmol/L      Chloride 100 mmol/L      CO2 26 mmol/L      ANION GAP 10 mmol/L      BUN 25 mg/dL      Creatinine 0.95 mg/dL      Glucose 150 mg/dL      Calcium 9.7 mg/dL      eGFR 84 ml/min/1.73sq m     Narrative:      National Kidney Disease Foundation guidelines for Chronic Kidney Disease (CKD):     Stage 1 with normal or high GFR (GFR > 90 mL/min/1.73 square meters)    Stage 2 Mild CKD (GFR = 60-89 mL/min/1.73 square meters)    Stage 3A Moderate CKD (GFR = 45-59 mL/min/1.73 square meters)    Stage 3B Moderate CKD (GFR = 30-44 mL/min/1.73 square meters)    Stage 4 Severe CKD (GFR = 15-29 mL/min/1.73 square meters)    Stage 5 End Stage CKD (GFR <15 mL/min/1.73 square meters)  Note: GFR calculation is accurate only with a steady state creatinine    CBC and differential [[id number]]  (Abnormal) Collected: 06/23/24 1187    Lab Status: Final  result Specimen: Blood from Arm, Right Updated: 06/23/24 1753     WBC 15.35 Thousand/uL      RBC 4.63 Million/uL      Hemoglobin 14.4 g/dL      Hematocrit 44.0 %      MCV 95 fL      MCH 31.1 pg      MCHC 32.7 g/dL      RDW 13.2 %      MPV 10.1 fL      Platelets 335 Thousands/uL      nRBC 0 /100 WBCs      Segmented % 84 %      Immature Grans % 1 %      Lymphocytes % 10 %      Monocytes % 5 %      Eosinophils Relative 0 %      Basophils Relative 0 %      Absolute Neutrophils 12.89 Thousands/µL      Absolute Immature Grans 0.14 Thousand/uL      Absolute Lymphocytes 1.46 Thousands/µL      Absolute Monocytes 0.80 Thousand/µL      Eosinophils Absolute 0.02 Thousand/µL      Basophils Absolute 0.04 Thousands/µL                    X-ray chest 1 view portable   Final Result by Felicity Daniel MD (06/23 1905)      No acute cardiopulmonary disease.            Workstation performed: [id number]                    Procedures  Procedures         ED Course                               SBIRT 22yo+      Flowsheet Row Most Recent Value   Initial Alcohol Screen: US AUDIT-C     1. How often do you have a drink containing alcohol? 0 Filed at: 06/23/2024 1742   2. How many drinks containing alcohol do you have on a typical day you are drinking?  0 Filed at: 06/23/2024 1742   3a. Male UNDER 65: How often do you have five or more drinks on one occasion? 0 Filed at: 06/23/2024 1742   3b. FEMALE Any Age, or MALE 65+: How often do you have 4 or more drinks on one occassion? 0 Filed at: 06/23/2024 1742   Audit-C Score 0 Filed at: 06/23/2024 1742   ANJELICA: How many times in the past year have you...    Used an illegal drug or used a prescription medication for non-medical reasons? Never Filed at: 06/23/2024 1742                      Medical Decision Making  63-year-old male with cough and shortness of breath consistent with COPD exacerbation.  Obtain labs, chest x-ray to assess for pneumonia.  Symptom control as needed.    Wheezing has  resolved.  Patient with improvement of symptoms.  Advised to continue medications prescribed upon discharge.  Follow-up with PCP.    Amount and/or Complexity of Data Reviewed  Labs: ordered.  Radiology: ordered.    Risk  Prescription drug management.             Disposition  Final diagnoses:   COPD exacerbation (HCC)     Time reflects when diagnosis was documented in both MDM as applicable and the Disposition within this note       Time User Action Codes Description Comment    6/23/2024  7:23 PM Victor M Mortensen Add [J44.1] COPD exacerbation (HCC)           ED Disposition       ED Disposition   Discharge    Condition   Stable    Date/Time   Sun Jun 23, 2024 1923    Comment   Albert Jensen discharge to home/self care.                   Follow-up Information       Follow up With Specialties Details Why Contact Info Additional Information    Jasson Warner DO Internal Medicine   [address] [place] [id number] [phone]        [facility] Emergency Department Emergency Medicine  If symptoms worsen [address] [facility] [id number] [phone] [facility] Emergency Department, [address] [place], [place] [address]            Patient's Medications   Discharge Prescriptions    No medications on file       No discharge procedures on file.    PDMP Review         Value Time User    PDMP Reviewed  Yes 8/1/2023 10:48 PM Harvey Aguiar DO            ED Provider  Electronically Signed by             Victor M Mortensen DO  06/23/24 1946

## 2024-06-24 PROCEDURE — 88305 TISSUE EXAM BY PATHOLOGIST: CPT | Performed by: PATHOLOGY

## 2024-06-25 ENCOUNTER — TELEPHONE (OUTPATIENT)
Dept: SURGERY | Facility: CLINIC | Age: 64
End: 2024-06-25

## 2024-06-25 NOTE — TELEPHONE ENCOUNTER
Called patient left message to call for f/u appt basal cell ca that needs wide excision and also to have lipoma removed. Should schedule as procedure with Ana Cristina Murillo or Dr. Mckeon.

## 2024-06-27 ENCOUNTER — HOSPITAL ENCOUNTER (EMERGENCY)
Facility: HOSPITAL | Age: 64
Discharge: HOME | End: 2024-06-27
Attending: EMERGENCY MEDICINE

## 2024-06-27 ENCOUNTER — APPOINTMENT (EMERGENCY)
Dept: RADIOLOGY | Facility: HOSPITAL | Age: 64
End: 2024-06-27

## 2024-06-27 VITALS
OXYGEN SATURATION: 96 % | RESPIRATION RATE: 20 BRPM | DIASTOLIC BLOOD PRESSURE: 83 MMHG | HEART RATE: 66 BPM | SYSTOLIC BLOOD PRESSURE: 176 MMHG | TEMPERATURE: 98.6 F

## 2024-06-27 DIAGNOSIS — I10 HIGH BLOOD PRESSURE: Primary | ICD-10-CM

## 2024-06-27 DIAGNOSIS — J20.9 ACUTE BRONCHITIS WITH WHEEZING: ICD-10-CM

## 2024-06-27 DIAGNOSIS — F20.9 SCHIZOPHRENIA (HCC): ICD-10-CM

## 2024-06-27 LAB
ALBUMIN SERPL BCG-MCNC: 4.1 G/DL (ref 3.5–5)
ALP SERPL-CCNC: 68 U/L (ref 34–104)
ALT SERPL W P-5'-P-CCNC: 17 U/L (ref 7–52)
ANION GAP SERPL CALCULATED.3IONS-SCNC: 8 MMOL/L (ref 4–13)
AST SERPL W P-5'-P-CCNC: 13 U/L (ref 13–39)
ATRIAL RATE: 64 BPM
BASOPHILS # BLD AUTO: 0.04 THOUSANDS/ÂΜL (ref 0–0.1)
BASOPHILS NFR BLD AUTO: 0 % (ref 0–1)
BILIRUB SERPL-MCNC: 0.38 MG/DL (ref 0.2–1)
BUN SERPL-MCNC: 23 MG/DL (ref 5–25)
CALCIUM SERPL-MCNC: 9.1 MG/DL (ref 8.4–10.2)
CARDIAC TROPONIN I PNL SERPL HS: 5 NG/L
CHLORIDE SERPL-SCNC: 101 MMOL/L (ref 96–108)
CO2 SERPL-SCNC: 29 MMOL/L (ref 21–32)
CREAT SERPL-MCNC: 1.02 MG/DL (ref 0.6–1.3)
EOSINOPHIL # BLD AUTO: 0.2 THOUSAND/ÂΜL (ref 0–0.61)
EOSINOPHIL NFR BLD AUTO: 1 % (ref 0–6)
ERYTHROCYTE [DISTWIDTH] IN BLOOD BY AUTOMATED COUNT: 13.2 % (ref 11.6–15.1)
GFR SERPL CREATININE-BSD FRML MDRD: 77 ML/MIN/1.73SQ M
GLUCOSE SERPL-MCNC: 119 MG/DL (ref 65–140)
HCT VFR BLD AUTO: 45.1 % (ref 36.5–49.3)
HGB BLD-MCNC: 15 G/DL (ref 12–17)
IMM GRANULOCYTES # BLD AUTO: 0.18 THOUSAND/UL (ref 0–0.2)
IMM GRANULOCYTES NFR BLD AUTO: 1 % (ref 0–2)
LYMPHOCYTES # BLD AUTO: 3.88 THOUSANDS/ÂΜL (ref 0.6–4.47)
LYMPHOCYTES NFR BLD AUTO: 24 % (ref 14–44)
MAGNESIUM SERPL-MCNC: 2.4 MG/DL (ref 1.9–2.7)
MCH RBC QN AUTO: 31.6 PG (ref 26.8–34.3)
MCHC RBC AUTO-ENTMCNC: 33.3 G/DL (ref 31.4–37.4)
MCV RBC AUTO: 95 FL (ref 82–98)
MONOCYTES # BLD AUTO: 1.54 THOUSAND/ÂΜL (ref 0.17–1.22)
MONOCYTES NFR BLD AUTO: 10 % (ref 4–12)
NEUTROPHILS # BLD AUTO: 10.05 THOUSANDS/ÂΜL (ref 1.85–7.62)
NEUTS SEG NFR BLD AUTO: 64 % (ref 43–75)
NRBC BLD AUTO-RTO: 0 /100 WBCS
P AXIS: 32 DEGREES
PLATELET # BLD AUTO: 325 THOUSANDS/UL (ref 149–390)
PMV BLD AUTO: 9.8 FL (ref 8.9–12.7)
POTASSIUM SERPL-SCNC: 3.6 MMOL/L (ref 3.5–5.3)
PR INTERVAL: 150 MS
PROT SERPL-MCNC: 6.9 G/DL (ref 6.4–8.4)
QRS AXIS: 48 DEGREES
QRSD INTERVAL: 80 MS
QT INTERVAL: 420 MS
QTC INTERVAL: 433 MS
RBC # BLD AUTO: 4.75 MILLION/UL (ref 3.88–5.62)
SODIUM SERPL-SCNC: 138 MMOL/L (ref 135–147)
T WAVE AXIS: 52 DEGREES
VENTRICULAR RATE: 64 BPM
WBC # BLD AUTO: 15.89 THOUSAND/UL (ref 4.31–10.16)

## 2024-06-27 PROCEDURE — 85025 COMPLETE CBC W/AUTO DIFF WBC: CPT

## 2024-06-27 PROCEDURE — 84484 ASSAY OF TROPONIN QUANT: CPT

## 2024-06-27 PROCEDURE — 93010 ELECTROCARDIOGRAM REPORT: CPT | Performed by: INTERNAL MEDICINE

## 2024-06-27 PROCEDURE — 36415 COLL VENOUS BLD VENIPUNCTURE: CPT

## 2024-06-27 PROCEDURE — 99283 EMERGENCY DEPT VISIT LOW MDM: CPT

## 2024-06-27 PROCEDURE — 80053 COMPREHEN METABOLIC PANEL: CPT

## 2024-06-27 PROCEDURE — 71045 X-RAY EXAM CHEST 1 VIEW: CPT

## 2024-06-27 PROCEDURE — 83735 ASSAY OF MAGNESIUM: CPT

## 2024-06-27 PROCEDURE — 99284 EMERGENCY DEPT VISIT MOD MDM: CPT | Performed by: EMERGENCY MEDICINE

## 2024-06-27 RX ORDER — SODIUM CHLORIDE 9 MG/ML
3 INJECTION INTRAVENOUS
Status: DISCONTINUED | OUTPATIENT
Start: 2024-06-27 | End: 2024-06-27 | Stop reason: HOSPADM

## 2024-06-27 RX ORDER — ALBUTEROL SULFATE 90 UG/1
1-2 AEROSOL, METERED RESPIRATORY (INHALATION) EVERY 6 HOURS PRN
Qty: 18 G | Refills: 0 | Status: SHIPPED | OUTPATIENT
Start: 2024-06-27

## 2024-06-27 NOTE — ED NOTES
Issue that patient was hesitant to tell triage nurse was a surgical wound on his L mid back - had a skin biopsy and 5 stitches placed 9 days ago as per patient.      Chato Sarmiento RN  06/27/24 1938

## 2024-06-28 NOTE — DISCHARGE INSTRUCTIONS
Your blood pressure is elevated.  Continue to take your blood pressure medicines.  Keep well-hydrated.  Try to get enough sleep.    You may have mild bronchitis.  This will improve with inhalers.  Speak to the pharmacist and your family doctor regarding the cost of the inhaler.    Return to the surgeon for suture removal.

## 2024-06-28 NOTE — ED PROVIDER NOTES
"History  Chief Complaint   Patient presents with    Medical Problem     Patient presents to the ED with c/o \"breathing issues like congestion and stuff\" and states he \"wants a room because (he) has some issues\".  States he has other things he \"wants to talk to the doctor about\" and would not disclose this to the triage nurse. States he was seen for congestion on Sunday and d/c but today he would like to be admitted for same      63-year-old male with hypertension, schizoaffective disorder was admitted to this hospital for shortness of breath, bronchitis type symptoms 6/18/24 to 6/20/24 for complaints of  shortness of breath and wheezing for several days prior to admitting.  He was concerned about environmental allergens where he lives.  He is very concerned about his family keeping him in the apartment that he does not like.  He returned with similar symptoms a few days after finishing antibiotic and albuterol, several days ago.  He had declined steroids that were ordered.  He was discharged with instructions to follow-up with pulmonary medicine and PCP.  He returns today saying he is not much better.  He is still complaining about where he lives, that he does not like where he lives and needs to be moved to a different apartment.  Denies recent fever, GI and  symptoms.        Prior to Admission Medications   Prescriptions Last Dose Informant Patient Reported? Taking?   Fluticasone Furoate-Vilanterol 100-25 mcg/actuation inhaler   No No   Sig: Inhale 1 puff daily Rinse mouth after use.   albuterol (Proventil HFA) 90 mcg/act inhaler   No No   Sig: Inhale 1-2 puffs every 6 (six) hours as needed for wheezing or shortness of breath   albuterol (Proventil HFA) 90 mcg/act inhaler   No Yes   Sig: Inhale 1-2 puffs every 6 (six) hours as needed for wheezing or shortness of breath   benzonatate (TESSALON PERLES) 100 mg capsule   No No   Sig: Take 1 capsule (100 mg total) by mouth every 8 (eight) hours   guaiFENesin " (MUCINEX) 600 mg 12 hr tablet   No No   Sig: Take 1 tablet (600 mg total) by mouth 2 (two) times a day   methylPREDNISolone 4 MG tablet therapy pack   No No   Sig: Use as directed on package   metroNIDAZOLE (METROCREAM) 0.75 % cream   No No   Sig: Apply 1 Application topically 2 (two) times a day      Facility-Administered Medications: None       Past Medical History:   Diagnosis Date    Deafness in right ear     Hypertension        Past Surgical History:   Procedure Laterality Date    SPLENECTOMY, TOTAL         History reviewed. No pertinent family history.  I have reviewed and agree with the history as documented.    E-Cigarette/Vaping    E-Cigarette Use Never User      E-Cigarette/Vaping Substances    Nicotine No     THC No     CBD No     Flavoring No     Other No     Unknown No      Social History     Tobacco Use    Smoking status: Former     Current packs/day: 0.00     Types: Cigarettes     Quit date: 1987     Years since quittin.7    Smokeless tobacco: Never   Vaping Use    Vaping status: Never Used   Substance Use Topics    Alcohol use: Not Currently    Drug use: Not Currently       Review of Systems   Unable to perform ROS: Psychiatric disorder   Constitutional:  Negative for fever.   Respiratory:  Positive for cough, shortness of breath and wheezing.    Cardiovascular:  Negative for chest pain and leg swelling.       Physical Exam  Physical Exam  Vitals and nursing note reviewed.   Constitutional:       General: He is not in acute distress.     Appearance: He is well-developed. He is obese. He is not ill-appearing or diaphoretic.   HENT:      Head: Normocephalic and atraumatic.      Right Ear: External ear normal.      Left Ear: External ear normal.      Nose: No rhinorrhea.      Mouth/Throat:      Mouth: Mucous membranes are moist.      Pharynx: Oropharynx is clear.   Eyes:      General: No scleral icterus.     Conjunctiva/sclera: Conjunctivae normal.   Neck:      Vascular: No JVD.    Cardiovascular:      Rate and Rhythm: Normal rate and regular rhythm.      Pulses: Normal pulses.      Heart sounds: Normal heart sounds.   Pulmonary:      Effort: Pulmonary effort is normal. No respiratory distress.      Breath sounds: Wheezing (Left middle and right middle lobes) present. No rales.   Abdominal:      General: Bowel sounds are normal.      Palpations: Abdomen is soft. There is no mass.      Tenderness: There is no abdominal tenderness. There is no guarding.   Musculoskeletal:         General: No tenderness. Normal range of motion.      Cervical back: Neck supple.      Right lower leg: No edema.      Left lower leg: No edema.   Lymphadenopathy:      Cervical: No cervical adenopathy.   Skin:     General: Skin is warm and dry.      Capillary Refill: Capillary refill takes less than 2 seconds.      Coloration: Skin is not jaundiced or pale.      Findings: No rash.   Neurological:      General: No focal deficit present.      Mental Status: He is alert and oriented to person, place, and time. Mental status is at baseline.      Cranial Nerves: No cranial nerve deficit.      Sensory: No sensory deficit.      Motor: No weakness.      Coordination: Coordination normal.      Gait: Gait normal.      Deep Tendon Reflexes: Reflexes are normal and symmetric.   Psychiatric:         Mood and Affect: Mood normal.         Behavior: Behavior normal.         Vital Signs  ED Triage Vitals   Temperature Pulse Respirations Blood Pressure SpO2   06/27/24 1713 06/27/24 1712 06/27/24 1712 06/27/24 1712 06/27/24 1712   98.6 °F (37 °C) 72 18 163/91 97 %      Temp Source Heart Rate Source Patient Position - Orthostatic VS BP Location FiO2 (%)   06/27/24 1713 06/27/24 1712 06/27/24 1712 06/27/24 1712 --   Temporal Monitor Sitting Right arm       Pain Score       06/27/24 1712       No Pain           Vitals:    06/27/24 1712 06/27/24 1931 06/27/24 2054   BP: 163/91 (!) 185/101 (!) 176/83   Pulse: 72 66 66   Patient Position -  Orthostatic VS: Sitting  Lying         Visual Acuity      ED Medications  Medications - No data to display      Diagnostic Studies  Results Reviewed       Procedure Component Value Units Date/Time    HS Troponin 0hr (reflex protocol) [[id number]]  (Normal) Collected: 06/27/24 2021    Lab Status: Final result Specimen: Blood from Arm, Left Updated: 06/27/24 2048     hs TnI 0hr 5 ng/L     Comprehensive metabolic panel [[id number]] Collected: 06/27/24 2021    Lab Status: Final result Specimen: Blood from Arm, Left Updated: 06/27/24 2040     Sodium 138 mmol/L      Potassium 3.6 mmol/L      Chloride 101 mmol/L      CO2 29 mmol/L      ANION GAP 8 mmol/L      BUN 23 mg/dL      Creatinine 1.02 mg/dL      Glucose 119 mg/dL      Calcium 9.1 mg/dL      AST 13 U/L      ALT 17 U/L      Alkaline Phosphatase 68 U/L      Total Protein 6.9 g/dL      Albumin 4.1 g/dL      Total Bilirubin 0.38 mg/dL      eGFR 77 ml/min/1.73sq m     Narrative:      National Kid[employer] Disease Foundation guidelines for Chronic Kidney Disease (CKD):     Stage 1 with normal or high GFR (GFR > 90 mL/min/1.73 square meters)    Stage 2 Mild CKD (GFR = 60-89 mL/min/1.73 square meters)    Stage 3A Moderate CKD (GFR = 45-59 mL/min/1.73 square meters)    Stage 3B Moderate CKD (GFR = 30-44 mL/min/1.73 square meters)    Stage 4 Severe CKD (GFR = 15-29 mL/min/1.73 square meters)    Stage 5 End Stage CKD (GFR <15 mL/min/1.73 square meters)  Note: GFR calculation is accurate only with a steady state creatinine    Magnesium [[id number]]  (Normal) Collected: 06/27/24 2021    Lab Status: Final result Specimen: Blood from Arm, Left Updated: 06/27/24 2040     Magnesium 2.4 mg/dL     CBC and differential [[id number]]  (Abnormal) Collected: 06/27/24 2021    Lab Status: Final result Specimen: Blood from Arm, Left Updated: 06/27/24 2026     WBC 15.89 Thousand/uL      RBC 4.75 Million/uL      Hemoglobin 15.0 g/dL      Hematocrit 45.1 %      MCV 95 fL      MCH 31.6 pg      MCHC 33.3  g/dL      RDW 13.2 %      MPV 9.8 fL      Platelets 325 Thousands/uL      nRBC 0 /100 WBCs      Segmented % 64 %      Immature Grans % 1 %      Lymphocytes % 24 %      Monocytes % 10 %      Eosinophils Relative 1 %      Basophils Relative 0 %      Absolute Neutrophils 10.05 Thousands/µL      Absolute Immature Grans 0.18 Thousand/uL      Absolute Lymphocytes 3.88 Thousands/µL      Absolute Monocytes 1.54 Thousand/µL      Eosinophils Absolute 0.20 Thousand/µL      Basophils Absolute 0.04 Thousands/µL                    X-ray chest 1 view portable   ED Interpretation by Harvey Aguiar DO (06/27 2059)   No acute cardiopulmonary disease      Final Result by Po Marmolejo MD (06/28 1605)      No acute cardiopulmonary disease.            Workstation performed: [id number]                    Procedures  Procedures         ED Course                               SBIRT 22yo+      Flowsheet Row Most Recent Value   Initial Alcohol Screen: US AUDIT-C     1. How often do you have a drink containing alcohol? 0 Filed at: 06/27/2024 1713   2. How many drinks containing alcohol do you have on a typical day you are drinking?  0 Filed at: 06/27/2024 1713   3a. Male UNDER 65: How often do you have five or more drinks on one occasion? 0 Filed at: 06/27/2024 1713   3b. FEMALE Any Age, or MALE 65+: How often do you have 4 or more drinks on one occassion? 0 Filed at: 06/27/2024 1713   Audit-C Score 0 Filed at: 06/27/2024 1713   ANJELICA: How many times in the past year have you...    Used an illegal drug or used a prescription medication for non-medical reasons? Never Filed at: 06/27/2024 1713                      Medical Decision Making  63-year-old male complaining of some wheezing, mostly emphasizing he does not like where he lives and would like help in finding a place to live.  Seen here several times with similar complaints.  Has never been compliant with albuterol inhalers.  On exam patient is stable and does not require  admission.  We discussed ways that he could afford the inhaler.  Patient discharged to follow-up with PCP.    Amount and/or Complexity of Data Reviewed  External Data Reviewed: notes.  Labs: ordered.  Radiology: ordered and independent interpretation performed.    Risk  Prescription drug management.             Disposition  Final diagnoses:   High blood pressure   Schizophrenia (HCC)     Time reflects when diagnosis was documented in both MDM as applicable and the Disposition within this note       Time User Action Codes Description Comment    6/27/2024  9:00 PM Harvey Aguiar Add [I10] High blood pressure     6/27/2024  9:02 PM Harvey Aguiar Add [F20.9] Schizophrenia (HCC)     6/27/2024  9:07 PM Harvey Aguiar Add [J20.9] Acute bronchitis with wheezing           ED Disposition       ED Disposition   Discharge    Condition   Stable    Date/Time   Thu Jun 27, 2024 2100    Comment   Albert Jensen discharge to home/self care.                   Follow-up Information       Follow up With Specialties Details Why Contact Yaniv Warner DO Internal Medicine Call in 1 day  [address] [place] [address]  [phone]              Discharge Medication List as of 6/27/2024  9:09 PM        CONTINUE these medications which have CHANGED    Details   albuterol (Proventil HFA) 90 mcg/act inhaler Inhale 1-2 puffs every 6 (six) hours as needed for wheezing or shortness of breath, Starting u 6/27/2024, Normal           CONTINUE these medications which have NOT CHANGED    Details   benzonatate (TESSALON PERLES) 100 mg capsule Take 1 capsule (100 mg total) by mouth every 8 (eight) hours, Starting Sun 7/30/2023, Normal      Fluticasone Furoate-Vilanterol 100-25 mcg/actuation inhaler Inhale 1 puff daily Rinse mouth after use., Starting Fri 6/21/2024, Normal      guaiFENesin (MUCINEX) 600 mg 12 hr tablet Take 1 tablet (600 mg total) by mouth 2 (two) times a day, Starting u 6/20/2024, Normal      methylPREDNISolone  4 MG tablet therapy pack Use as directed on package, Normal      metroNIDAZOLE (METROCREAM) 0.75 % cream Apply 1 Application topically 2 (two) times a day, Starting u 6/20/2024, Normal             No discharge procedures on file.    PDMP Review         Value Time User    PDMP Reviewed  Yes 8/1/2023 10:48 PM Harvey Aguiar DO            ED Provider  Electronically Signed by             Harvey Aguiar DO  06/29/24 0164

## 2024-07-01 ENCOUNTER — TELEPHONE (OUTPATIENT)
Dept: SURGERY | Facility: HOSPITAL | Age: 64
End: 2024-07-01

## 2024-07-01 NOTE — TELEPHONE ENCOUNTER
Second attempt to reach patient re: Basal Cell CA and need for f/u appt for wide excision. I left message to call me back and also called and left message for sister Jennifer as well.

## 2024-07-03 NOTE — TELEPHONE ENCOUNTER
Pt returning call from lida, upon transferring the call to office. Phone got disconnected. Tried calling back pt and it went to voicemail.

## 2024-07-13 ENCOUNTER — TELEPHONE (OUTPATIENT)
Dept: OTHER | Facility: OTHER | Age: 64
End: 2024-07-13

## 2024-07-15 ENCOUNTER — OFFICE VISIT (OUTPATIENT)
Dept: SURGERY | Facility: HOSPITAL | Age: 64
End: 2024-07-15

## 2024-07-15 ENCOUNTER — HOSPITAL ENCOUNTER (EMERGENCY)
Facility: HOSPITAL | Age: 64
Discharge: HOME | End: 2024-07-15
Attending: EMERGENCY MEDICINE

## 2024-07-15 VITALS
HEART RATE: 76 BPM | TEMPERATURE: 98.6 F | SYSTOLIC BLOOD PRESSURE: 165 MMHG | WEIGHT: 217.6 LBS | BODY MASS INDEX: 34.08 KG/M2 | DIASTOLIC BLOOD PRESSURE: 84 MMHG

## 2024-07-15 VITALS
HEART RATE: 81 BPM | DIASTOLIC BLOOD PRESSURE: 96 MMHG | TEMPERATURE: 98.4 F | OXYGEN SATURATION: 98 % | SYSTOLIC BLOOD PRESSURE: 198 MMHG | RESPIRATION RATE: 18 BRPM

## 2024-07-15 DIAGNOSIS — L03.90 CELLULITIS: Primary | ICD-10-CM

## 2024-07-15 DIAGNOSIS — D17.21 LIPOMA OF RIGHT UPPER EXTREMITY: ICD-10-CM

## 2024-07-15 DIAGNOSIS — C44.91 CANCER OF THE SKIN, BASAL CELL: Primary | ICD-10-CM

## 2024-07-15 PROCEDURE — 99282 EMERGENCY DEPT VISIT SF MDM: CPT

## 2024-07-15 PROCEDURE — 99284 EMERGENCY DEPT VISIT MOD MDM: CPT | Performed by: EMERGENCY MEDICINE

## 2024-07-15 PROCEDURE — 99024 POSTOP FOLLOW-UP VISIT: CPT | Performed by: PHYSICIAN ASSISTANT

## 2024-07-15 RX ORDER — AZITHROMYCIN 250 MG/1
TABLET, FILM COATED ORAL
COMMUNITY
Start: 2024-06-11

## 2024-07-15 RX ORDER — CEPHALEXIN 500 MG/1
500 CAPSULE ORAL EVERY 6 HOURS SCHEDULED
Qty: 28 CAPSULE | Refills: 0 | Status: SHIPPED | OUTPATIENT
Start: 2024-07-15 | End: 2024-07-22

## 2024-07-15 RX ORDER — CEPHALEXIN 250 MG/1
500 CAPSULE ORAL ONCE
Status: COMPLETED | OUTPATIENT
Start: 2024-07-15 | End: 2024-07-15

## 2024-07-15 RX ADMIN — MICONAZOLE NITRATE: 20 CREAM TOPICAL at 05:51

## 2024-07-15 RX ADMIN — CEPHALEXIN 500 MG: 250 CAPSULE ORAL at 05:51

## 2024-07-15 NOTE — ED PROVIDER NOTES
History  Chief Complaint   Patient presents with    Rash     Patient with rash to left mid back.  Patient has been using antibiotic ointment without any relief.  Patient reports itching and drainage.     63-year-old male with history of schizophrenia presents for evaluation of redness surrounding an area of recent skin biopsy, states that he noticed redness for the last couple of days, worsening discharge since yesterday, no fevers, no chills, no nausea no vomiting no diarrhea.  Patient also states that he wants to be admitted to get a growth on his right shoulder evaluated states that it has been there for many years, states that has been having trouble with his siblings, he appears very paranoid however denies wanting to be evaluated for psychiatric help, denies any suicidal homicidal ideation.        Prior to Admission Medications   Prescriptions Last Dose Informant Patient Reported? Taking?   Fluticasone Furoate-Vilanterol 100-25 mcg/actuation inhaler   No No   Sig: Inhale 1 puff daily Rinse mouth after use.   albuterol (Proventil HFA) 90 mcg/act inhaler   No No   Sig: Inhale 1-2 puffs every 6 (six) hours as needed for wheezing or shortness of breath   benzonatate (TESSALON PERLES) 100 mg capsule   No No   Sig: Take 1 capsule (100 mg total) by mouth every 8 (eight) hours   guaiFENesin (MUCINEX) 600 mg 12 hr tablet   No No   Sig: Take 1 tablet (600 mg total) by mouth 2 (two) times a day   methylPREDNISolone 4 MG tablet therapy pack   No No   Sig: Use as directed on package   metroNIDAZOLE (METROCREAM) 0.75 % cream   No No   Sig: Apply 1 Application topically 2 (two) times a day      Facility-Administered Medications: None       Past Medical History:   Diagnosis Date    Deafness in right ear     Hypertension        Past Surgical History:   Procedure Laterality Date    SPLENECTOMY, TOTAL         No family history on file.  I have reviewed and agree with the history as documented.    E-Cigarette/Vaping     E-Cigarette Use Never User      E-Cigarette/Vaping Substances    Nicotine No     THC No     CBD No     Flavoring No     Other No     Unknown No      Social History     Tobacco Use    Smoking status: Former     Current packs/day: 0.00     Types: Cigarettes     Quit date: 1987     Years since quittin.8    Smokeless tobacco: Never   Vaping Use    Vaping status: Never Used   Substance Use Topics    Alcohol use: Not Currently    Drug use: Not Currently       Review of Systems   Constitutional:  Negative for appetite change, chills and fever.   HENT:  Negative for rhinorrhea and sore throat.    Eyes:  Negative for photophobia and visual disturbance.   Respiratory:  Negative for cough and shortness of breath.    Cardiovascular:  Negative for chest pain and palpitations.   Gastrointestinal:  Negative for abdominal pain and diarrhea.   Genitourinary:  Negative for dysuria, frequency and urgency.   Skin:  Positive for rash and wound.   Neurological:  Negative for dizziness and weakness.   All other systems reviewed and are negative.      Physical Exam  Physical Exam  Vitals and nursing note reviewed.   Constitutional:       Appearance: He is well-developed.   HENT:      Head: Normocephalic and atraumatic.      Right Ear: External ear normal.      Left Ear: External ear normal.      Mouth/Throat:      Mouth: Oropharynx is clear and moist.   Eyes:      Extraocular Movements: EOM normal.      Conjunctiva/sclera: Conjunctivae normal.      Pupils: Pupils are equal, round, and reactive to light.   Neck:      Vascular: No JVD.      Trachea: No tracheal deviation.   Cardiovascular:      Rate and Rhythm: Normal rate and regular rhythm.      Heart sounds: Normal heart sounds. No murmur heard.     No friction rub. No gallop.   Pulmonary:      Effort: Pulmonary effort is normal. No respiratory distress.      Breath sounds: No stridor. No wheezing or rales.   Abdominal:      General: There is no distension.      Palpations:  Abdomen is soft. There is no mass.      Tenderness: There is no abdominal tenderness. There is no guarding or rebound.   Musculoskeletal:         General: No edema. Normal range of motion.      Cervical back: Normal range of motion and neck supple.   Skin:     General: Skin is warm and dry.      Coloration: Skin is not pale.      Findings: Erythema and rash present.      Comments: Lesion status post biopsy on back, there is some clear discharge from the biopsy site, overlying warmth and erythema with satellite lesions   Neurological:      Mental Status: He is alert and oriented to person, place, and time.      Cranial Nerves: No cranial nerve deficit.   Psychiatric:         Mood and Affect: Mood and affect normal.         Vital Signs  ED Triage Vitals [07/15/24 0534]   Temperature Pulse Respirations Blood Pressure SpO2   98.4 °F (36.9 °C) 81 18 (!) 198/96 98 %      Temp Source Heart Rate Source Patient Position - Orthostatic VS BP Location FiO2 (%)   Oral Monitor -- -- --      Pain Score       --           Vitals:    07/15/24 0534   BP: (!) 198/96   Pulse: 81         Visual Acuity      ED Medications  Medications   cephalexin (KEFLEX) capsule 500 mg (has no administration in time range)   miconazole 2 % cream (has no administration in time range)       Diagnostic Studies  Results Reviewed       None                   No orders to display              Procedures  Procedures         ED Course  ED Course as of 07/15/24 0547   Mon Jul 15, 2024   0516 Medical record reviewed admitted 6/18/2024 for COPD exacerbation, required overnight stay also at that time had evaluation of skin tumor, had excision by surgery, pathology found basal cell carcinoma will require wide excision on outpatient follow-up                                 SBIRT 20yo+      Flowsheet Row Most Recent Value   Initial Alcohol Screen: US AUDIT-C     1. How often do you have a drink containing alcohol? 0 Filed at: 07/15/2024 0534   2. How many drinks  containing alcohol do you have on a typical day you are drinking?  0 Filed at: 07/15/2024 0534   3a. Male UNDER 65: How often do you have five or more drinks on one occasion? 0 Filed at: 07/15/2024 0534   3b. FEMALE Any Age, or MALE 65+: How often do you have 4 or more drinks on one occassion? 0 Filed at: 07/15/2024 0534   Audit-C Score 0 Filed at: 07/15/2024 0534   ANJELICA: How many times in the past year have you...    Used an illegal drug or used a prescription medication for non-medical reasons? Never Filed at: 07/15/2024 0534                      Medical Decision Making  63-year-old male with skin lesion and surrounding cellulitis versus candidal infection, given satellite lesions concern for candidal infection, will provide antifungal cream, antibiotics, otherwise there is no systemic symptoms to suspect disseminated infection, patient does not want to seek psychiatric care though does appear to be very paranoid initially became verbally aggressive but , was able to verbally de-escalate the situation.  Does not want psychiatric evaluation at this point, discussed with patient that he will need to follow-up with dermatology for further evaluation given recent findings of basal cell carcinoma    Risk  Prescription drug management.                 Disposition  Final diagnoses:   Cellulitis     Time reflects when diagnosis was documented in both MDM as applicable and the Disposition within this note       Time User Action Codes Description Comment    7/15/2024  5:41 AM Chelsie Mena Add [L30.9] Dermatitis     7/15/2024  5:41 AM Chelsie Mena Remove [L30.9] Dermatitis     7/15/2024  5:41 AM Chelsie Mena Add [L03.90] Cellulitis           ED Disposition       ED Disposition   Discharge    Condition   Stable    Date/Time   Mon Jul 15, 2024 0542    Comment   Albert Jensen discharge to home/self care.                   Follow-up Information       Follow up With Specialties Details Why Contact Info Additional Information       [facility] Emergency Department Emergency Medicine  If symptoms worsen [address] [facility] [address] [phone] [facility] Emergency Department, [address] [place], [place] [address]    Jasson Warner DO Internal Medicine Schedule an appointment as soon as possible for a visit   [address] [place] [address]  [phone]               Patient's Medications   Discharge Prescriptions    CEPHALEXIN (KEFLEX) 500 MG CAPSULE    Take 1 capsule (500 mg total) by mouth every 6 (six) hours for 7 days       Start Date: 7/15/2024 End Date: 7/22/2024       Order Dose: 500 mg       Quantity: 28 capsule    Refills: 0       No discharge procedures on file.    PDMP Review         Value Time User    PDMP Reviewed  Yes 8/1/2023 10:48 PM Harvey Aguiar DO            ED Provider  Electronically Signed by             Chelsie Mena DO  07/15/24 0519

## 2024-07-15 NOTE — PROGRESS NOTES
Assessment/Plan:   Albert Jensen is a 63 y.o.male who is here for follow-up of skin lesion excised during hospital admission at [facility] in June.  Patient states area healed well.  He has been covering it with a bandage at home.  States he developed a rash at the site over the past 2 days.  He was seen in the emergency department this morning for the rash and was started on Keflex and given an antifungal cream.  Complains of minimal drainage with out fevers or chills.  States that area is pruritic.  She is also asking to have mass on right shoulder removed.    Basal cell carcinoma  -Lesion excised during hospital visit due to bleeding from site   PATHOLOGY  Skin, mid back:  BASAL CELL CARCINOMA, NODULAR  Note: The lesion focally extends to one lateral margin. Deeper sections were also evaluated.  -site today with erythema and fungal rash, minimal drainage, he was seen in the ED this morning for this finding and was started on Keflex and antifungal cream          Right shoulder Lipoma  -present for many years  -area is soft and non-tender but is getting larger and does become irritated by clothing and is often bumped        PLAN:  Patient does not have insurance (verified with case management)  Patient states he does not wish to obtain insurance or apply for MA.  Referral was placed on recent hospitalization and he has not yet obtained insurance.  Did place another referral to [facility] to contact patient to discuss medical assistance  Being that patient does not have insurance,  he cannot be scheduled here for outpatient procedure for reexcision of basal cell carcinoma or excision of lipoma  Have referred patient to the free surgical clinic in [place] to address these issues.  Will attempt to make appointment from office today  Wound care reviewed with patient   -patient should not use bandages over incision site.  This will not allow incision to breathe, it will keep the area moist with sweat and will contribute to further  fungal infection.  Patient to complete course of antibiotics prescribed from the emergency department today and use the antifungal cream as directed.  He should cover with dry gauze only as needed.  Gauze and tape provided to patient at appointment today.  Will ask his sisters for assistance with wound care.  If patient is unable to be seen at the surgery clinic within a few weeks we will have him follow-up again here for reevaluation of fungal infection  Plan discussed in detail with patient.  He agrees with plan.  All questions answered.    ** OF NOTE**  Patient does have a history of schizophrenia.  He appears to be very paranoid about people watching him and talks about government interference during his visit.  Unsure if patient is fully understanding situation and wound care instructions.  Discussed if patient would like help or further referral and he denied.    Will send copy of note to family medical doctor to see if they can assist with setting up patient follow-up.        HPI:  Albert Jensen is a 63 y.o.male who was referred for follow-up after removal of skin lesion from lower back during hospitalization on 6/19/24.    Patient states area healed well.  He has been covering it with a bandage at home.  States he developed a rash at the site over the past 2 days.  He was seen in the emergency department this morning for the rash and was started on Keflex and given an antifungal cream.  Complains of minimal drainage with out fevers or chills.  States that area is pruritic.  Patient is also asking to have mass on right shoulder removed.        ROS:  General ROS: negative  negative for - chills, fatigue, fever or night sweats, weight loss  Respiratory ROS: no cough, shortness of breath, or wheezing  Cardiovascular ROS: no chest pain or dyspnea on exertion  Abdomen ROS: no pain, N/V  Genito-Urinary ROS: no dysuria, trouble voiding, or hematuria  Musculoskeletal ROS: negative for - gait disturbance, joint pain or  muscle pain  Neurological ROS: no TIA or stroke symptoms  Skin ROS: See HPI    ALLERGIES  Patient has no known allergies.    Current Outpatient Medications:     albuterol (Proventil HFA) 90 mcg/act inhaler, Inhale 1-2 puffs every 6 (six) hours as needed for wheezing or shortness of breath, Disp: 18 g, Rfl: 0    benzonatate (TESSALON PERLES) 100 mg capsule, Take 1 capsule (100 mg total) by mouth every 8 (eight) hours, Disp: 21 capsule, Rfl: 0    cephalexin (KEFLEX) 500 mg capsule, Take 1 capsule (500 mg total) by mouth every 6 (six) hours for 7 days, Disp: 28 capsule, Rfl: 0    Fluticasone Furoate-Vilanterol 100-25 mcg/actuation inhaler, Inhale 1 puff daily Rinse mouth after use., Disp: 60 blister, Rfl: 0    guaiFENesin (MUCINEX) 600 mg 12 hr tablet, Take 1 tablet (600 mg total) by mouth 2 (two) times a day, Disp: 14 tablet, Rfl: 0    methylPREDNISolone 4 MG tablet therapy pack, Use as directed on package, Disp: 21 tablet, Rfl: 0    metroNIDAZOLE (METROCREAM) 0.75 % cream, Apply 1 Application topically 2 (two) times a day, Disp: 45 g, Rfl: 0  No current facility-administered medications for this visit.  Past Medical History:   Diagnosis Date    Deafness in right ear     Hypertension      Past Surgical History:   Procedure Laterality Date    SPLENECTOMY, TOTAL       No family history on file.   reports that he quit smoking about 36 years ago. His smoking use included cigarettes. He has never used smokeless tobacco. He reports that he does not currently use alcohol. He reports that he does not currently use drugs.    PHYSICAL EXAM    Vitals:    07/15/24 1407   BP: 165/84   Pulse: 76   Temp: 98.6 °F (37 °C)     Weight (last 2 days)       Date/Time Weight    07/15/24 1407 98.7 (217.6)            General Appearance:    Alert, cooperative, no distress, very anxious and appears to sometimes be responding to external stimuli   Head:    Normocephalic without obvious abnormality   Eyes:    Conjunctiva/corneas clear, EOM's  intact        Neck:   Supple, no adenopathy, no JVD   Back:     Symmetric, no spinal or CVA tenderness   Lungs:     Clear to auscultation bilaterally, no wheezing or rhonchi   Heart:    Regular rate and rhythm, S1 and S2 normal, no murmur   Abdomen:     Obese, Benign, no rebound or guarding.    Extremities:   Extremities normal. No clubbing, cyanosis or edema   Psych:   Paranoid Affect, AOx3.    Neurologic:  Skin:   CNII-XII intact. Strength symmetric, speech intact    Warm, dry  Incision site of right lower back with erythema and fungal rash.  No significant drainage.  No palpable fluctuance or induration.  Large right shoulder lipoma approximately 12 x 12 cm, nontender with without overlying skin changes           Tarsha Sheehan

## 2024-08-13 ENCOUNTER — HOSPITAL ENCOUNTER (EMERGENCY)
Facility: HOSPITAL | Age: 64
Discharge: HOME | End: 2024-08-13
Attending: EMERGENCY MEDICINE

## 2024-08-13 VITALS
SYSTOLIC BLOOD PRESSURE: 184 MMHG | DIASTOLIC BLOOD PRESSURE: 82 MMHG | OXYGEN SATURATION: 95 % | RESPIRATION RATE: 20 BRPM | TEMPERATURE: 98.1 F | HEART RATE: 86 BPM

## 2024-08-13 DIAGNOSIS — J20.9 ACUTE BRONCHITIS: Primary | ICD-10-CM

## 2024-08-13 PROCEDURE — 99284 EMERGENCY DEPT VISIT MOD MDM: CPT | Performed by: EMERGENCY MEDICINE

## 2024-08-13 PROCEDURE — 99284 EMERGENCY DEPT VISIT MOD MDM: CPT

## 2024-08-13 RX ADMIN — DEXAMETHASONE SODIUM PHOSPHATE 10 MG: 10 INJECTION, SOLUTION INTRAMUSCULAR; INTRAVENOUS at 04:43

## 2024-08-13 NOTE — ED PROVIDER NOTES
History  Chief Complaint   Patient presents with    Shortness of Breath     Pt states he has a chronic bronchitis problem and is out of his meds.      Albert Jensen is a 63 y.o. year old male with PMH of chronic bronchitis, HTN, schizophrenia presenting to the [facility] ED for cough and wheezing. Patient reporting history of chronic bronchitis. Patient reports increased frequency of cough productive of yellow sputum. Patient reporting wheezing and shortness of breath worsened with exertion. Patient was previously prescribed methylprednisolone and azithromycin for similar symptoms and requesting refill of the same. Patient denies fevers/chills. No chest pain, N/V or abdominal pain. No leg pain or swelling. Patient denies smoking history. Patient feels mold at home may be contributing to his symptoms. Patient has been compliant with previously prescribed albuterol treatments.      History provided by:  Medical records and patient   used: No        Prior to Admission Medications   Prescriptions Last Dose Informant Patient Reported? Taking?   Fluticasone Furoate-Vilanterol 100-25 mcg/actuation inhaler   No No   Sig: Inhale 1 puff daily Rinse mouth after use.   Patient not taking: Reported on 7/15/2024   albuterol (Proventil HFA) 90 mcg/act inhaler   No No   Sig: Inhale 1-2 puffs every 6 (six) hours as needed for wheezing or shortness of breath   azithromycin (ZITHROMAX) 250 mg tablet   Yes No   Sig: TAKE 2 TABLETS BY MOUTH TODAY, THEN TAKE 1 TABLET DAILY FOR 4 DAYS AS DIRECTED   benzonatate (TESSALON PERLES) 100 mg capsule   No No   Sig: Take 1 capsule (100 mg total) by mouth every 8 (eight) hours   Patient not taking: Reported on 7/15/2024   guaiFENesin (MUCINEX) 600 mg 12 hr tablet   No No   Sig: Take 1 tablet (600 mg total) by mouth 2 (two) times a day   Patient not taking: Reported on 7/15/2024   methylPREDNISolone 4 MG tablet therapy pack   No No   Sig: Use as directed on package   metroNIDAZOLE  (METROCREAM) 0.75 % cream   No No   Sig: Apply 1 Application topically 2 (two) times a day      Facility-Administered Medications: None       Past Medical History:   Diagnosis Date    Deafness in right ear     Hypertension        Past Surgical History:   Procedure Laterality Date    SPLENECTOMY, TOTAL         History reviewed. No pertinent family history.  I have reviewed and agree with the history as documented.    E-Cigarette/Vaping    E-Cigarette Use Never User      E-Cigarette/Vaping Substances    Nicotine No     THC No     CBD No     Flavoring No     Other No     Unknown No      Social History     Tobacco Use    Smoking status: Former     Current packs/day: 0.00     Types: Cigarettes     Quit date: 1987     Years since quittin.9    Smokeless tobacco: Never   Vaping Use    Vaping status: Never Used   Substance Use Topics    Alcohol use: Not Currently    Drug use: Not Currently       Review of Systems   Constitutional:  Negative for chills and fever.   HENT:  Negative for congestion.    Respiratory:  Positive for cough, shortness of breath and wheezing.    Cardiovascular:  Negative for chest pain and leg swelling.   Gastrointestinal:  Negative for abdominal pain, nausea and vomiting.   Musculoskeletal:  Negative for back pain.   All other systems reviewed and are negative.      Physical Exam  Physical Exam  Vitals and nursing note reviewed.   Constitutional:       General: He is not in acute distress.     Appearance: He is well-developed. He is not ill-appearing, toxic-appearing or diaphoretic.   HENT:      Head: Normocephalic and atraumatic.      Nose: No congestion or rhinorrhea.   Eyes:      General:         Right eye: No discharge.         Left eye: No discharge.   Cardiovascular:      Rate and Rhythm: Normal rate and regular rhythm.   Pulmonary:      Effort: Pulmonary effort is normal. No tachypnea, accessory muscle usage or respiratory distress.      Breath sounds: Wheezing (faint, end-expiratory)  present. No decreased breath sounds, rhonchi or rales.   Abdominal:      Palpations: Abdomen is soft.      Tenderness: There is no abdominal tenderness. There is no guarding or rebound.   Musculoskeletal:      Cervical back: Normal range of motion. No rigidity.      Right lower leg: No edema.      Left lower leg: No edema.   Skin:     General: Skin is warm.      Capillary Refill: Capillary refill takes less than 2 seconds.   Neurological:      Mental Status: He is alert and oriented to person, place, and time.   Psychiatric:         Mood and Affect: Mood and affect normal.         Vital Signs  ED Triage Vitals   Temperature Pulse Respirations Blood Pressure SpO2   08/13/24 0424 08/13/24 0424 08/13/24 0424 08/13/24 0426 08/13/24 0424   98.1 °F (36.7 °C) 86 20 (!) 184/82 95 %      Temp Source Heart Rate Source Patient Position - Orthostatic VS BP Location FiO2 (%)   08/13/24 0424 08/13/24 0424 -- 08/13/24 0424 --   Temporal Monitor  Left arm       Pain Score       08/13/24 0424       No Pain           Vitals:    08/13/24 0424 08/13/24 0426   BP:  (!) 184/82   Pulse: 86          Visual Acuity      ED Medications  Medications   dexamethasone oral liquid 10 mg 1 mL (10 mg Oral Given 8/13/24 0443)       Diagnostic Studies  Results Reviewed       None                   No orders to display              Procedures  Procedures         ED Course                                 SBIRT 22yo+      Flowsheet Row Most Recent Value   Initial Alcohol Screen: US AUDIT-C     1. How often do you have a drink containing alcohol? 0 Filed at: 08/13/2024 0430   2. How many drinks containing alcohol do you have on a typical day you are drinking?  0 Filed at: 08/13/2024 0430   3a. Male UNDER 65: How often do you have five or more drinks on one occasion? 0 Filed at: 08/13/2024 0430   Audit-C Score 0 Filed at: 08/13/2024 0430   ANJELICA: How many times in the past year have you...    Used an illegal drug or used a prescription medication for  non-medical reasons? Never Filed at: 08/13/2024 0430                      Medical Decision Making    63 y.o. male presenting for evaluation of cough/wheezing.  Patient reporting history of chronic bronchitis and current symptoms suggestive of possible mild, acute exacerbation.  No focal infiltrate on exam, do not suspect PNA.  I recommended short course of prednisone however patient declines.  I do not feel patient would benefit from azithromycin at this time.  Will treat with dose of decadron and encouraged continued breathing treatments as needed.    Disposition: I have discussed with the patient our plan to discharge them from the ED and the patient is in agreement with this plan.     Discharge Plan: Encouraged to continue breathing treatments as previously prescribed. RTED precautions emphasized. The patient was provided a written after visit summary with strict RTED precautions.     Followup: I have discussed with the patient plan to follow up with their PCP. Contact information provided in AVS.                 Disposition  Final diagnoses:   Acute bronchitis     Time reflects when diagnosis was documented in both MDM as applicable and the Disposition within this note       Time User Action Codes Description Comment    8/13/2024  4:41 AM Po Goel [J20.9] Acute bronchitis           ED Disposition       ED Disposition   Discharge    Condition   Stable    Date/Time   Tue Aug 13, 2024 0440    Comment   Albert Jensen discharge to home/self care.                   Follow-up Information       Follow up With Specialties Details Why Contact Info    Jasson Warner, DO Internal Medicine Schedule an appointment as soon as possible for a visit  To make appointment for reevaluation in 3-5 days. [address] [place] [id number] [phone]              Discharge Medication List as of 8/13/2024  4:41 AM        CONTINUE these medications which have NOT CHANGED    Details   albuterol (Proventil HFA) 90 mcg/act  inhaler Inhale 1-2 puffs every 6 (six) hours as needed for wheezing or shortness of breath, Starting Thu 6/27/2024, Normal      azithromycin (ZITHROMAX) 250 mg tablet TAKE 2 TABLETS BY MOUTH TODAY, THEN TAKE 1 TABLET DAILY FOR 4 DAYS AS DIRECTED, Historical Med      benzonatate (TESSALON PERLES) 100 mg capsule Take 1 capsule (100 mg total) by mouth every 8 (eight) hours, Starting Sun 7/30/2023, Normal      Fluticasone Furoate-Vilanterol 100-25 mcg/actuation inhaler Inhale 1 puff daily Rinse mouth after use., Starting Fri 6/21/2024, Normal      guaiFENesin (MUCINEX) 600 mg 12 hr tablet Take 1 tablet (600 mg total) by mouth 2 (two) times a day, Starting u 6/20/2024, Normal      methylPREDNISolone 4 MG tablet therapy pack Use as directed on package, Normal      metroNIDAZOLE (METROCREAM) 0.75 % cream Apply 1 Application topically 2 (two) times a day, Starting Thu 6/20/2024, Normal             No discharge procedures on file.    PDMP Review         Value Time User    PDMP Reviewed  Yes 8/1/2023 10:48 PM Harvey Aguiar DO            ED Provider  Electronically Signed by             Po Goel DO  08/13/24 [id number]

## 2024-08-13 NOTE — DISCHARGE INSTRUCTIONS
You have been seen for evaluation of cough and wheezing. Please continue your previously prescribed breathing treatments. Return to the emergency department if you develop worsening trouble breathing, fevers, chest pain or any other symptoms of concern. Please follow up with your PCP by calling the number provided.

## 2024-09-06 ENCOUNTER — OFFICE VISIT (OUTPATIENT)
Dept: SURGERY | Facility: HOSPITAL | Age: 64
End: 2024-09-06

## 2024-09-06 VITALS
SYSTOLIC BLOOD PRESSURE: 152 MMHG | DIASTOLIC BLOOD PRESSURE: 87 MMHG | HEART RATE: 85 BPM | BODY MASS INDEX: 33.74 KG/M2 | HEIGHT: 67 IN | TEMPERATURE: 97.2 F | WEIGHT: 215 LBS

## 2024-09-06 DIAGNOSIS — M79.89 MASS OF SOFT TISSUE OF SHOULDER: Primary | ICD-10-CM

## 2024-09-06 DIAGNOSIS — C44.91 SKIN CANCER, BASAL CELL: ICD-10-CM

## 2024-09-06 PROCEDURE — 99213 OFFICE O/P EST LOW 20 MIN: CPT | Performed by: PHYSICIAN ASSISTANT

## 2024-09-06 RX ORDER — SODIUM CHLORIDE, SODIUM LACTATE, POTASSIUM CHLORIDE, CALCIUM CHLORIDE 600; 310; 30; 20 MG/100ML; MG/100ML; MG/100ML; MG/100ML
125 INJECTION, SOLUTION INTRAVENOUS CONTINUOUS
OUTPATIENT
Start: 2024-10-02

## 2024-09-06 RX ORDER — CEFAZOLIN SODIUM 2 G/50ML
2000 SOLUTION INTRAVENOUS ONCE
OUTPATIENT
Start: 2024-10-02 | End: 2024-10-02

## 2024-09-06 NOTE — PROGRESS NOTES
Assessment/Plan:   Albert Jensen is a 64 y.o.male who is here for Follow-up (MASS ON RIGHT SHOULDER//PT is not having pain from the cyst on right shoulder, he does want to look into options of getting it removed. )    64-year-old M with only admitted past medical history of lung disease and skin issues who presents today for follow-up.  He was previously seen by our service in the hospital when he had a basal cell carcinoma excised from his left mid back.  At follow-up appointment he did complain of a right shoulder skin mass that has been there for many years.  The patient does not have insurance.  He was instructed to follow-up with the [facility] in [place].  He presents today again for reevaluation.  He states he does not want to be seen at the clinic.  He would like to have these issues taken care of and states he will pay out-of-pocket.  Denies any pain, drainage, history of infection at skin sites.    Skin mass right shoulder    -Large, soft, mobile, nontender soft tissue mass of right shoulder without overlying skin changes or signs of infection  -Patient states skin mass is bothersome as it continues to increase in size and rubs on clothing    Left back incisional scar at site of previous basal cell carcinoma  -Pathology from this procedure showed nodular basal cell carcinoma with 1 positive margins  -Reexcision indicated      Plan: Will plan for excision of right shoulder skin mass and reexcision of basal cell carcinoma scar.   Procedure discussed in detail with patient as well as possible risks and complications and written consent has been obtained.    Patient will require preadmission testing: Blood work including CBC and CMP and chest x-ray and EKG ordered.  Will evaluate prior to procedure    Preoperative Clearance: Will ask for medical clearance as patient notes some breathing issues, shortness of breath and cough.  He was unable to obtain his antibiotics and steroid course after last admission.   States he uses his inhalers as needed.  Patient also has history of hypertension and schizophrenia noted in his chart but does not take any medications for these diagnoses.  Will need evaluation prior to undergoing surgical procedure.     HPI:  Albert Jensen is a 64 y.o.male who was referred for evaluation of Follow-up (MASS ON RIGHT SHOULDER//PT is not having pain from the cyst on right shoulder, he does want to look into options of getting it removed. )      64-year-old M with only admitted past medical history of lung disease and skin issues who presents today for follow-up.  He was previously seen by our service in the hospital when he had a basal cell carcinoma excised from his left mid back.  At follow-up appointment he did complain of a right shoulder skin mass that has been there for many years.  The patient does not have insurance.  He was instructed to follow-up with the [facility] in [place].  He presents today again for reevaluation.  He states he does not want to be seen at the clinic.  He would like to have these issues taken care of and states he will pay out-of-pocket.  Denies any pain, drainage, history of infection at skin sites.    Patient states he is only taking pulmonary inhalers as needed.  He was unable to obtain antibiotics and steroids prescribed after last admission.  Does note shortness of breath and cough.  Patient is a former smoker.  Patient also has a history of hypertension and schizophrenia noted in his chart but does not take any medication for these diagnoses      ROS:  General ROS: negative  negative for - chills, fatigue, fever or night sweats, weight loss  Respiratory ROS: no cough, shortness of breath, or wheezing  Cardiovascular ROS: no chest pain or dyspnea on exertion  Genito-Urinary ROS: no dysuria, trouble voiding, or hematuria  Musculoskeletal ROS: negative for - gait disturbance, joint pain or muscle pain  Neurological ROS: no TIA or stroke symptoms  Skin ROS: See  HPI    ALLERGIES  Patient has no known allergies.    Current Outpatient Medications:     albuterol (Proventil HFA) 90 mcg/act inhaler, Inhale 1-2 puffs every 6 (six) hours as needed for wheezing or shortness of breath, Disp: 18 g, Rfl: 0    azithromycin (ZITHROMAX) 250 mg tablet, TAKE 2 TABLETS BY MOUTH TODAY, THEN TAKE 1 TABLET DAILY FOR 4 DAYS AS DIRECTED, Disp: , Rfl:     methylPREDNISolone 4 MG tablet therapy pack, Use as directed on package, Disp: 21 tablet, Rfl: 0    metroNIDAZOLE (METROCREAM) 0.75 % cream, Apply 1 Application topically 2 (two) times a day, Disp: 45 g, Rfl: 0    benzonatate (TESSALON PERLES) 100 mg capsule, Take 1 capsule (100 mg total) by mouth every 8 (eight) hours (Patient not taking: Reported on 7/15/2024), Disp: 21 capsule, Rfl: 0    Fluticasone Furoate-Vilanterol 100-25 mcg/actuation inhaler, Inhale 1 puff daily Rinse mouth after use. (Patient not taking: Reported on 7/15/2024), Disp: 60 blister, Rfl: 0    guaiFENesin (MUCINEX) 600 mg 12 hr tablet, Take 1 tablet (600 mg total) by mouth 2 (two) times a day (Patient not taking: Reported on 7/15/2024), Disp: 14 tablet, Rfl: 0  Past Medical History:   Diagnosis Date    Deafness in right ear     Hypertension      Past Surgical History:   Procedure Laterality Date    SPLENECTOMY, TOTAL       History reviewed. No pertinent family history.   reports that he quit smoking about 36 years ago. His smoking use included cigarettes. He has never used smokeless tobacco. He reports that he does not currently use alcohol. He reports that he does not currently use drugs.    PHYSICAL EXAM  Vitals:    09/06/24 1126   BP: 152/87   Pulse: 85   Temp: (!) 97.2 °F (36.2 °C)       General Appearance:    Alert, cooperative, no distress, anxious affect, pressured speech   Head:    Normocephalic without obvious abnormality   Eyes:    Conjunctiva/corneas clear, EOM's intact        Neck:   Supple, no adenopathy, no JVD   Back:     Symmetric, no spinal or CVA tenderness    Lungs:     Clear to auscultation bilaterally, no wheezing or rhonchi   Heart:    Regular rate and rhythm, S1 and S2 normal, no murmur   Abdomen:     Benign, no rebound or guarding. Obese, soft, surgical scar   Extremities:   Extremities normal. No clubbing, cyanosis or edema   Psych:   Normal Affect, AOx3.    Neurologic:  Skin:   CNII-XII intact. Strength symmetric, speech intact    Warm, dry  Large approximate 15 cm soft tissue mass right shoulder, mobile and nontender, no overlying skin changes  Incisional scar left mid back, well-healed           Tarsha Sheehan

## 2024-09-11 ENCOUNTER — APPOINTMENT (OUTPATIENT)
Dept: LAB | Facility: HOSPITAL | Age: 64
End: 2024-09-11

## 2024-09-11 ENCOUNTER — HOSPITAL ENCOUNTER (OUTPATIENT)
Dept: RADIOLOGY | Facility: HOSPITAL | Age: 64
Discharge: HOME | End: 2024-09-11

## 2024-09-11 DIAGNOSIS — M79.89 MASS OF SOFT TISSUE OF SHOULDER: ICD-10-CM

## 2024-09-11 LAB
ALBUMIN SERPL BCG-MCNC: 4.9 G/DL (ref 3.5–5)
ALP SERPL-CCNC: 90 U/L (ref 34–104)
ALT SERPL W P-5'-P-CCNC: 22 U/L (ref 7–52)
ANION GAP SERPL CALCULATED.3IONS-SCNC: 10 MMOL/L (ref 4–13)
AST SERPL W P-5'-P-CCNC: 18 U/L (ref 13–39)
BASOPHILS # BLD AUTO: 0.04 THOUSANDS/ΜL (ref 0–0.1)
BASOPHILS NFR BLD AUTO: 0 % (ref 0–1)
BILIRUB SERPL-MCNC: 0.51 MG/DL (ref 0.2–1)
BUN SERPL-MCNC: 21 MG/DL (ref 5–25)
CALCIUM SERPL-MCNC: 10.3 MG/DL (ref 8.4–10.2)
CHLORIDE SERPL-SCNC: 101 MMOL/L (ref 96–108)
CO2 SERPL-SCNC: 28 MMOL/L (ref 21–32)
CREAT SERPL-MCNC: 1.1 MG/DL (ref 0.6–1.3)
EOSINOPHIL # BLD AUTO: 0.01 THOUSAND/ΜL (ref 0–0.61)
EOSINOPHIL NFR BLD AUTO: 0 % (ref 0–6)
ERYTHROCYTE [DISTWIDTH] IN BLOOD BY AUTOMATED COUNT: 13.8 % (ref 11.6–15.1)
GFR SERPL CREATININE-BSD FRML MDRD: 70 ML/MIN/1.73SQ M
GLUCOSE SERPL-MCNC: 152 MG/DL (ref 65–140)
HCT VFR BLD AUTO: 46.5 % (ref 36.5–49.3)
HGB BLD-MCNC: 15.6 G/DL (ref 12–17)
IMM GRANULOCYTES # BLD AUTO: 0.05 THOUSAND/UL (ref 0–0.2)
IMM GRANULOCYTES NFR BLD AUTO: 0 % (ref 0–2)
LYMPHOCYTES # BLD AUTO: 1.5 THOUSANDS/ΜL (ref 0.6–4.47)
LYMPHOCYTES NFR BLD AUTO: 13 % (ref 14–44)
MCH RBC QN AUTO: 31.6 PG (ref 26.8–34.3)
MCHC RBC AUTO-ENTMCNC: 33.5 G/DL (ref 31.4–37.4)
MCV RBC AUTO: 94 FL (ref 82–98)
MONOCYTES # BLD AUTO: 0.76 THOUSAND/ΜL (ref 0.17–1.22)
MONOCYTES NFR BLD AUTO: 7 % (ref 4–12)
NEUTROPHILS # BLD AUTO: 8.81 THOUSANDS/ΜL (ref 1.85–7.62)
NEUTS SEG NFR BLD AUTO: 80 % (ref 43–75)
NRBC BLD AUTO-RTO: 0 /100 WBCS
PLATELET # BLD AUTO: 341 THOUSANDS/UL (ref 149–390)
PMV BLD AUTO: 10.8 FL (ref 8.9–12.7)
POTASSIUM SERPL-SCNC: 4.4 MMOL/L (ref 3.5–5.3)
PROT SERPL-MCNC: 8 G/DL (ref 6.4–8.4)
RBC # BLD AUTO: 4.93 MILLION/UL (ref 3.88–5.62)
SODIUM SERPL-SCNC: 139 MMOL/L (ref 135–147)
WBC # BLD AUTO: 11.17 THOUSAND/UL (ref 4.31–10.16)

## 2024-09-11 PROCEDURE — 71046 X-RAY EXAM CHEST 2 VIEWS: CPT

## 2024-09-11 PROCEDURE — 36415 COLL VENOUS BLD VENIPUNCTURE: CPT

## 2024-09-11 PROCEDURE — 80053 COMPREHEN METABOLIC PANEL: CPT

## 2024-09-11 PROCEDURE — 85025 COMPLETE CBC W/AUTO DIFF WBC: CPT

## 2024-09-12 ENCOUNTER — HOSPITAL ENCOUNTER (EMERGENCY)
Facility: HOSPITAL | Age: 64
Discharge: HOME | End: 2024-09-12
Attending: EMERGENCY MEDICINE

## 2024-09-12 ENCOUNTER — APPOINTMENT (OUTPATIENT)
Dept: RADIOLOGY | Facility: HOSPITAL | Age: 64
End: 2024-09-12

## 2024-09-12 VITALS
HEART RATE: 75 BPM | DIASTOLIC BLOOD PRESSURE: 72 MMHG | SYSTOLIC BLOOD PRESSURE: 176 MMHG | RESPIRATION RATE: 23 BRPM | TEMPERATURE: 98.7 F | OXYGEN SATURATION: 96 %

## 2024-09-12 DIAGNOSIS — R73.09 ELEVATED RANDOM BLOOD GLUCOSE LEVEL: ICD-10-CM

## 2024-09-12 DIAGNOSIS — J44.1 COPD EXACERBATION (HCC): ICD-10-CM

## 2024-09-12 DIAGNOSIS — R03.0 ELEVATED BLOOD PRESSURE READING: Primary | ICD-10-CM

## 2024-09-12 LAB
2HR DELTA HS TROPONIN: 0 NG/L
ALBUMIN SERPL BCG-MCNC: 4.7 G/DL (ref 3.5–5)
ALP SERPL-CCNC: 85 U/L (ref 34–104)
ALT SERPL W P-5'-P-CCNC: 21 U/L (ref 7–52)
ANION GAP SERPL CALCULATED.3IONS-SCNC: 11 MMOL/L (ref 4–13)
AST SERPL W P-5'-P-CCNC: 15 U/L (ref 13–39)
BASOPHILS # BLD AUTO: 0.03 THOUSANDS/ΜL (ref 0–0.1)
BASOPHILS NFR BLD AUTO: 0 % (ref 0–1)
BILIRUB SERPL-MCNC: 0.53 MG/DL (ref 0.2–1)
BNP SERPL-MCNC: 62 PG/ML (ref 0–100)
BUN SERPL-MCNC: 18 MG/DL (ref 5–25)
CALCIUM SERPL-MCNC: 9.6 MG/DL (ref 8.4–10.2)
CARDIAC TROPONIN I PNL SERPL HS: 5 NG/L
CARDIAC TROPONIN I PNL SERPL HS: 5 NG/L
CHLORIDE SERPL-SCNC: 102 MMOL/L (ref 96–108)
CO2 SERPL-SCNC: 28 MMOL/L (ref 21–32)
CREAT SERPL-MCNC: 1.1 MG/DL (ref 0.6–1.3)
EOSINOPHIL # BLD AUTO: 0.05 THOUSAND/ΜL (ref 0–0.61)
EOSINOPHIL NFR BLD AUTO: 0 % (ref 0–6)
ERYTHROCYTE [DISTWIDTH] IN BLOOD BY AUTOMATED COUNT: 13.8 % (ref 11.6–15.1)
FLUAV AG UPPER RESP QL IA.RAPID: NEGATIVE
FLUBV AG UPPER RESP QL IA.RAPID: NEGATIVE
GFR SERPL CREATININE-BSD FRML MDRD: 70 ML/MIN/1.73SQ M
GLUCOSE SERPL-MCNC: 227 MG/DL (ref 65–140)
HCT VFR BLD AUTO: 45.3 % (ref 36.5–49.3)
HGB BLD-MCNC: 15.6 G/DL (ref 12–17)
IMM GRANULOCYTES # BLD AUTO: 0.06 THOUSAND/UL (ref 0–0.2)
IMM GRANULOCYTES NFR BLD AUTO: 1 % (ref 0–2)
LYMPHOCYTES # BLD AUTO: 1.86 THOUSANDS/ΜL (ref 0.6–4.47)
LYMPHOCYTES NFR BLD AUTO: 16 % (ref 14–44)
MCH RBC QN AUTO: 32.6 PG (ref 26.8–34.3)
MCHC RBC AUTO-ENTMCNC: 34.4 G/DL (ref 31.4–37.4)
MCV RBC AUTO: 95 FL (ref 82–98)
MONOCYTES # BLD AUTO: 0.64 THOUSAND/ΜL (ref 0.17–1.22)
MONOCYTES NFR BLD AUTO: 5 % (ref 4–12)
NEUTROPHILS # BLD AUTO: 9.25 THOUSANDS/ΜL (ref 1.85–7.62)
NEUTS SEG NFR BLD AUTO: 78 % (ref 43–75)
NRBC BLD AUTO-RTO: 0 /100 WBCS
PLATELET # BLD AUTO: 317 THOUSANDS/UL (ref 149–390)
PMV BLD AUTO: 10.1 FL (ref 8.9–12.7)
POTASSIUM SERPL-SCNC: 3.7 MMOL/L (ref 3.5–5.3)
PROT SERPL-MCNC: 7.5 G/DL (ref 6.4–8.4)
RBC # BLD AUTO: 4.78 MILLION/UL (ref 3.88–5.62)
SARS-COV+SARS-COV-2 AG RESP QL IA.RAPID: NEGATIVE
SODIUM SERPL-SCNC: 141 MMOL/L (ref 135–147)
WBC # BLD AUTO: 11.89 THOUSAND/UL (ref 4.31–10.16)

## 2024-09-12 PROCEDURE — 99285 EMERGENCY DEPT VISIT HI MDM: CPT | Performed by: EMERGENCY MEDICINE

## 2024-09-12 PROCEDURE — 71046 X-RAY EXAM CHEST 2 VIEWS: CPT

## 2024-09-12 PROCEDURE — 93005 ELECTROCARDIOGRAM TRACING: CPT

## 2024-09-12 PROCEDURE — 87804 INFLUENZA ASSAY W/OPTIC: CPT | Performed by: EMERGENCY MEDICINE

## 2024-09-12 PROCEDURE — 85025 COMPLETE CBC W/AUTO DIFF WBC: CPT | Performed by: EMERGENCY MEDICINE

## 2024-09-12 PROCEDURE — 80053 COMPREHEN METABOLIC PANEL: CPT | Performed by: EMERGENCY MEDICINE

## 2024-09-12 PROCEDURE — 94640 AIRWAY INHALATION TREATMENT: CPT

## 2024-09-12 PROCEDURE — 36415 COLL VENOUS BLD VENIPUNCTURE: CPT

## 2024-09-12 PROCEDURE — 87811 SARS-COV-2 COVID19 W/OPTIC: CPT | Performed by: EMERGENCY MEDICINE

## 2024-09-12 PROCEDURE — 99285 EMERGENCY DEPT VISIT HI MDM: CPT

## 2024-09-12 PROCEDURE — 84484 ASSAY OF TROPONIN QUANT: CPT | Performed by: EMERGENCY MEDICINE

## 2024-09-12 PROCEDURE — 83880 ASSAY OF NATRIURETIC PEPTIDE: CPT | Performed by: EMERGENCY MEDICINE

## 2024-09-12 RX ORDER — PREDNISONE 20 MG/1
40 TABLET ORAL ONCE
Status: DISCONTINUED | OUTPATIENT
Start: 2024-09-12 | End: 2024-09-12

## 2024-09-12 RX ORDER — ALBUTEROL SULFATE 0.83 MG/ML
5 SOLUTION RESPIRATORY (INHALATION) ONCE
Status: COMPLETED | OUTPATIENT
Start: 2024-09-12 | End: 2024-09-12

## 2024-09-12 RX ORDER — METHYLPREDNISOLONE 4 MG
TABLET, DOSE PACK ORAL
Qty: 21 TABLET | Refills: 0 | Status: SHIPPED | OUTPATIENT
Start: 2024-09-12

## 2024-09-12 RX ORDER — ALBUTEROL SULFATE 90 UG/1
2 INHALANT RESPIRATORY (INHALATION) ONCE
Status: COMPLETED | OUTPATIENT
Start: 2024-09-12 | End: 2024-09-12

## 2024-09-12 RX ORDER — AZITHROMYCIN 250 MG/1
TABLET, FILM COATED ORAL
Qty: 6 TABLET | Refills: 0 | Status: SHIPPED | OUTPATIENT
Start: 2024-09-12 | End: 2024-09-16

## 2024-09-12 RX ORDER — BENZONATATE 100 MG/1
100 CAPSULE ORAL ONCE
Status: DISCONTINUED | OUTPATIENT
Start: 2024-09-12 | End: 2024-09-13 | Stop reason: HOSPADM

## 2024-09-12 RX ADMIN — ALBUTEROL SULFATE 2 PUFF: 90 AEROSOL, METERED RESPIRATORY (INHALATION) at 22:48

## 2024-09-12 RX ADMIN — IPRATROPIUM BROMIDE 0.5 MG: 0.5 SOLUTION RESPIRATORY (INHALATION) at 22:04

## 2024-09-12 RX ADMIN — ALBUTEROL SULFATE 5 MG: 2.5 SOLUTION RESPIRATORY (INHALATION) at 22:04

## 2024-09-13 LAB
ATRIAL RATE: 78 BPM
ATRIAL RATE: 93 BPM
P AXIS: 46 DEGREES
P AXIS: 66 DEGREES
PR INTERVAL: 158 MS
PR INTERVAL: 162 MS
QRS AXIS: 10 DEGREES
QRS AXIS: 45 DEGREES
QRSD INTERVAL: 76 MS
QRSD INTERVAL: 76 MS
QT INTERVAL: 364 MS
QT INTERVAL: 392 MS
QTC INTERVAL: 446 MS
QTC INTERVAL: 452 MS
T WAVE AXIS: 58 DEGREES
T WAVE AXIS: 59 DEGREES
VENTRICULAR RATE: 78 BPM
VENTRICULAR RATE: 93 BPM

## 2024-09-13 PROCEDURE — 93010 ELECTROCARDIOGRAM REPORT: CPT | Performed by: INTERNAL MEDICINE

## 2024-09-13 NOTE — DISCHARGE INSTRUCTIONS
You have some abnormalities on your lab work today which will need follow-up with your primary care provider, your blood pressure was elevated today's visit, please have your primary care provider recheck your blood pressure and address whether you need change in chronic medications    Your random glucose level was elevated today's visit, please have your primary care provider evaluate you for possibility of prediabetes or diabetes

## 2024-09-13 NOTE — ED PROVIDER NOTES
1. Elevated blood pressure reading    2. Elevated random blood glucose level    3. COPD exacerbation (HCC)      ED Disposition       ED Disposition   Discharge    Condition   Stable    Date/Time   Thu Sep 12, 2024 10:41 PM    Comment   Albert Mikey discharge to home/self care.                   Assessment & Plan       Medical Decision Making  64-year-old male with shortness of breath, differential diagnosis includes bronchospasm COPD exacerbation, ACS, arrhythmia, fluid overload, CHF, pneumothorax, pneumonia  Discussed with the patient that I am unable to admit him for social reasons at this point but will discuss with the ED  in the morning about reaching out to him to see if we can provide any additional resources    Case Management contacted via [employer] reach out to the patient with any available resources    Amount and/or Complexity of Data Reviewed  Labs: ordered. Decision-making details documented in ED Course.  Radiology: independent interpretation performed.    Risk  Prescription drug management.  Diagnosis or treatment significantly limited by social determinants of health.          HEART Risk Score      Flowsheet Row Most Recent Value   Heart Score Risk Calculator    History 0 Filed at: 09/12/2024 2246   ECG 0 Filed at: 09/12/2024 2246   Age 2 Filed at: 09/12/2024 2246   Risk Factors 1 Filed at: 09/12/2024 2246   Troponin 0 Filed at: 09/12/2024 2246   HEART Score 3 Filed at: 09/12/2024 2246               ED Course as of 09/12/24 2250   Thu Sep 12, 2024   2237 Patient resting comfortably in no acute distress, does have significant hypertension however negative delta troponin, normal BNP, no evidence of fluid overload on x-ray, he is in no acute respiratory distress with mild wheezing, he was given albuterol and will start on steroids for likely COPD exacerbation otherwise stable for discharge, did discuss with the patient that I will reach out to social work in the morning to reach  out to the patient.  There is no clear indication for inpatient evaluation or treatment at this point able for discharge at this time   2242 GLUCOSE(!): 227  Have mildly elevated glucose level, it is nonfasting he does not have history of diabetes, previous sugar yesterday was 157, low suspicion for onset diabetes at this point, will have patient follow-up with PCP for further evaluation otherwise stable for discharge at this time       Medications   benzonatate (TESSALON PERLES) capsule 100 mg (100 mg Oral Not Given 9/12/24 2203)   albuterol inhalation solution 5 mg (5 mg Nebulization Given 9/12/24 2204)   ipratropium (ATROVENT) 0.02 % inhalation solution 0.5 mg (0.5 mg Nebulization Given 9/12/24 2204)   albuterol (PROVENTIL HFA,VENTOLIN HFA) inhaler 2 puff (2 puffs Inhalation Given 9/12/24 2248)       History of Present Illness       64-year-old male with history of COPD presents for evaluation of cough, wheezing, no fevers, no chills, no chest pain or shortness of breath.  Patient states that he is concerned about his current living environment he states that he lives alone in an apartment in [place] however the apartment has bad laundry facilities and he has an appointment scheduled for surgery to remove a cyst in his right shoulder with Dr. Mckeon on October 2 and would like to be admitted to the hospital until the procedure to ensure that he does not get an infection.  Otherwise he states that he lives alone and he does not get along with his family, no concern for abuse at his house this point, no suicidal homicidal ideation            Review of Systems   Constitutional:  Negative for appetite change, chills and fever.   HENT:  Negative for rhinorrhea and sore throat.    Eyes:  Negative for photophobia and visual disturbance.   Respiratory:  Positive for cough and shortness of breath.    Cardiovascular:  Negative for chest pain and palpitations.   Gastrointestinal:  Negative for abdominal pain and diarrhea.    Genitourinary:  Negative for dysuria, frequency and urgency.   Skin:  Negative for rash.   Neurological:  Negative for dizziness and weakness.   All other systems reviewed and are negative.          Objective     ED Triage Vitals [09/12/24 1737]   Temperature Pulse Blood Pressure Respirations SpO2 Patient Position - Orthostatic VS   98.7 °F (37.1 °C) 82 (!) 181/100 20 96 % Sitting      Temp Source Heart Rate Source BP Location FiO2 (%) Pain Score    Temporal -- Right arm -- No Pain        Physical Exam  Vitals and nursing note reviewed.   Constitutional:       Appearance: He is well-developed.   HENT:      Head: Normocephalic and atraumatic.      Right Ear: External ear normal.      Left Ear: External ear normal.      Mouth/Throat:      Mouth: Oropharynx is clear and moist.   Eyes:      Extraocular Movements: EOM normal.      Conjunctiva/sclera: Conjunctivae normal.      Pupils: Pupils are equal, round, and reactive to light.   Neck:      Vascular: No JVD.      Trachea: No tracheal deviation.   Cardiovascular:      Rate and Rhythm: Normal rate and regular rhythm.      Heart sounds: Normal heart sounds. No murmur heard.     No friction rub. No gallop.   Pulmonary:      Effort: Pulmonary effort is normal. No respiratory distress.      Breath sounds: No stridor. Decreased breath sounds and wheezing present. No rales.   Abdominal:      General: There is no distension.      Palpations: Abdomen is soft. There is no mass.      Tenderness: There is no abdominal tenderness. There is no guarding or rebound.   Musculoskeletal:         General: No edema. Normal range of motion.      Cervical back: Normal range of motion and neck supple.      Comments: Cyst to the right shoulder with mild overlying erythema no overlying warmth, no discharge   Skin:     General: Skin is warm and dry.      Coloration: Skin is not pale.      Findings: No erythema or rash.   Neurological:      Mental Status: He is alert and oriented to person,  place, and time.      Cranial Nerves: No cranial nerve deficit.   Psychiatric:         Mood and Affect: Mood and affect normal.         Labs Reviewed   CBC AND DIFFERENTIAL - Abnormal       Result Value    WBC 11.89 (*)     RBC 4.78      Hemoglobin 15.6      Hematocrit 45.3      MCV 95      MCH 32.6      MCHC 34.4      RDW 13.8      MPV 10.1      Platelets 317      nRBC 0      Segmented % 78 (*)     Immature Grans % 1      Lymphocytes % 16      Monocytes % 5      Eosinophils Relative 0      Basophils Relative 0      Absolute Neutrophils 9.25 (*)     Absolute Immature Grans 0.06      Absolute Lymphocytes 1.86      Absolute Monocytes 0.64      Eosinophils Absolute 0.05      Basophils Absolute 0.03     COMPREHENSIVE METABOLIC PANEL - Abnormal    Sodium 141      Potassium 3.7      Chloride 102      CO2 28      ANION GAP 11      BUN 18      Creatinine 1.10      Glucose 227 (*)     Calcium 9.6      AST 15      ALT 21      Alkaline Phosphatase 85      Total Protein 7.5      Albumin 4.7      Total Bilirubin 0.53      eGFR 70      Narrative:     National Kidney Disease Foundation guidelines for Chronic Kidney Disease (CKD):     Stage 1 with normal or high GFR (GFR > 90 mL/min/1.73 square meters)    Stage 2 Mild CKD (GFR = 60-89 mL/min/1.73 square meters)    Stage 3A Moderate CKD (GFR = 45-59 mL/min/1.73 square meters)    Stage 3B Moderate CKD (GFR = 30-44 mL/min/1.73 square meters)    Stage 4 Severe CKD (GFR = 15-29 mL/min/1.73 square meters)    Stage 5 End Stage CKD (GFR <15 mL/min/1.73 square meters)  Note: GFR calculation is accurate only with a steady state creatinine   COVID-19/INFLUENZA A/B RAPID ANTIGEN (30 MIN.TAT) - Normal    SARS COV Rapid Antigen Negative      Influenza A Rapid Antigen Negative      Influenza B Rapid Antigen Negative      Narrative:     This test has been performed using the [employer] Di 2 FLU+SARS Antigen test under the Emergency Use Authorization (EUA). This test has been validated by the   and verified by the performing laboratory. The Di uses lateral flow immunofluorescent sandwich assay to detect SARS-COV, Influenza A and Influenza B Antigen.     The [employer] Di 2 SARS Antigen test does not differentiate between SARS-CoV and SARS-CoV-2.     Negative results are presumptive and may be confirmed with a molecular assay, if necessary, for patient management. Negative results do not rule out SARS-CoV-2 or influenza infection and should not be used as the sole basis for treatment or patient management decisions. A negative test result may occur if the level of antigen in a sample is below the limit of detection of this test.     Positive results are indicative of the presence of viral antigens, but do not rule out bacterial infection or co-infection with other viruses.     All test results should be used as an adjunct to clinical observations and other information available to the provider.    FOR PEDIATRIC PATIENTS - copy/paste COVID Guidelines URL to browser: [email or web address]   HS TROPONIN I 0HR - Normal    hs TnI 0hr 5     HS TROPONIN I 2HR - Normal    hs TnI 2hr 5      Delta 2hr hsTnI 0     B-TYPE NATRIURETIC PEPTIDE (BNP) - Normal    BNP 62       XR chest pa and lateral   ED Interpretation by Chelsie Mena DO (09/12 4565)   This study was ordered and independently reviewed by me    No acute findings noted             Procedures       Chelsie Mena DO  09/12/24 4429

## 2024-09-27 ENCOUNTER — NURSE TRIAGE (OUTPATIENT)
Age: 64
End: 2024-09-27

## 2024-09-27 NOTE — TELEPHONE ENCOUNTER
"Pt's PCP, Dr. Grullon, called in requesting to speak with Pretty. PCP reporting that she received medical clearance request but is unable to provide clearance because she has not seen Pt.    Pt scheduled for Procedure: EXCISION BIOPSY TISSUE LESION/MASS RIGHT SHOULDER/ EXCISION SCAR LEFT BACK (Shoulder) on 10/2/24 with Dr. Mckeon.    Reason for Disposition   Information only question and nurse able to answer    Answer Assessment - Initial Assessment Questions  1. REASON FOR CALL or QUESTION: \"What is your reason for calling today?\" or \"How can I best help you?\" or \"What question do you have that I can help answer?\"      PCP cannot provide clearance    Protocols used: Information Only Call - No Triage-ADULT-[place]    "

## 2024-09-30 ENCOUNTER — TELEPHONE (OUTPATIENT)
Dept: SURGERY | Facility: HOSPITAL | Age: 64
End: 2024-09-30

## 2024-09-30 NOTE — TELEPHONE ENCOUNTER
Reached out to emerg contact sister Jennifer.She will try to see what his intentions are as far as Surgery. We may need to reschedule this due to needing Medical clearance.

## 2024-10-01 ENCOUNTER — HOSPITAL ENCOUNTER (EMERGENCY)
Facility: HOSPITAL | Age: 64
Discharge: HOME | End: 2024-10-01
Attending: EMERGENCY MEDICINE

## 2024-10-01 VITALS
HEIGHT: 67 IN | TEMPERATURE: 97.6 F | BODY MASS INDEX: 33.74 KG/M2 | RESPIRATION RATE: 18 BRPM | HEART RATE: 75 BPM | WEIGHT: 215 LBS | DIASTOLIC BLOOD PRESSURE: 82 MMHG | OXYGEN SATURATION: 95 % | SYSTOLIC BLOOD PRESSURE: 177 MMHG

## 2024-10-01 DIAGNOSIS — R22.30 MASS OF SHOULDER REGION: Primary | ICD-10-CM

## 2024-10-01 PROCEDURE — 99284 EMERGENCY DEPT VISIT MOD MDM: CPT

## 2024-10-01 PROCEDURE — 99282 EMERGENCY DEPT VISIT SF MDM: CPT

## 2024-10-01 NOTE — DISCHARGE INSTRUCTIONS
Please schedule an appointment with your primary care provider or with any of the primary care provider from the list that has been provided to you, to gain medical clearance.  Please reschedule your surgery of the general surgeon once this has been obtained.    Please return to the emergency department if you are experiencing fever, chills, chest pain, shortness of breath or any new or concerning symptoms.

## 2024-10-01 NOTE — ED PROVIDER NOTES
Final diagnoses:   Mass of shoulder region     ED Disposition       ED Disposition   Discharge    Condition   Stable    Date/Time   Tue Oct 1, 2024  4:50 PM    Comment   Albert Jensen discharge to home/self care.                   Assessment & Plan       Medical Decision Making  Patient overall well-appearing, in no acute distress. The patient denies any associated symptoms such as fever, chills, pain, drainage or changes in the overlying skin over the right shoulder mass.  NVI. After reviewing the patient's history and physical examination, there are no acute issues requiring intervention or admission at this time. Therefore, patient will be discharged with resources to follow-up with his primary care provider to facilitate obtaining the necessary medical clearance for surgery and to address any further concerns related to the mass.    Return precautions discussed, verbally, as well as written in the AVS, with full understanding and no other questions.             Medications - No data to display    ED Risk Strat Scores                           SBIRT 20yo+      Flowsheet Row Most Recent Value   Initial Alcohol Screen: US AUDIT-C     1. How often do you have a drink containing alcohol? 0 Filed at: 10/01/2024 1629   2. How many drinks containing alcohol do you have on a typical day you are drinking?  0 Filed at: 10/01/2024 1629   3a. Male UNDER 65: How often do you have five or more drinks on one occasion? 0 Filed at: 10/01/2024 1629   3b. FEMALE Any Age, or MALE 65+: How often do you have 4 or more drinks on one occassion? 0 Filed at: 10/01/2024 1629   Audit-C Score 0 Filed at: 10/01/2024 1629   ANJELICA: How many times in the past year have you...    Used an illegal drug or used a prescription medication for non-medical reasons? Never Filed at: 10/01/2024 1629                            History of Present Illness       Chief Complaint   Patient presents with    Mass     Pt reports right shoulder mass. Pt reports mass  has been there since . Pt would like mass removed       Past Medical History:   Diagnosis Date    Deafness in right ear     Hypertension       Past Surgical History:   Procedure Laterality Date    SPLENECTOMY, TOTAL        History reviewed. No pertinent family history.   Social History     Tobacco Use    Smoking status: Former     Current packs/day: 0.00     Types: Cigarettes     Quit date: 1987     Years since quittin.0    Smokeless tobacco: Never   Vaping Use    Vaping status: Never Used   Substance Use Topics    Alcohol use: Not Currently    Drug use: Not Currently      E-Cigarette/Vaping    E-Cigarette Use Never User       E-Cigarette/Vaping Substances    Nicotine No     THC No     CBD No     Flavoring No     Other No     Unknown No       I have reviewed and agree with the history as documented.     Patient is a 64-year-old male presenting to the emergency department for evaluation of right shoulder mass that patient reports has been present since .  Patient is here today because he would like to have it removed.  According to chart review, patient was scheduled to have the mass removed by general surgery tomorrow however the surgery was canceled as patient allegedly failed to get his medical clearance. He denies fever, chills, pain, drainage, history of infection at skin sites, changes to the overlying skin or changes to the mass.          Review of Systems   Constitutional:  Negative for chills and fever.   Eyes:  Negative for visual disturbance.   Respiratory:  Negative for cough and shortness of breath.    Cardiovascular:  Negative for chest pain.   Gastrointestinal:  Negative for nausea and vomiting.   Musculoskeletal:  Negative for arthralgias, back pain and myalgias.   Skin:  Negative for color change and rash.        Mass   Neurological:  Negative for tremors, weakness and numbness.   All other systems reviewed and are negative.          Objective       ED Triage Vitals [10/01/24 1631]    Temperature Pulse Blood Pressure Respirations SpO2 Patient Position - Orthostatic VS   97.6 °F (36.4 °C) 86 (!) 193/89 18 95 % Sitting      Temp Source Heart Rate Source BP Location FiO2 (%) Pain Score    Temporal Monitor Left arm -- No Pain      Vitals      Date and Time Temp Pulse SpO2 Resp BP Pain Score FACES Pain Rating User   10/01/24 1646 -- 75 95 % 18 177/82 -- -- TH   10/01/24 1631 97.6 °F (36.4 °C) 86 95 % 18 193/89 No Pain -- CM            Physical Exam  Vitals and nursing note reviewed.   Constitutional:       General: He is not in acute distress.     Appearance: Normal appearance. He is well-developed. He is not ill-appearing.   HENT:      Head: Normocephalic and atraumatic.   Eyes:      Conjunctiva/sclera: Conjunctivae normal.   Pulmonary:      Effort: Pulmonary effort is normal.   Musculoskeletal:         General: Normal range of motion.      Right shoulder: Deformity present. No tenderness. Normal range of motion. Normal strength. Normal pulse.      Cervical back: Neck supple.      Comments: Large, soft, mobile, nontender soft tissue mass of right shoulder without overlying skin changes or signs of infection   Skin:     General: Skin is warm and dry.      Capillary Refill: Capillary refill takes less than 2 seconds.      Findings: No erythema or rash.   Neurological:      Mental Status: He is alert.   Psychiatric:         Mood and Affect: Mood normal.         Results Reviewed       None            No orders to display       Procedures    ED Medication and Procedure Management   Prior to Admission Medications   Prescriptions Last Dose Informant Patient Reported? Taking?   Fluticasone Furoate-Vilanterol 100-25 mcg/actuation inhaler   No No   Sig: Inhale 1 puff daily Rinse mouth after use.   Patient not taking: Reported on 7/15/2024   albuterol (Proventil HFA) 90 mcg/act inhaler   No No   Sig: Inhale 1-2 puffs every 6 (six) hours as needed for wheezing or shortness of breath   benzonatate (TESSALON  PERLES) 100 mg capsule   No No   Sig: Take 1 capsule (100 mg total) by mouth every 8 (eight) hours   Patient not taking: Reported on 7/15/2024   guaiFENesin (MUCINEX) 600 mg 12 hr tablet   No No   Sig: Take 1 tablet (600 mg total) by mouth 2 (two) times a day   Patient not taking: Reported on 7/15/2024   methylPREDNISolone 4 MG tablet therapy pack   No No   Sig: Use as directed on package   methylPREDNISolone 4 MG tablet therapy pack   No No   Sig: Use as directed on package   metroNIDAZOLE (METROCREAM) 0.75 % cream   No No   Sig: Apply 1 Application topically 2 (two) times a day      Facility-Administered Medications: None     Discharge Medication List as of 10/1/2024  5:03 PM        CONTINUE these medications which have NOT CHANGED    Details   albuterol (Proventil HFA) 90 mcg/act inhaler Inhale 1-2 puffs every 6 (six) hours as needed for wheezing or shortness of breath, Starting Thu 6/27/2024, Normal      benzonatate (TESSALON PERLES) 100 mg capsule Take 1 capsule (100 mg total) by mouth every 8 (eight) hours, Starting Sun 7/30/2023, Normal      Fluticasone Furoate-Vilanterol 100-25 mcg/actuation inhaler Inhale 1 puff daily Rinse mouth after use., Starting Fri 6/21/2024, Normal      guaiFENesin (MUCINEX) 600 mg 12 hr tablet Take 1 tablet (600 mg total) by mouth 2 (two) times a day, Starting Thu 6/20/2024, Normal      !! methylPREDNISolone 4 MG tablet therapy pack Use as directed on package, Normal      !! methylPREDNISolone 4 MG tablet therapy pack Use as directed on package, Normal      metroNIDAZOLE (METROCREAM) 0.75 % cream Apply 1 Application topically 2 (two) times a day, Starting u 6/20/2024, Normal       !! - Potential duplicate medications found. Please discuss with provider.        No discharge procedures on file.  ED SEPSIS DOCUMENTATION   Time reflects when diagnosis was documented in both MDM as applicable and the Disposition within this note       Time User Action Codes Description Comment     10/1/2024  5:02 PM Akiko Rahman Add [R22.31] Mass of skin of right shoulder     10/1/2024  5:02 PM Akiko Rahman Remove [R22.31] Mass of skin of right shoulder     10/1/2024  5:02 PM Akiko Rahman Add [R22.30] Mass of shoulder region                  Akiko Rahman PA-C  10/02/24 0029

## 2024-10-01 NOTE — TELEPHONE ENCOUNTER
Patient's sister Jennifer (emerg contact) called to see if we were able to get in touch with the patient and also to confirm that the procedure for tomorrow was canceled.    As noted in the chart, the procedure and postop visit were canceled.     If the patient does call back, Jennifer is kindly requesting a call back. He has not answered her phone calls or the door. She is going to keep trying to reach Albert but stated it is not unusual to not be able to reach him.

## 2024-11-18 ENCOUNTER — HOSPITAL ENCOUNTER (EMERGENCY)
Facility: HOSPITAL | Age: 64
Discharge: HOME | End: 2024-11-19
Attending: EMERGENCY MEDICINE

## 2024-11-18 ENCOUNTER — APPOINTMENT (EMERGENCY)
Dept: RADIOLOGY | Facility: HOSPITAL | Age: 64
End: 2024-11-18

## 2024-11-18 DIAGNOSIS — J44.1 COPD EXACERBATION (HCC): Primary | ICD-10-CM

## 2024-11-18 DIAGNOSIS — J20.9 ACUTE BRONCHITIS WITH WHEEZING: ICD-10-CM

## 2024-11-18 LAB
FLUAV AG UPPER RESP QL IA.RAPID: NEGATIVE
FLUBV AG UPPER RESP QL IA.RAPID: NEGATIVE
SARS-COV+SARS-COV-2 AG RESP QL IA.RAPID: NEGATIVE

## 2024-11-18 PROCEDURE — 87804 INFLUENZA ASSAY W/OPTIC: CPT | Performed by: EMERGENCY MEDICINE

## 2024-11-18 PROCEDURE — 99285 EMERGENCY DEPT VISIT HI MDM: CPT

## 2024-11-18 PROCEDURE — 87811 SARS-COV-2 COVID19 W/OPTIC: CPT | Performed by: EMERGENCY MEDICINE

## 2024-11-18 PROCEDURE — 94640 AIRWAY INHALATION TREATMENT: CPT

## 2024-11-18 PROCEDURE — 93005 ELECTROCARDIOGRAM TRACING: CPT

## 2024-11-18 PROCEDURE — 99285 EMERGENCY DEPT VISIT HI MDM: CPT | Performed by: EMERGENCY MEDICINE

## 2024-11-18 PROCEDURE — 71045 X-RAY EXAM CHEST 1 VIEW: CPT

## 2024-11-18 RX ORDER — METHYLPREDNISOLONE 4 MG/1
TABLET ORAL
Qty: 21 TABLET | Refills: 0 | Status: SHIPPED | OUTPATIENT
Start: 2024-11-18

## 2024-11-18 RX ORDER — IPRATROPIUM BROMIDE AND ALBUTEROL SULFATE .5; 3 MG/3ML; MG/3ML
1 SOLUTION RESPIRATORY (INHALATION) ONCE
Status: COMPLETED | OUTPATIENT
Start: 2024-11-18 | End: 2024-11-19

## 2024-11-18 RX ORDER — ALBUTEROL SULFATE 0.83 MG/ML
5 SOLUTION RESPIRATORY (INHALATION) ONCE
Status: COMPLETED | OUTPATIENT
Start: 2024-11-18 | End: 2024-11-18

## 2024-11-18 RX ORDER — ALBUTEROL SULFATE 90 UG/1
1-2 INHALANT RESPIRATORY (INHALATION) EVERY 4 HOURS PRN
Qty: 18 G | Refills: 0 | Status: SHIPPED | OUTPATIENT
Start: 2024-11-18

## 2024-11-18 RX ORDER — METHYLPREDNISOLONE SOD SUCC 125 MG
1 VIAL (EA) INJECTION ONCE
Status: COMPLETED | OUTPATIENT
Start: 2024-11-18 | End: 2024-11-19

## 2024-11-18 RX ADMIN — ALBUTEROL SULFATE 5 MG: 2.5 SOLUTION RESPIRATORY (INHALATION) at 22:22

## 2024-11-18 RX ADMIN — IPRATROPIUM BROMIDE 0.5 MG: 0.5 SOLUTION RESPIRATORY (INHALATION) at 22:22

## 2024-11-19 VITALS
SYSTOLIC BLOOD PRESSURE: 154 MMHG | BODY MASS INDEX: 33.74 KG/M2 | HEART RATE: 87 BPM | TEMPERATURE: 98.3 F | OXYGEN SATURATION: 91 % | WEIGHT: 215 LBS | HEIGHT: 67 IN | DIASTOLIC BLOOD PRESSURE: 70 MMHG | RESPIRATION RATE: 18 BRPM

## 2024-11-19 LAB
ATRIAL RATE: 93 BPM
P AXIS: 50 DEGREES
PR INTERVAL: 160 MS
QRS AXIS: 55 DEGREES
QRSD INTERVAL: 76 MS
QT INTERVAL: 388 MS
QTC INTERVAL: 482 MS
T WAVE AXIS: 78 DEGREES
VENTRICULAR RATE: 93 BPM

## 2024-11-19 PROCEDURE — 93010 ELECTROCARDIOGRAM REPORT: CPT | Performed by: INTERNAL MEDICINE

## 2024-11-19 NOTE — ED PROVIDER NOTES
Time reflects when diagnosis was documented in both MDM as applicable and the Disposition within this note       Time User Action Codes Description Comment    11/18/2024 11:10 PM Brisa Brito [J44.1] COPD exacerbation (HCC)     11/18/2024 11:11 PM Brisa Brito [J20.9] Acute bronchitis with wheezing           ED Disposition       ED Disposition   Discharge    Condition   Stable    Date/Time   Mon Nov 18, 2024 11:15 PM    Comment   Albert Jensen discharge to home/self care.                   Assessment & Plan       Medical Decision Making  64 year old male presents for evaluation of COPD exacerbation.  Patient has a nontender right shoulder mass, possibly lipoma vs cyst, with no overlying skin changes that has been present for years.  No indication for emergent intervention.  Oxygen saturation 92% on room air after nebulizer treatment.  Medrol dose pack and albuterol inhaler prescribed.  Pulmonology and internal medicine follow up.      Amount and/or Complexity of Data Reviewed  Labs: ordered.  Radiology: ordered and independent interpretation performed.    Risk  Prescription drug management.             Medications   albuterol inhalation solution 5 mg (5 mg Nebulization Given 11/18/24 2222)   ipratropium (ATROVENT) 0.02 % inhalation solution 0.5 mg (0.5 mg Nebulization Given 11/18/24 2222)   methylPREDNISolone sodium succinate (FOR EMS ONLY) (Solu-MEDROL) 125 MG injection 125 mg (0 mg Does not apply Given to EMS 11/19/24 0011)   ipratropium-albuterol (FOR EMS ONLY) (DUO-NEB) 0.5-2.5 mg/3 mL inhalation solution 3 mL (0 mL Does not apply Given to EMS 11/19/24 0011)       ED Risk Strat Scores                                               History of Present Illness       Chief Complaint   Patient presents with    Shortness of Breath     Pt via EMS from home with c/o SOB x 4 days. EMS found pt's O2 88-89% on RA. Pt received duo neb and 125mg of Solumedrol from EMS. Pt stated he's currently been getting  worked up for chronic bronchitis.        Past Medical History:   Diagnosis Date    Deafness in right ear     Hypertension       Past Surgical History:   Procedure Laterality Date    SPLENECTOMY, TOTAL        History reviewed. No pertinent family history.   Social History     Tobacco Use    Smoking status: Former     Current packs/day: 0.00     Types: Cigarettes     Quit date: 1987     Years since quittin.1    Smokeless tobacco: Never   Vaping Use    Vaping status: Never Used   Substance Use Topics    Alcohol use: Not Currently    Drug use: Not Currently      E-Cigarette/Vaping    E-Cigarette Use Never User       E-Cigarette/Vaping Substances    Nicotine No     THC No     CBD No     Flavoring No     Other No     Unknown No       I have reviewed and agree with the history as documented.     64 year old male presents for evaluation of shortness of breath and cough for 4 days.  Patient has history of COPD and feels that he needs another medrol dose pack.  He has not been using his albuterol inhaler as he feels it makes him cough more.  Patient denies fevers or chills.  He states he is supposed to have surgery to have a mass removed from his right shoulder and would like to be admitted.  Patient reported to have an oxygen saturation of 88% prior to arrival.  He was given 125 mg solumedrol and duo neb by EMS in transit with significant improvement.      Shortness of Breath      Review of Systems   Respiratory:  Positive for shortness of breath.            Objective       ED Triage Vitals [24]   Temperature Pulse Blood Pressure Respirations SpO2 Patient Position - Orthostatic VS   98.3 °F (36.8 °C) 87 (!) 174/86 20 96 % --      Temp Source Heart Rate Source BP Location FiO2 (%) Pain Score    Oral Monitor Left arm -- No Pain      Vitals      Date and Time Temp Pulse SpO2 Resp BP Pain Score FACES Pain Rating User   24 2330 -- 87 91 % 18 154/70 -- -- SV   24 2230 -- 80 98 % 21 168/76 -- -- AD    11/18/24 2216 98.3 °F (36.8 °C) 87 96 % 20 174/86 No Pain -- AD            Physical Exam  Vitals and nursing note reviewed.   HENT:      Head: Normocephalic and atraumatic.   Cardiovascular:      Rate and Rhythm: Normal rate and regular rhythm.      Pulses: Normal pulses.      Heart sounds: Normal heart sounds.   Pulmonary:      Effort: Pulmonary effort is normal.      Breath sounds: Wheezing present.   Abdominal:      General: There is no distension.      Palpations: Abdomen is soft.      Tenderness: There is no abdominal tenderness.   Neurological:      Mental Status: He is alert.         Results Reviewed       Procedure Component Value Units Date/Time    FLU/COVID Rapid Antigen (30 min. TAT) - Preferred screening test in ED [[id number]]  (Normal) Collected: 11/18/24 2222    Lab Status: Final result Specimen: Nares from Nose Updated: 11/18/24 2300     SARS COV Rapid Antigen Negative     Influenza A Rapid Antigen Negative     Influenza B Rapid Antigen Negative    Narrative:      This test has been performed using the [employer] Di 2 FLU+SARS Antigen test under the Emergency Use Authorization (EUA). This test has been validated by the  and verified by the performing laboratory. The Di uses lateral flow immunofluorescent sandwich assay to detect SARS-COV, Influenza A and Influenza B Antigen.     The [employer] Di 2 SARS Antigen test does not differentiate between SARS-CoV and SARS-CoV-2.     Negative results are presumptive and may be confirmed with a molecular assay, if necessary, for patient management. Negative results do not rule out SARS-CoV-2 or influenza infection and should not be used as the sole basis for treatment or patient management decisions. A negative test result may occur if the level of antigen in a sample is below the limit of detection of this test.     Positive results are indicative of the presence of viral antigens, but do not rule out bacterial infection or co-infection with  other viruses.     All test results should be used as an adjunct to clinical observations and other information available to the provider.    FOR PEDIATRIC PATIENTS - copy/paste COVID Guidelines URL to browser: [email or web address]            XR chest 1 view portable   ED Interpretation by Brisa Brito MD (11/18 2232)   No acute pulmonary pathology          ECG 12 Lead Documentation Only    Date/Time: 11/18/2024 10:20 PM    Performed by: Brisa Brito MD  Authorized by: Brisa Brito MD    Indications / Diagnosis:  Shortness of breath  ECG reviewed by me, the ED Provider: yes    Patient location:  ED  Previous ECG:     Previous ECG:  Compared to current    Comparison ECG info:  9/12/24 normal ekg    Similarity:  No change  Interpretation:     Interpretation: normal    Rate:     ECG rate:  93    ECG rate assessment: normal    Rhythm:     Rhythm: sinus rhythm    Ectopy:     Ectopy: none    QRS:     QRS axis:  Normal    QRS intervals:  Normal  Conduction:     Conduction: normal    ST segments:     ST segments:  Normal  T waves:     T waves: normal        ED Medication and Procedure Management   Prior to Admission Medications   Prescriptions Last Dose Informant Patient Reported? Taking?   Fluticasone Furoate-Vilanterol 100-25 mcg/actuation inhaler   No No   Sig: Inhale 1 puff daily Rinse mouth after use.   Patient not taking: Reported on 7/15/2024   albuterol (Proventil HFA) 90 mcg/act inhaler   No No   Sig: Inhale 1-2 puffs every 6 (six) hours as needed for wheezing or shortness of breath   albuterol (Proventil HFA) 90 mcg/act inhaler   No Yes   Sig: Inhale 1-2 puffs every 4 (four) hours as needed for wheezing or shortness of breath   benzonatate (TESSALON PERLES) 100 mg capsule   No No   Sig: Take 1 capsule (100 mg total) by mouth every 8 (eight) hours   Patient not taking: Reported on 7/15/2024   guaiFENesin (MUCINEX) 600 mg 12  hr tablet   No No   Sig: Take 1 tablet (600 mg total) by mouth 2 (two) times a day   Patient not taking: Reported on 7/15/2024   methylPREDNISolone 4 MG tablet therapy pack   No No   Sig: Use as directed on package   methylPREDNISolone 4 MG tablet therapy pack   No No   Sig: Use as directed on package   methylPREDNISolone 4 MG tablet therapy pack   No Yes   Sig: Use as directed on package   metroNIDAZOLE (METROCREAM) 0.75 % cream   No No   Sig: Apply 1 Application topically 2 (two) times a day      Facility-Administered Medications: None     Discharge Medication List as of 11/18/2024 11:17 PM        CONTINUE these medications which have CHANGED    Details   albuterol (Proventil HFA) 90 mcg/act inhaler Inhale 1-2 puffs every 4 (four) hours as needed for wheezing or shortness of breath, Starting Mon 11/18/2024, Normal      methylPREDNISolone 4 MG tablet therapy pack Use as directed on package, Normal           CONTINUE these medications which have NOT CHANGED    Details   benzonatate (TESSALON PERLES) 100 mg capsule Take 1 capsule (100 mg total) by mouth every 8 (eight) hours, Starting Sun 7/30/2023, Normal      Fluticasone Furoate-Vilanterol 100-25 mcg/actuation inhaler Inhale 1 puff daily Rinse mouth after use., Starting Fri 6/21/2024, Normal      guaiFENesin (MUCINEX) 600 mg 12 hr tablet Take 1 tablet (600 mg total) by mouth 2 (two) times a day, Starting Thu 6/20/2024, Normal      metroNIDAZOLE (METROCREAM) 0.75 % cream Apply 1 Application topically 2 (two) times a day, Starting u 6/20/2024, Normal             ED SEPSIS DOCUMENTATION   Time reflects when diagnosis was documented in both MDM as applicable and the Disposition within this note       Time User Action Codes Description Comment    11/18/2024 11:10 PM Brisa Brito [J44.1] COPD exacerbation (HCC)     11/18/2024 11:11 PM Brisa Brito [J20.9] Acute bronchitis with wheezing                  Brisa Brito MD  11/19/24 0022

## 2025-01-24 ENCOUNTER — HOSPITAL ENCOUNTER (EMERGENCY)
Facility: HOSPITAL | Age: 65
Discharge: HOME | End: 2025-01-24
Attending: EMERGENCY MEDICINE | Admitting: EMERGENCY MEDICINE

## 2025-01-24 VITALS
OXYGEN SATURATION: 95 % | WEIGHT: 205.47 LBS | HEART RATE: 78 BPM | RESPIRATION RATE: 18 BRPM | BODY MASS INDEX: 32.25 KG/M2 | TEMPERATURE: 99 F | DIASTOLIC BLOOD PRESSURE: 82 MMHG | HEIGHT: 67 IN | SYSTOLIC BLOOD PRESSURE: 175 MMHG

## 2025-01-24 DIAGNOSIS — J45.31 MILD PERSISTENT REACTIVE AIRWAY DISEASE WITH ACUTE EXACERBATION: ICD-10-CM

## 2025-01-24 DIAGNOSIS — J20.9 BRONCHITIS WITH BRONCHOSPASM: Primary | ICD-10-CM

## 2025-01-24 PROCEDURE — 99284 EMERGENCY DEPT VISIT MOD MDM: CPT | Performed by: EMERGENCY MEDICINE

## 2025-01-24 PROCEDURE — 99284 EMERGENCY DEPT VISIT MOD MDM: CPT

## 2025-01-24 NOTE — ED PROVIDER NOTES
Time reflects when diagnosis was documented in both MDM as applicable and the Disposition within this note       Time User Action Codes Description Comment    1/24/2025  7:18 PM Harvey Aguiar Add [J20.9] Bronchitis with bronchospasm     1/24/2025  7:19 PM Harvey Aguiar Add [J45.31] Mild persistent reactive airway disease with acute exacerbation           ED Disposition       ED Disposition   Discharge    Condition   Stable    Date/Time   Fri Jan 24, 2025  7:17 PM    Comment   Albert Jensen discharge to home/self care.                   Assessment & Plan       Medical Decision Making  64-year-old male with very frequent ED visits for bronchitis with bronchospasm.  He is inconsistent with his history tonight, being vague about when he was last treated by his PCP with antibiotics and steroids.  Notes on record shows his PCP was concerned that patient has been getting oral antibiotics and steroids too frequently and has refused inhaled steroids for his bronchospasm.  Also apparently noncompliant with instructions to follow-up with pulmonologist.  Patient states that he completed course of Zithromax and prednisone taper that was started about a month ago and that recently has been having more bronchospasm.  No fever.  Using his albuterol inhaler with relief.  Requesting prescription for antibiotics and steroids once again.  Also requesting admission for a subcutaneous mass of his left shoulder that is clinically consistent with lipoma or cyst.  He has had this examined by surgery but canceled elective surgery of same.    He did receive albuterol nebulizer on the way here. He is currently showing no signs of respiratory distress and has normal vital signs.  Lungs are clear with very few scattered end-expiratory wheezes.  Pulse ox over 95% while here.  After reviewing records and discussing this with the patient I instructed him to  the inhaled steroid that has been ordered by his PCP and start using it as  directed.  Instructed to use his albuterol inhaler only as directed.  Instructed him to make follow-up appointment with pulmonologist as per ambulatory referral ordered on prior ED visit.  Return instructions given.    Amount and/or Complexity of Data Reviewed  External Data Reviewed: labs and notes.             Medications - No data to display    ED Risk Strat Scores                          SBIRT 22yo+      Flowsheet Row Most Recent Value   Initial Alcohol Screen: US AUDIT-C     1. How often do you have a drink containing alcohol? 0 Filed at: 2025   2. How many drinks containing alcohol do you have on a typical day you are drinking?  0 Filed at: 2025   3a. Male UNDER 65: How often do you have five or more drinks on one occasion? 0 Filed at: 2025   Audit-C Score 0 Filed at: 2025   ANJELICA: How many times in the past year have you...    Used an illegal drug or used a prescription medication for non-medical reasons? Never Filed at: 2025                            History of Present Illness       Chief Complaint   Patient presents with    Shortness of Breath     Pt presents to ED via EMS from home reports having increased shortness of breath for the last week. States he recently completed a 5 day steroid with no relief. Denies fever N,V, headaches.        Past Medical History:   Diagnosis Date    Deafness in right ear     Hypertension       Past Surgical History:   Procedure Laterality Date    SPLENECTOMY, TOTAL        No family history on file.   Social History     Tobacco Use    Smoking status: Former     Current packs/day: 0.00     Types: Cigarettes     Quit date: 1987     Years since quittin.3    Smokeless tobacco: Never   Vaping Use    Vaping status: Never Used   Substance Use Topics    Alcohol use: Not Currently    Drug use: Not Currently      E-Cigarette/Vaping    E-Cigarette Use Never User       E-Cigarette/Vaping Substances    Nicotine No     THC  No     CBD No     Flavoring No     Other No     Unknown No       I have reviewed and agree with the history as documented.     64-year-old male with very frequent ED visits for bronchitis with bronchospasm.  Sounds he is noncompliant with his doctors and with his medication.  He is a poor historian.  He is always asking for oral steroids and antibiotics for this recurrent problem.  Reviewing 's notes they feel that he has been prescribed antibiotic and prednisone tapers much too frequently and prescribe for him inhaled steroid combination.  The patient has been refusing to take this feeling it might affect future fertility.  States his last Zithromax and prednisone use was about a month ago but he also states it was about 10 days ago.  He says over the last 10 days chest tightness this is worse and he has been using his albuterol inhaler.  He presents here for more steroids.  He received albuterol treatment on the way here.  Pulse ox on room air has been over 94% since being in the department.  Vital signs are stable.  Lungs have just a few.  Moving air well.  Speaks in full sentences without increased work of breathing.  No cough while here.        Review of Systems   Constitutional:  Negative for fever.   HENT:  Positive for postnasal drip. Negative for congestion.    Respiratory:  Positive for cough, shortness of breath and wheezing.    Cardiovascular:  Negative for chest pain, palpitations and leg swelling.   Gastrointestinal:  Negative for abdominal pain and blood in stool.   Neurological:  Negative for syncope and light-headedness.           Objective       ED Triage Vitals [01/24/25 1740]   Temperature Pulse Blood Pressure Respirations SpO2 Patient Position - Orthostatic VS   99 °F (37.2 °C) 89 156/84 20 95 % --      Temp Source Heart Rate Source BP Location FiO2 (%) Pain Score    Oral Monitor -- -- No Pain      Vitals      Date and Time Temp Pulse SpO2 Resp BP Pain Score FACES Pain Rating User   01/24/25  1900 -- 78 95 % 18 175/82 -- -- COLLEEN   01/24/25 1800 -- 85 94 % 20 153/74 -- -- COLLEEN   01/24/25 1740 99 °F (37.2 °C) 89 95 % 20 156/84 No Pain -- TH            Physical Exam  Vitals and nursing note reviewed.   Constitutional:       General: He is not in acute distress.     Appearance: He is well-developed and normal weight. He is not ill-appearing or diaphoretic.   HENT:      Head: Normocephalic and atraumatic.      Right Ear: External ear normal.      Left Ear: External ear normal.      Mouth/Throat:      Mouth: Mucous membranes are moist.      Pharynx: Oropharynx is clear.   Eyes:      General: No scleral icterus.     Extraocular Movements: Extraocular movements intact.      Conjunctiva/sclera: Conjunctivae normal.      Pupils: Pupils are equal, round, and reactive to light.   Neck:      Vascular: No JVD.   Cardiovascular:      Rate and Rhythm: Normal rate and regular rhythm.      Pulses: Normal pulses.   Pulmonary:      Effort: Pulmonary effort is normal. No accessory muscle usage or respiratory distress.      Breath sounds: No stridor. Examination of the right-middle field reveals wheezing. Examination of the left-middle field reveals wheezing. Examination of the right-lower field reveals wheezing. Examination of the left-lower field reveals wheezing. Wheezing present. No decreased breath sounds, rhonchi or rales.   Chest:      Chest wall: No tenderness.   Abdominal:      General: Bowel sounds are normal.      Palpations: Abdomen is soft.      Tenderness: There is no abdominal tenderness.   Musculoskeletal:         General: No tenderness. Normal range of motion.      Cervical back: Neck supple.      Right lower leg: No tenderness. No edema.      Left lower leg: No tenderness. No edema.   Skin:     General: Skin is warm and dry.      Capillary Refill: Capillary refill takes less than 2 seconds.      Findings: No rash.   Neurological:      General: No focal deficit present.      Mental Status: He is alert and  oriented to person, place, and time. Mental status is at baseline.      Cranial Nerves: No cranial nerve deficit.      Motor: No weakness.      Coordination: Coordination normal.      Deep Tendon Reflexes: Reflexes are normal and symmetric.   Psychiatric:         Mood and Affect: Mood is anxious.         Behavior: Behavior normal.         Results Reviewed       None            No orders to display       Procedures    ED Medication and Procedure Management   Prior to Admission Medications   Prescriptions Last Dose Informant Patient Reported? Taking?   Fluticasone Furoate-Vilanterol 100-25 mcg/actuation inhaler   No No   Sig: Inhale 1 puff daily Rinse mouth after use.   Patient not taking: Reported on 7/15/2024   albuterol (Proventil HFA) 90 mcg/act inhaler   No No   Sig: Inhale 1-2 puffs every 4 (four) hours as needed for wheezing or shortness of breath   benzonatate (TESSALON PERLES) 100 mg capsule   No No   Sig: Take 1 capsule (100 mg total) by mouth every 8 (eight) hours   Patient not taking: Reported on 7/15/2024   guaiFENesin (MUCINEX) 600 mg 12 hr tablet   No No   Sig: Take 1 tablet (600 mg total) by mouth 2 (two) times a day   Patient not taking: Reported on 7/15/2024   methylPREDNISolone 4 MG tablet therapy pack   No No   Sig: Use as directed on package   metroNIDAZOLE (METROCREAM) 0.75 % cream   No No   Sig: Apply 1 Application topically 2 (two) times a day      Facility-Administered Medications: None     Discharge Medication List as of 1/24/2025  7:24 PM        CONTINUE these medications which have NOT CHANGED    Details   albuterol (Proventil HFA) 90 mcg/act inhaler Inhale 1-2 puffs every 4 (four) hours as needed for wheezing or shortness of breath, Starting Mon 11/18/2024, Normal      benzonatate (TESSALON PERLES) 100 mg capsule Take 1 capsule (100 mg total) by mouth every 8 (eight) hours, Starting Sun 7/30/2023, Normal      Fluticasone Furoate-Vilanterol 100-25 mcg/actuation inhaler Inhale 1 puff daily  Rinse mouth after use., Starting Fri 6/21/2024, Normal      guaiFENesin (MUCINEX) 600 mg 12 hr tablet Take 1 tablet (600 mg total) by mouth 2 (two) times a day, Starting Thu 6/20/2024, Normal      methylPREDNISolone 4 MG tablet therapy pack Use as directed on package, Normal      metroNIDAZOLE (METROCREAM) 0.75 % cream Apply 1 Application topically 2 (two) times a day, Starting Thu 6/20/2024, Normal           No discharge procedures on file.  ED SEPSIS DOCUMENTATION   Time reflects when diagnosis was documented in both MDM as applicable and the Disposition within this note       Time User Action Codes Description Comment    1/24/2025  7:18 PM Harvey Aguiar [J20.9] Bronchitis with bronchospasm     1/24/2025  7:19 PM Harvey Aguiar [J45.31] Mild persistent reactive airway disease with acute exacerbation                  Harvey Aguiar DO  01/25/25 1730

## 2025-01-25 NOTE — DISCHARGE INSTRUCTIONS
Continue albuterol inhaler, 1 to 2 puffs every 4-6 hours only if needed for wheezing.  Also start using the other inhaled medicine your doctor prescribed for you, fluticasone furoate-vilanterol.  There may be prescription for you at the pharmacy if you did not already pick it up.    Drink a lot of fluids.    Contact your doctor for follow-up appointment.    Consider calling the [facility] agency on aging to help you with your housing problems. [phone].

## 2025-01-26 ENCOUNTER — HOSPITAL ENCOUNTER (EMERGENCY)
Facility: HOSPITAL | Age: 65
Discharge: HOME | End: 2025-01-27
Attending: EMERGENCY MEDICINE | Admitting: EMERGENCY MEDICINE

## 2025-01-26 DIAGNOSIS — J20.9 ACUTE BRONCHITIS: Primary | ICD-10-CM

## 2025-01-26 DIAGNOSIS — J20.9 ACUTE BRONCHITIS WITH WHEEZING: ICD-10-CM

## 2025-01-26 PROCEDURE — 99285 EMERGENCY DEPT VISIT HI MDM: CPT | Performed by: EMERGENCY MEDICINE

## 2025-01-26 PROCEDURE — 99285 EMERGENCY DEPT VISIT HI MDM: CPT

## 2025-01-27 ENCOUNTER — APPOINTMENT (EMERGENCY)
Dept: RADIOLOGY | Facility: HOSPITAL | Age: 65
End: 2025-01-27

## 2025-01-27 VITALS
DIASTOLIC BLOOD PRESSURE: 96 MMHG | RESPIRATION RATE: 22 BRPM | OXYGEN SATURATION: 96 % | TEMPERATURE: 98.7 F | SYSTOLIC BLOOD PRESSURE: 134 MMHG | HEART RATE: 75 BPM

## 2025-01-27 LAB
ALBUMIN SERPL BCG-MCNC: 4.5 G/DL (ref 3.5–5)
ALP SERPL-CCNC: 78 U/L (ref 34–104)
ALT SERPL W P-5'-P-CCNC: 17 U/L (ref 7–52)
ANION GAP SERPL CALCULATED.3IONS-SCNC: 13 MMOL/L (ref 4–13)
AST SERPL W P-5'-P-CCNC: 17 U/L (ref 13–39)
BASOPHILS # BLD AUTO: 0.08 THOUSANDS/ΜL (ref 0–0.1)
BASOPHILS NFR BLD AUTO: 1 % (ref 0–1)
BILIRUB SERPL-MCNC: 0.63 MG/DL (ref 0.2–1)
BUN SERPL-MCNC: 19 MG/DL (ref 5–25)
CALCIUM SERPL-MCNC: 9.1 MG/DL (ref 8.4–10.2)
CARDIAC TROPONIN I PNL SERPL HS: 6 NG/L (ref ?–50)
CHLORIDE SERPL-SCNC: 101 MMOL/L (ref 96–108)
CO2 SERPL-SCNC: 27 MMOL/L (ref 21–32)
CREAT SERPL-MCNC: 1 MG/DL (ref 0.6–1.3)
D DIMER PPP FEU-MCNC: <0.27 UG/ML FEU
EOSINOPHIL # BLD AUTO: 0.49 THOUSAND/ΜL (ref 0–0.61)
EOSINOPHIL NFR BLD AUTO: 4 % (ref 0–6)
ERYTHROCYTE [DISTWIDTH] IN BLOOD BY AUTOMATED COUNT: 13.5 % (ref 11.6–15.1)
FLUAV AG UPPER RESP QL IA.RAPID: NEGATIVE
FLUBV AG UPPER RESP QL IA.RAPID: NEGATIVE
GFR SERPL CREATININE-BSD FRML MDRD: 78 ML/MIN/1.73SQ M
GLUCOSE SERPL-MCNC: 201 MG/DL (ref 65–140)
HCT VFR BLD AUTO: 45 % (ref 36.5–49.3)
HGB BLD-MCNC: 14.8 G/DL (ref 12–17)
IMM GRANULOCYTES # BLD AUTO: 0.05 THOUSAND/UL (ref 0–0.2)
IMM GRANULOCYTES NFR BLD AUTO: 0 % (ref 0–2)
LYMPHOCYTES # BLD AUTO: 2.6 THOUSANDS/ΜL (ref 0.6–4.47)
LYMPHOCYTES NFR BLD AUTO: 20 % (ref 14–44)
MCH RBC QN AUTO: 31.3 PG (ref 26.8–34.3)
MCHC RBC AUTO-ENTMCNC: 32.9 G/DL (ref 31.4–37.4)
MCV RBC AUTO: 95 FL (ref 82–98)
MONOCYTES # BLD AUTO: 1.28 THOUSAND/ΜL (ref 0.17–1.22)
MONOCYTES NFR BLD AUTO: 10 % (ref 4–12)
NEUTROPHILS # BLD AUTO: 8.39 THOUSANDS/ΜL (ref 1.85–7.62)
NEUTS SEG NFR BLD AUTO: 65 % (ref 43–75)
NRBC BLD AUTO-RTO: 0 /100 WBCS
PLATELET # BLD AUTO: 354 THOUSANDS/UL (ref 149–390)
PMV BLD AUTO: 10.2 FL (ref 8.9–12.7)
POTASSIUM SERPL-SCNC: 3.6 MMOL/L (ref 3.5–5.3)
PROT SERPL-MCNC: 7.4 G/DL (ref 6.4–8.4)
RBC # BLD AUTO: 4.73 MILLION/UL (ref 3.88–5.62)
SARS-COV+SARS-COV-2 AG RESP QL IA.RAPID: NEGATIVE
SODIUM SERPL-SCNC: 141 MMOL/L (ref 135–147)
WBC # BLD AUTO: 12.89 THOUSAND/UL (ref 4.31–10.16)

## 2025-01-27 PROCEDURE — 93005 ELECTROCARDIOGRAM TRACING: CPT

## 2025-01-27 PROCEDURE — 87811 SARS-COV-2 COVID19 W/OPTIC: CPT | Performed by: EMERGENCY MEDICINE

## 2025-01-27 PROCEDURE — 85025 COMPLETE CBC W/AUTO DIFF WBC: CPT | Performed by: EMERGENCY MEDICINE

## 2025-01-27 PROCEDURE — 80053 COMPREHEN METABOLIC PANEL: CPT | Performed by: EMERGENCY MEDICINE

## 2025-01-27 PROCEDURE — 36415 COLL VENOUS BLD VENIPUNCTURE: CPT | Performed by: EMERGENCY MEDICINE

## 2025-01-27 PROCEDURE — 87804 INFLUENZA ASSAY W/OPTIC: CPT | Performed by: EMERGENCY MEDICINE

## 2025-01-27 PROCEDURE — 84484 ASSAY OF TROPONIN QUANT: CPT | Performed by: EMERGENCY MEDICINE

## 2025-01-27 PROCEDURE — 94644 CONT INHLJ TX 1ST HOUR: CPT

## 2025-01-27 PROCEDURE — 71045 X-RAY EXAM CHEST 1 VIEW: CPT

## 2025-01-27 PROCEDURE — 85379 FIBRIN DEGRADATION QUANT: CPT | Performed by: EMERGENCY MEDICINE

## 2025-01-27 PROCEDURE — 94760 N-INVAS EAR/PLS OXIMETRY 1: CPT

## 2025-01-27 RX ORDER — PREDNISONE 20 MG/1
40 TABLET ORAL DAILY
Qty: 8 TABLET | Refills: 0 | Status: SHIPPED | OUTPATIENT
Start: 2025-01-27 | End: 2025-01-28 | Stop reason: ALTCHOICE

## 2025-01-27 RX ORDER — ALBUTEROL SULFATE 5 MG/ML
10 SOLUTION RESPIRATORY (INHALATION) ONCE
Status: COMPLETED | OUTPATIENT
Start: 2025-01-27 | End: 2025-01-27

## 2025-01-27 RX ORDER — PREDNISONE 20 MG/1
40 TABLET ORAL ONCE
Status: DISCONTINUED | OUTPATIENT
Start: 2025-01-27 | End: 2025-01-27 | Stop reason: HOSPADM

## 2025-01-27 RX ORDER — SODIUM CHLORIDE FOR INHALATION 0.9 %
12 VIAL, NEBULIZER (ML) INHALATION ONCE
Status: COMPLETED | OUTPATIENT
Start: 2025-01-27 | End: 2025-01-27

## 2025-01-27 RX ORDER — ALBUTEROL SULFATE 90 UG/1
1-2 INHALANT RESPIRATORY (INHALATION) EVERY 4 HOURS PRN
Qty: 18 G | Refills: 0 | Status: SHIPPED | OUTPATIENT
Start: 2025-01-27

## 2025-01-27 RX ADMIN — IPRATROPIUM BROMIDE 1 MG: 0.5 SOLUTION RESPIRATORY (INHALATION) at 00:32

## 2025-01-27 RX ADMIN — ALBUTEROL SULFATE 10 MG: 2.5 SOLUTION RESPIRATORY (INHALATION) at 00:32

## 2025-01-27 RX ADMIN — ISODIUM CHLORIDE 12 ML: 0.03 SOLUTION RESPIRATORY (INHALATION) at 00:32

## 2025-01-27 NOTE — ED NOTES
"Pt stated to RN \"if you do not admit me I am going to keep coming back here and that doesn't look good for you.\" Pt states he does not think his home is livable for him and wants to stay at a hospital. Rn offered to order him an uber home so he did not  have to worry about getting a ride.      Aidee Goode  01/27/25 0402    "

## 2025-01-27 NOTE — DISCHARGE INSTRUCTIONS
You have been seen for shortness of breath. Please complete the course of prednisone as prescribed. Use the provided albuterol inhaler as needed. Return to the emergency department if you develop worsening trouble breathing, chest pain, wheezing or any other symptoms of concern. Please follow up with your PCP by calling the number provided.

## 2025-01-27 NOTE — ED PROVIDER NOTES
Time reflects when diagnosis was documented in both MDM as applicable and the Disposition within this note       Time User Action Codes Description Comment    1/27/2025  3:39 AM Po Goel [J20.9] Acute bronchitis     1/27/2025  3:40 AM Po Goel [J20.9] Acute bronchitis with wheezing           ED Disposition       ED Disposition   Discharge    Condition   Stable    Date/Time   Mon Jan 27, 2025  3:39 AM    Comment   Albert Jensen discharge to home/self care.                   Assessment & Plan       Medical Decision Making    64 y.o. male presenting for shortness of breath.  Will obtain labs to evaluate for leukocytosis, anemia, electrolyte abnormality or MINGO  Will order troponin, ddimer, EKG, CXR to evaluate for arrhythmia, ACS, PTX, pulmonary disease, widened mediastinum.  Ddx includes bronchitis, will treat symptomatically.    Reassessment: VSS, normal WOB and wheezing diminished on repeat exam.    Disposition: Given symptomatic improvement normal vitals no grounds for inpatient admission.    Discharge Plan: Rx for prednisone, continue albuterol inhaler PRN. RTED precautions emphasized. The patient was provided a written after visit summary with strict RTED precautions.     Followup: I have discussed with the patient plan to follow up with their PCP. Contact information provided in AVS.    Amount and/or Complexity of Data Reviewed  Labs: ordered.  Radiology: ordered and independent interpretation performed.    Risk  Prescription drug management.        ED Course as of 01/27/25 0734   Mon Jan 27, 2025   0018 Procedure Note: EKG  Date/Time: 01/27/25 12:18 AM   Interpreted by: Po Goel DO  Indications / Diagnosis: Dyspnea  ECG reviewed by me, the ED Provider: yes   The EKG demonstrates:  Rhythm: normal sinus rhythm 95 BPM  Intervals: Normal MI and QT intervals  Axis: Normal axis  QRS/Blocks: Normal QRS  ST Changes: No acute ST/T waves changes. No MONTY. No TWI.       Medications   albuterol  inhalation solution 10 mg (10 mg Nebulization Given 1/27/25 0032)   ipratropium (ATROVENT) 0.02 % inhalation solution 1 mg (1 mg Nebulization Given 1/27/25 0032)   sodium chloride 0.9 % inhalation solution 12 mL (12 mL Nebulization Given 1/27/25 0032)       ED Risk Strat Scores   HEART Risk Score      Flowsheet Row Most Recent Value   Heart Score Risk Calculator    History 0 Filed at: 01/27/2025 0341   ECG 0 Filed at: 01/27/2025 0341   Age 2 Filed at: 01/27/2025 0341   Risk Factors 2 Filed at: 01/27/2025 0341   Troponin 0 Filed at: 01/27/2025 0341   HEART Score 4 Filed at: 01/27/2025 0341          HEART Risk Score      Flowsheet Row Most Recent Value   Heart Score Risk Calculator    History 0 Filed at: 01/27/2025 0341   ECG 0 Filed at: 01/27/2025 0341   Age 2 Filed at: 01/27/2025 0341   Risk Factors 2 Filed at: 01/27/2025 0341   Troponin 0 Filed at: 01/27/2025 0341   HEART Score 4 Filed at: 01/27/2025 0341                            SBIRT 20yo+      Flowsheet Row Most Recent Value   Initial Alcohol Screen: US AUDIT-C     1. How often do you have a drink containing alcohol? 0 Filed at: 01/26/2025 2359   2. How many drinks containing alcohol do you have on a typical day you are drinking?  0 Filed at: 01/26/2025 2359   3a. Male UNDER 65: How often do you have five or more drinks on one occasion? 0 Filed at: 01/26/2025 2359   3b. FEMALE Any Age, or MALE 65+: How often do you have 4 or more drinks on one occassion? 0 Filed at: 01/26/2025 2359   Audit-C Score 0 Filed at: 01/26/2025 2359   ANJELICA: How many times in the past year have you...    Used an illegal drug or used a prescription medication for non-medical reasons? Never Filed at: 01/26/2025 2359                            History of Present Illness       Chief Complaint   Patient presents with    Shortness of Breath     Ems reports they were sent to pts residence several times this week, pt reports sob cough and abd pain, pt denies chest pain       Past Medical  History:   Diagnosis Date    Deafness in right ear     Hypertension       Past Surgical History:   Procedure Laterality Date    SPLENECTOMY, TOTAL        History reviewed. No pertinent family history.   Social History     Tobacco Use    Smoking status: Former     Current packs/day: 0.00     Types: Cigarettes     Quit date: 1987     Years since quittin.3    Smokeless tobacco: Never   Vaping Use    Vaping status: Never Used   Substance Use Topics    Alcohol use: Not Currently    Drug use: Not Currently      E-Cigarette/Vaping    E-Cigarette Use Never User       E-Cigarette/Vaping Substances    Nicotine No     THC No     CBD No     Flavoring No     Other No     Unknown No       I have reviewed and agree with the history as documented.     Albert Jensen is a 64 y.o. year old male with PMH of HTN, schizophrenia presenting to the [facility] ED for shortness of breath. Patient reporting shortness of breath for the past 10 days. He is reporting cough, nasal congestion and wheezing during this time. Patient feels symptoms are due to bronchitis. No fevers. No chest pain or leg swelling. Patient denies history of DVT/PE. No leg pain/swelling.  Patient has received a duoneb treatment en route from EMS. Patient is a former cigarette smoker.      History provided by:  Medical records and patient   used: No    Shortness of Breath  Associated symptoms: cough    Associated symptoms: no abdominal pain, no chest pain, no fever and no vomiting        Review of Systems   Constitutional:  Negative for chills and fever.   HENT:  Positive for congestion.    Respiratory:  Positive for cough and shortness of breath.    Cardiovascular:  Negative for chest pain and leg swelling.   Gastrointestinal:  Negative for abdominal pain, diarrhea, nausea and vomiting.   All other systems reviewed and are negative.          Objective       ED Triage Vitals   Temperature Pulse Blood Pressure Respirations SpO2 Patient Position -  Orthostatic VS   01/27/25 0123 01/26/25 2357 01/26/25 2357 01/26/25 2357 01/26/25 2357 01/26/25 2357   98.7 °F (37.1 °C) 100 134/96 20 92 % Sitting      Temp Source Heart Rate Source BP Location FiO2 (%) Pain Score    01/27/25 0123 01/26/25 2357 01/26/25 2357 -- --    Temporal Monitor Right arm        Vitals      Date and Time Temp Pulse SpO2 Resp BP Pain Score FACES Pain Rating User   01/27/25 0315 -- 75 96 % 22 134/96 -- -- MH   01/27/25 0123 98.7 °F (37.1 °C) -- -- -- -- -- -- MH   01/26/25 2357 -- 100 92 % 20 134/96 -- -- CK            Physical Exam  Vitals and nursing note reviewed.   Constitutional:       General: He is not in acute distress.     Appearance: Normal appearance. He is well-developed. He is not ill-appearing, toxic-appearing or diaphoretic.   HENT:      Head: Normocephalic and atraumatic.      Nose: Congestion present. No rhinorrhea.   Eyes:      General:         Right eye: No discharge.         Left eye: No discharge.   Cardiovascular:      Rate and Rhythm: Normal rate and regular rhythm.   Pulmonary:      Effort: Pulmonary effort is normal. No tachypnea, accessory muscle usage, respiratory distress or retractions.      Breath sounds: No stridor. Wheezing (diffuse, end expiratory) present. No rales.   Abdominal:      General: There is no distension.      Palpations: Abdomen is soft.      Tenderness: There is no abdominal tenderness. There is no guarding or rebound.   Musculoskeletal:      Cervical back: Normal range of motion. No rigidity.   Skin:     General: Skin is warm.      Capillary Refill: Capillary refill takes less than 2 seconds.   Neurological:      Mental Status: He is alert and oriented to person, place, and time.   Psychiatric:         Mood and Affect: Mood normal.         Behavior: Behavior normal.         Results Reviewed       Procedure Component Value Units Date/Time    D-dimer, quantitative [[id number]]  (Normal) Collected: 01/27/25 0017    Lab Status: Final result Specimen:  Blood from Arm, Right Updated: 01/27/25 0332     D-Dimer, Quant <0.27 ug/ml FEU     Narrative:      In the evaluation for possible pulmonary embolism, in the appropriate (Well's Score of 4 or less) patient, the age adjusted d-dimer cutoff for this patient can be calculated as:    Age x 0.01 (in ug/mL) for Age-adjusted D-dimer exclusion threshold for a patient over 50 years.    HS Troponin 0hr (reflex protocol) [[id number]]  (Normal) Collected: 01/27/25 0017    Lab Status: Final result Specimen: Blood from Arm, Right Updated: 01/27/25 0051     hs TnI 0hr 6 ng/L     FLU/COVID Rapid Antigen (30 min. TAT) - Preferred screening test in ED [[id number]]  (Normal) Collected: 01/27/25 0017    Lab Status: Final result Specimen: Nares from Nose Updated: 01/27/25 0047     SARS COV Rapid Antigen Negative     Influenza A Rapid Antigen Negative     Influenza B Rapid Antigen Negative    Narrative:      This test has been performed using the Picreelidel Di 2 FLU+SARS Antigen test under the Emergency Use Authorization (EUA). This test has been validated by the  and verified by the performing laboratory. The Di uses lateral flow immunofluorescent sandwich assay to detect SARS-COV, Influenza A and Influenza B Antigen.     The Quidel Di 2 SARS Antigen test does not differentiate between SARS-CoV and SARS-CoV-2.     Negative results are presumptive and may be confirmed with a molecular assay, if necessary, for patient management. Negative results do not rule out SARS-CoV-2 or influenza infection and should not be used as the sole basis for treatment or patient management decisions. A negative test result may occur if the level of antigen in a sample is below the limit of detection of this test.     Positive results are indicative of the presence of viral antigens, but do not rule out bacterial infection or co-infection with other viruses.     All test results should be used as an adjunct to clinical observations and other  information available to the provider.    FOR PEDIATRIC PATIENTS - copy/paste COVID Guidelines URL to browser: [email or web address]    Comprehensive metabolic panel [[id number]]  (Abnormal) Collected: 01/27/25 0017    Lab Status: Final result Specimen: Blood from Arm, Right Updated: 01/27/25 0046     Sodium 141 mmol/L      Potassium 3.6 mmol/L      Chloride 101 mmol/L      CO2 27 mmol/L      ANION GAP 13 mmol/L      BUN 19 mg/dL      Creatinine 1.00 mg/dL      Glucose 201 mg/dL      Calcium 9.1 mg/dL      AST 17 U/L      ALT 17 U/L      Alkaline Phosphatase 78 U/L      Total Protein 7.4 g/dL      Albumin 4.5 g/dL      Total Bilirubin 0.63 mg/dL      eGFR 78 ml/min/1.73sq m     Narrative:      National Kidney Disease Foundation guidelines for Chronic Kidney Disease (CKD):     Stage 1 with normal or high GFR (GFR > 90 mL/min/1.73 square meters)    Stage 2 Mild CKD (GFR = 60-89 mL/min/1.73 square meters)    Stage 3A Moderate CKD (GFR = 45-59 mL/min/1.73 square meters)    Stage 3B Moderate CKD (GFR = 30-44 mL/min/1.73 square meters)    Stage 4 Severe CKD (GFR = 15-29 mL/min/1.73 square meters)    Stage 5 End Stage CKD (GFR <15 mL/min/1.73 square meters)  Note: GFR calculation is accurate only with a steady state creatinine    CBC and differential [[id number]]  (Abnormal) Collected: 01/27/25 0017    Lab Status: Final result Specimen: Blood from Arm, Right Updated: 01/27/25 0029     WBC 12.89 Thousand/uL      RBC 4.73 Million/uL      Hemoglobin 14.8 g/dL      Hematocrit 45.0 %      MCV 95 fL      MCH 31.3 pg      MCHC 32.9 g/dL      RDW 13.5 %      MPV 10.2 fL      Platelets 354 Thousands/uL      nRBC 0 /100 WBCs      Segmented % 65 %      Immature Grans % 0 %      Lymphocytes % 20 %      Monocytes % 10 %      Eosinophils Relative 4 %      Basophils Relative 1 %      Absolute Neutrophils 8.39 Thousands/µL      Absolute Immature Grans 0.05 Thousand/uL      Absolute  Lymphocytes 2.60 Thousands/µL      Absolute Monocytes 1.28 Thousand/µL      Eosinophils Absolute 0.49 Thousand/µL      Basophils Absolute 0.08 Thousands/µL             XR chest portable   ED Interpretation by Po Goel DO (01/27 0113)   No focal infiltrate. No acute cardiopulmonary disease.          Procedures    ED Medication and Procedure Management   Prior to Admission Medications   Prescriptions Last Dose Informant Patient Reported? Taking?   Fluticasone Furoate-Vilanterol 100-25 mcg/actuation inhaler   No No   Sig: Inhale 1 puff daily Rinse mouth after use.   Patient not taking: Reported on 7/15/2024   albuterol (Proventil HFA) 90 mcg/act inhaler   No No   Sig: Inhale 1-2 puffs every 4 (four) hours as needed for wheezing or shortness of breath   albuterol (Proventil HFA) 90 mcg/act inhaler   No Yes   Sig: Inhale 1-2 puffs every 4 (four) hours as needed for wheezing or shortness of breath   benzonatate (TESSALON PERLES) 100 mg capsule   No No   Sig: Take 1 capsule (100 mg total) by mouth every 8 (eight) hours   Patient not taking: Reported on 7/15/2024   guaiFENesin (MUCINEX) 600 mg 12 hr tablet   No No   Sig: Take 1 tablet (600 mg total) by mouth 2 (two) times a day   Patient not taking: Reported on 7/15/2024   methylPREDNISolone 4 MG tablet therapy pack   No No   Sig: Use as directed on package   metroNIDAZOLE (METROCREAM) 0.75 % cream   No No   Sig: Apply 1 Application topically 2 (two) times a day      Facility-Administered Medications: None     Discharge Medication List as of 1/27/2025  3:45 AM        START taking these medications    Details   predniSONE 20 mg tablet Take 2 tablets (40 mg total) by mouth daily for 4 days, Starting Mon 1/27/2025, Until Fri 1/31/2025, Normal           CONTINUE these medications which have CHANGED    Details   albuterol (Proventil HFA) 90 mcg/act inhaler Inhale 1-2 puffs every 4 (four) hours as needed for wheezing or shortness of breath, Starting Mon 1/27/2025, Normal            CONTINUE these medications which have NOT CHANGED    Details   benzonatate (TESSALON PERLES) 100 mg capsule Take 1 capsule (100 mg total) by mouth every 8 (eight) hours, Starting Sun 7/30/2023, Normal      Fluticasone Furoate-Vilanterol 100-25 mcg/actuation inhaler Inhale 1 puff daily Rinse mouth after use., Starting Fri 6/21/2024, Normal      guaiFENesin (MUCINEX) 600 mg 12 hr tablet Take 1 tablet (600 mg total) by mouth 2 (two) times a day, Starting u 6/20/2024, Normal      methylPREDNISolone 4 MG tablet therapy pack Use as directed on package, Normal      metroNIDAZOLE (METROCREAM) 0.75 % cream Apply 1 Application topically 2 (two) times a day, Starting Thu 6/20/2024, Normal           No discharge procedures on file.  ED SEPSIS DOCUMENTATION   Time reflects when diagnosis was documented in both MDM as applicable and the Disposition within this note       Time User Action Codes Description Comment    1/27/2025  3:39 AM Po Goel [J20.9] Acute bronchitis     1/27/2025  3:40 AM Po Goel [J20.9] Acute bronchitis with wheezing                  Po Goel,   01/27/25 0734

## 2025-01-27 NOTE — ED NOTES
Ems repots pt was picked up earlier today by crew and sent [facility] pt requested to go to hospitals further away when the crew said no the pt began to act sicker, ems reports pt admitted to faking symptoms because he wants to be admitted, ems repots pt requested this hospital because he wants to be admitted.     Priyank Medina RN  01/27/25 0001

## 2025-01-28 ENCOUNTER — HOSPITAL ENCOUNTER (EMERGENCY)
Facility: HOSPITAL | Age: 65
Discharge: HOME | End: 2025-01-28
Attending: STUDENT IN AN ORGANIZED HEALTH CARE EDUCATION/TRAINING PROGRAM

## 2025-01-28 ENCOUNTER — APPOINTMENT (EMERGENCY)
Dept: RADIOLOGY | Facility: HOSPITAL | Age: 65
End: 2025-01-28

## 2025-01-28 VITALS
SYSTOLIC BLOOD PRESSURE: 181 MMHG | HEART RATE: 85 BPM | TEMPERATURE: 99.1 F | OXYGEN SATURATION: 95 % | DIASTOLIC BLOOD PRESSURE: 81 MMHG | RESPIRATION RATE: 20 BRPM

## 2025-01-28 DIAGNOSIS — E87.6 HYPOKALEMIA: ICD-10-CM

## 2025-01-28 DIAGNOSIS — J40 BRONCHITIS WITH WHEEZING: Primary | ICD-10-CM

## 2025-01-28 DIAGNOSIS — R03.0 ELEVATED BLOOD PRESSURE READING: ICD-10-CM

## 2025-01-28 DIAGNOSIS — J44.1 COPD EXACERBATION (HCC): ICD-10-CM

## 2025-01-28 LAB
2HR DELTA HS TROPONIN: 0 NG/L
ALBUMIN SERPL BCG-MCNC: 4.5 G/DL (ref 3.5–5)
ALP SERPL-CCNC: 80 U/L (ref 34–104)
ALT SERPL W P-5'-P-CCNC: 18 U/L (ref 7–52)
ANION GAP SERPL CALCULATED.3IONS-SCNC: 10 MMOL/L (ref 4–13)
AST SERPL W P-5'-P-CCNC: 21 U/L (ref 13–39)
ATRIAL RATE: 95 BPM
BASOPHILS # BLD AUTO: 0.07 THOUSANDS/ΜL (ref 0–0.1)
BASOPHILS NFR BLD AUTO: 1 % (ref 0–1)
BILIRUB SERPL-MCNC: 0.58 MG/DL (ref 0.2–1)
BUN SERPL-MCNC: 20 MG/DL (ref 5–25)
CALCIUM SERPL-MCNC: 9.2 MG/DL (ref 8.4–10.2)
CARDIAC TROPONIN I PNL SERPL HS: 8 NG/L (ref ?–50)
CARDIAC TROPONIN I PNL SERPL HS: 8 NG/L (ref ?–50)
CHLORIDE SERPL-SCNC: 100 MMOL/L (ref 96–108)
CO2 SERPL-SCNC: 27 MMOL/L (ref 21–32)
CREAT SERPL-MCNC: 1.02 MG/DL (ref 0.6–1.3)
EOSINOPHIL # BLD AUTO: 0.19 THOUSAND/ΜL (ref 0–0.61)
EOSINOPHIL NFR BLD AUTO: 2 % (ref 0–6)
ERYTHROCYTE [DISTWIDTH] IN BLOOD BY AUTOMATED COUNT: 13.5 % (ref 11.6–15.1)
FLUAV AG UPPER RESP QL IA.RAPID: NEGATIVE
FLUBV AG UPPER RESP QL IA.RAPID: NEGATIVE
GFR SERPL CREATININE-BSD FRML MDRD: 76 ML/MIN/1.73SQ M
GLUCOSE SERPL-MCNC: 139 MG/DL (ref 65–140)
HCT VFR BLD AUTO: 42.1 % (ref 36.5–49.3)
HGB BLD-MCNC: 14.3 G/DL (ref 12–17)
IMM GRANULOCYTES # BLD AUTO: 0.09 THOUSAND/UL (ref 0–0.2)
IMM GRANULOCYTES NFR BLD AUTO: 1 % (ref 0–2)
LYMPHOCYTES # BLD AUTO: 1.54 THOUSANDS/ΜL (ref 0.6–4.47)
LYMPHOCYTES NFR BLD AUTO: 12 % (ref 14–44)
MAGNESIUM SERPL-MCNC: 2.1 MG/DL (ref 1.9–2.7)
MCH RBC QN AUTO: 32 PG (ref 26.8–34.3)
MCHC RBC AUTO-ENTMCNC: 34 G/DL (ref 31.4–37.4)
MCV RBC AUTO: 94 FL (ref 82–98)
MONOCYTES # BLD AUTO: 1.36 THOUSAND/ΜL (ref 0.17–1.22)
MONOCYTES NFR BLD AUTO: 11 % (ref 4–12)
NEUTROPHILS # BLD AUTO: 9.15 THOUSANDS/ΜL (ref 1.85–7.62)
NEUTS SEG NFR BLD AUTO: 73 % (ref 43–75)
NRBC BLD AUTO-RTO: 0 /100 WBCS
P AXIS: 84 DEGREES
PLATELET # BLD AUTO: 357 THOUSANDS/UL (ref 149–390)
PMV BLD AUTO: 10 FL (ref 8.9–12.7)
POTASSIUM SERPL-SCNC: 3.3 MMOL/L (ref 3.5–5.3)
PR INTERVAL: 162 MS
PROT SERPL-MCNC: 7.2 G/DL (ref 6.4–8.4)
QRS AXIS: 76 DEGREES
QRSD INTERVAL: 74 MS
QT INTERVAL: 378 MS
QTC INTERVAL: 475 MS
RBC # BLD AUTO: 4.47 MILLION/UL (ref 3.88–5.62)
SARS-COV+SARS-COV-2 AG RESP QL IA.RAPID: NEGATIVE
SODIUM SERPL-SCNC: 137 MMOL/L (ref 135–147)
T WAVE AXIS: 69 DEGREES
VENTRICULAR RATE: 95 BPM
WBC # BLD AUTO: 12.4 THOUSAND/UL (ref 4.31–10.16)

## 2025-01-28 PROCEDURE — 87811 SARS-COV-2 COVID19 W/OPTIC: CPT | Performed by: STUDENT IN AN ORGANIZED HEALTH CARE EDUCATION/TRAINING PROGRAM

## 2025-01-28 PROCEDURE — 93010 ELECTROCARDIOGRAM REPORT: CPT | Performed by: INTERNAL MEDICINE

## 2025-01-28 PROCEDURE — 84484 ASSAY OF TROPONIN QUANT: CPT | Performed by: STUDENT IN AN ORGANIZED HEALTH CARE EDUCATION/TRAINING PROGRAM

## 2025-01-28 PROCEDURE — 93005 ELECTROCARDIOGRAM TRACING: CPT

## 2025-01-28 PROCEDURE — 99285 EMERGENCY DEPT VISIT HI MDM: CPT

## 2025-01-28 PROCEDURE — 83735 ASSAY OF MAGNESIUM: CPT | Performed by: STUDENT IN AN ORGANIZED HEALTH CARE EDUCATION/TRAINING PROGRAM

## 2025-01-28 PROCEDURE — 80053 COMPREHEN METABOLIC PANEL: CPT | Performed by: STUDENT IN AN ORGANIZED HEALTH CARE EDUCATION/TRAINING PROGRAM

## 2025-01-28 PROCEDURE — 36415 COLL VENOUS BLD VENIPUNCTURE: CPT | Performed by: STUDENT IN AN ORGANIZED HEALTH CARE EDUCATION/TRAINING PROGRAM

## 2025-01-28 PROCEDURE — 71046 X-RAY EXAM CHEST 2 VIEWS: CPT

## 2025-01-28 PROCEDURE — 85025 COMPLETE CBC W/AUTO DIFF WBC: CPT | Performed by: STUDENT IN AN ORGANIZED HEALTH CARE EDUCATION/TRAINING PROGRAM

## 2025-01-28 PROCEDURE — 99285 EMERGENCY DEPT VISIT HI MDM: CPT | Performed by: STUDENT IN AN ORGANIZED HEALTH CARE EDUCATION/TRAINING PROGRAM

## 2025-01-28 PROCEDURE — 94644 CONT INHLJ TX 1ST HOUR: CPT

## 2025-01-28 PROCEDURE — 87804 INFLUENZA ASSAY W/OPTIC: CPT | Performed by: STUDENT IN AN ORGANIZED HEALTH CARE EDUCATION/TRAINING PROGRAM

## 2025-01-28 RX ORDER — SODIUM CHLORIDE FOR INHALATION 0.9 %
12 VIAL, NEBULIZER (ML) INHALATION ONCE
Status: COMPLETED | OUTPATIENT
Start: 2025-01-28 | End: 2025-01-28

## 2025-01-28 RX ORDER — AZITHROMYCIN 250 MG/1
TABLET, FILM COATED ORAL
Qty: 6 TABLET | Refills: 0 | Status: SHIPPED | OUTPATIENT
Start: 2025-01-28 | End: 2025-02-01

## 2025-01-28 RX ORDER — POTASSIUM CHLORIDE 1500 MG/1
40 TABLET, EXTENDED RELEASE ORAL ONCE
Status: COMPLETED | OUTPATIENT
Start: 2025-01-28 | End: 2025-01-28

## 2025-01-28 RX ORDER — ALBUTEROL SULFATE 5 MG/ML
10 SOLUTION RESPIRATORY (INHALATION) ONCE
Status: COMPLETED | OUTPATIENT
Start: 2025-01-28 | End: 2025-01-28

## 2025-01-28 RX ORDER — METHYLPREDNISOLONE 4 MG/1
TABLET ORAL
Qty: 21 TABLET | Refills: 0 | Status: SHIPPED | OUTPATIENT
Start: 2025-01-28

## 2025-01-28 RX ADMIN — ALBUTEROL SULFATE 10 MG: 2.5 SOLUTION RESPIRATORY (INHALATION) at 16:49

## 2025-01-28 RX ADMIN — ISODIUM CHLORIDE 12 ML: 0.03 SOLUTION RESPIRATORY (INHALATION) at 16:49

## 2025-01-28 RX ADMIN — POTASSIUM CHLORIDE 40 MEQ: 1500 TABLET, EXTENDED RELEASE ORAL at 18:39

## 2025-01-28 RX ADMIN — IPRATROPIUM BROMIDE 1 MG: 0.5 SOLUTION RESPIRATORY (INHALATION) at 16:49

## 2025-01-28 NOTE — ED PROVIDER NOTES
"Time reflects when diagnosis was documented in both MDM as applicable and the Disposition within this note       Time User Action Codes Description Comment    1/28/2025  6:39 PM Maral Betancur Add [J40] Bronchitis with wheezing     1/28/2025  6:40 PM Maral Betancur Add [R03.0] Elevated blood pressure reading     1/28/2025  7:11 PM Gypsy, Maral Add [E87.6] Hypokalemia     1/28/2025  7:13 PM GypsyMan leggetta Add [J44.1] COPD exacerbation (HCC)           ED Disposition       ED Disposition   Discharge    Condition   Stable    Date/Time   Tue Jan 28, 2025  7:11 PM    Comment   Albert Jensen discharge to home/self care.                   Assessment & Plan       Medical Decision Making  63 yo male, pmhx of HTN, COPD, recently seen in the  ED 1/24 and 1/26, presenting to the emergency department via EMS with continued cough, shortness of breath, wheezing sxs that he was recently seen in the ED for on 1/25 and 1/26. Symptoms appear unchanged.   Obtained labs, including x2 troponin, EKG, CXR, viral panel, and given DuoNebs for wheezing.  Labs are overall unremarkable, unchanged from workup done on 1/26.  Chest x-ray without any evidence of pneumonia.  Troponins are negative.  EKG appears nonischemic and patient's viral panel was also negative.  Patient repeatedly stated that he \"needs to be admitted\".  I explained numerous times to the patient that he has been deemed safe for discharge based on today's workup and encouraged him to follow-up with his PCP, pulmonologist and general surgeon for the right shoulder cyst that has been present for a long time and unchanged from my review of prior documentation. Patient stated that he doesn't want to go home because he has a \"storage problem\". Pt would not elaborate further.     Patient states that he has not been on abx for COPD exacerbation in \"a while\" and feels he needs antibiotics and requested a Medrol Dosepak.  He requested 4 or 5 refills of each. On review of prior " "ED visits here, it does not appear that he has been prescribed azithromycin recently. I informed patient that I cannot write for refills for these medications and that he will need to f/u with outpatient providers. Encouraged patient to follow-up with his primary care physician and pulmonologist for his COPD/bronchitis management.    Pt provided Lyft for safe transport home and he was also provided [facility] outpatient social resources information printed from [facility] website.     Pt discharged with strict return precautions and outpatient provider follow-up. AVSS upon discharge.     Amount and/or Complexity of Data Reviewed  External Data Reviewed: labs, radiology and notes.  Labs: ordered. Decision-making details documented in ED Course.  Radiology: ordered and independent interpretation performed.  ECG/medicine tests: ordered and independent interpretation performed.    Risk  OTC drugs.  Prescription drug management.        ED Course as of 01/31/25 0114   Tue Jan 28, 2025   1645 WBC(!): 12.40  12.89 yesterday, on prednisone    1657 SARS COV Rapid Antigen: Negative   1657 Influenza A Rapid Antigen: Negative   1708 Potassium(!): 3.3   1709 hs TnI 0hr: 8  Yesterday 6   1744 MAGNESIUM: 2.1   1826 Patient reassessed, wheezing improved. Labs are overall unremarkable; noted mild hypokalemia (likely in setting of albuterol use), ordered PO K+.  I reviewed the results of patient's labs and x-ray with him at bedside.  He states that he \"needs to be admitted\".  When asked why he feels this way he says he needs to take care of the shoulder cyst he has and he has \"too much going on\".  Patient's shoulder cyst appears unchanged from at least October on review of the media tab images, it also appears that he did have an outpatient surgery scheduled in October but it was marked as canceled.  I reiterated that he cannot be admitted for a routine surgery and that he should follow-up with surgery as an outpatient.  He " proceeded to show me issues with varicose veins on his right lower leg and says he needs his veins look that is well.  Again, reiterated that these that issues can be addressed as an outpatient but do not require emergent admission at this time.       Medications   albuterol inhalation solution 10 mg (10 mg Nebulization Given 25)   ipratropium (ATROVENT) 0.02 % inhalation solution 1 mg (1 mg Nebulization Given 25)   sodium chloride 0.9 % inhalation solution 12 mL (12 mL Nebulization Given 25)   potassium chloride (Klor-Con M20) CR tablet 40 mEq (40 mEq Oral Given 25 183)       ED Risk Strat Scores   HEART Risk Score      Flowsheet Row Most Recent Value   Heart Score Risk Calculator    History 0 Filed at: 2025 1710   ECG 0 Filed at: 2025 1710   Age 2 Filed at: 2025 1710   Risk Factors 1 Filed at: 2025 1710   Troponin 0 Filed at: 2025 1710   HEART Score 3 Filed at: 2025 1710          HEART Risk Score      Flowsheet Row Most Recent Value   Heart Score Risk Calculator    History 0 Filed at: 2025 1710   ECG 0 Filed at: 2025 1710   Age 2 Filed at: 2025 1710   Risk Factors 1 Filed at: 2025 1710   Troponin 0 Filed at: 2025 1710   HEART Score 3 Filed at: 2025 1710            History of Present Illness       Chief Complaint   Patient presents with    Shortness of Breath     Pt coming from home with c/o SOB. Pt reports x2 days. Pt was given 3 breathing tx in route via EMS. Pt 3rd visit to ER for same       Past Medical History:   Diagnosis Date    Deafness in right ear     Hypertension       Past Surgical History:   Procedure Laterality Date    SPLENECTOMY, TOTAL        History reviewed. No pertinent family history.   Social History     Tobacco Use    Smoking status: Former     Current packs/day: 0.00     Types: Cigarettes     Quit date: 1987     Years since quittin.3    Smokeless tobacco: Never   Vaping Use     "Vaping status: Never Used   Substance Use Topics    Alcohol use: Not Currently    Drug use: Not Currently      E-Cigarette/Vaping    E-Cigarette Use Never User       E-Cigarette/Vaping Substances    Nicotine No     THC No     CBD No     Flavoring No     Other No     Unknown No       I have reviewed and agree with the history as documented.     HPI    65 yo male, pmhx of HTN, COPD, recently seen in the  ED 1/24 and 1/26, presenting to the emergency department via EMS with cough, shortness of breath, wheezing. Symptoms are the same as they were yesterday when he was seen in the ED. Patient states that he was diagnosed with bronchitis and is not getting better and he also states that he needs to be admitted and cannot go home. When asked why he needs to be admitted, he says that he needs a surgery on his shoulder cyst and has \"a lot going on\". He denies any known fevers. He states he also needs a z pack and medrol pack. Was discharged w/ RX for prednisone on 1/26 however he is not taking it.       Review of Systems        Objective       ED Triage Vitals [01/28/25 1431]   Temperature Pulse Blood Pressure Respirations SpO2 Patient Position - Orthostatic VS   99.1 °F (37.3 °C) 97 (!) 180/95 20 96 % Sitting      Temp Source Heart Rate Source BP Location FiO2 (%) Pain Score    Oral Monitor Left arm -- --      Vitals      Date and Time Temp Pulse SpO2 Resp BP Pain Score FACES Pain Rating User   01/28/25 1915 -- -- -- -- 181/81 -- --    01/28/25 1818 -- 85 95 % 20 181/100 -- --    01/28/25 1630 -- 87 96 % 18 181/97 -- --    01/28/25 1431 99.1 °F (37.3 °C) 97 96 % 20 180/95 -- -- CM            Physical Exam  Vitals and nursing note reviewed.   Constitutional:       General: He is not in acute distress.     Appearance: He is not ill-appearing, toxic-appearing or diaphoretic.      Comments: Awake, alert, unkempt   HENT:      Mouth/Throat:      Pharynx: Oropharynx is clear.   Eyes:      Pupils: Pupils are equal, round, " and reactive to light.   Cardiovascular:      Rate and Rhythm: Normal rate and regular rhythm.      Pulses: Normal pulses.   Pulmonary:      Effort: Pulmonary effort is normal.      Breath sounds: Decreased air movement present. Wheezing (diffuse end expiratory wheezes B/L lung fields) present.   Abdominal:      General: There is no distension.   Musculoskeletal:      Cervical back: Neck supple.      Right lower leg: No edema.      Left lower leg: No edema.      Comments: Right shoulder cyst/mass, no erythema, warmth or drainage. Nontender to palpation   Skin:     General: Skin is warm and dry.      Findings: No rash.   Neurological:      General: No focal deficit present.      Mental Status: He is alert and oriented to person, place, and time. Mental status is at baseline.   Psychiatric:         Mood and Affect: Mood normal.         Behavior: Behavior normal.         Results Reviewed       Procedure Component Value Units Date/Time    HS Troponin I 2hr [[id number]]  (Normal) Collected: 01/28/25 1818    Lab Status: Final result Specimen: Blood from Line Updated: 01/28/25 1854     hs TnI 2hr 8 ng/L      Delta 2hr hsTnI 0 ng/L     Magnesium [[id number]]  (Normal) Collected: 01/28/25 1632    Lab Status: Final result Specimen: Blood from Arm, Left Updated: 01/28/25 1722     Magnesium 2.1 mg/dL     HS Troponin 0hr (reflex protocol) [[id number]]  (Normal) Collected: 01/28/25 1632    Lab Status: Final result Specimen: Blood from Arm, Left Updated: 01/28/25 1705     hs TnI 0hr 8 ng/L     Comprehensive metabolic panel [[id number]]  (Abnormal) Collected: 01/28/25 1632    Lab Status: Final result Specimen: Blood from Arm, Left Updated: 01/28/25 1658     Sodium 137 mmol/L      Potassium 3.3 mmol/L      Chloride 100 mmol/L      CO2 27 mmol/L      ANION GAP 10 mmol/L      BUN 20 mg/dL      Creatinine 1.02 mg/dL      Glucose 139 mg/dL      Calcium 9.2 mg/dL      AST 21 U/L      ALT 18 U/L      Alkaline Phosphatase 80 U/L      Total  Protein 7.2 g/dL      Albumin 4.5 g/dL      Total Bilirubin 0.58 mg/dL      eGFR 76 ml/min/1.73sq m     Narrative:      National Kidney Disease Foundation guidelines for Chronic Kidney Disease (CKD):     Stage 1 with normal or high GFR (GFR > 90 mL/min/1.73 square meters)    Stage 2 Mild CKD (GFR = 60-89 mL/min/1.73 square meters)    Stage 3A Moderate CKD (GFR = 45-59 mL/min/1.73 square meters)    Stage 3B Moderate CKD (GFR = 30-44 mL/min/1.73 square meters)    Stage 4 Severe CKD (GFR = 15-29 mL/min/1.73 square meters)    Stage 5 End Stage CKD (GFR <15 mL/min/1.73 square meters)  Note: GFR calculation is accurate only with a steady state creatinine    FLU/COVID Rapid Antigen (30 min. TAT) - Preferred screening test in ED [[id number]]  (Normal) Collected: 01/28/25 1632    Lab Status: Final result Specimen: Nares from Nose Updated: 01/28/25 1655     SARS COV Rapid Antigen Negative     Influenza A Rapid Antigen Negative     Influenza B Rapid Antigen Negative    Narrative:      This test has been performed using the [employer] Di 2 FLU+SARS Antigen test under the Emergency Use Authorization (EUA). This test has been validated by the  and verified by the performing laboratory. The Di uses lateral flow immunofluorescent sandwich assay to detect SARS-COV, Influenza A and Influenza B Antigen.     The Quidel Di 2 SARS Antigen test does not differentiate between SARS-CoV and SARS-CoV-2.     Negative results are presumptive and may be confirmed with a molecular assay, if necessary, for patient management. Negative results do not rule out SARS-CoV-2 or influenza infection and should not be used as the sole basis for treatment or patient management decisions. A negative test result may occur if the level of antigen in a sample is below the limit of detection of this test.     Positive results are indicative of the presence of viral antigens, but do not rule out bacterial infection or co-infection with other  viruses.     All test results should be used as an adjunct to clinical observations and other information available to the provider.    FOR PEDIATRIC PATIENTS - copy/paste COVID Guidelines URL to browser: [email or web address]    CBC and differential [[id number]]  (Abnormal) Collected: 01/28/25 1632    Lab Status: Final result Specimen: Blood from Arm, Left Updated: 01/28/25 1639     WBC 12.40 Thousand/uL      RBC 4.47 Million/uL      Hemoglobin 14.3 g/dL      Hematocrit 42.1 %      MCV 94 fL      MCH 32.0 pg      MCHC 34.0 g/dL      RDW 13.5 %      MPV 10.0 fL      Platelets 357 Thousands/uL      nRBC 0 /100 WBCs      Segmented % 73 %      Immature Grans % 1 %      Lymphocytes % 12 %      Monocytes % 11 %      Eosinophils Relative 2 %      Basophils Relative 1 %      Absolute Neutrophils 9.15 Thousands/µL      Absolute Immature Grans 0.09 Thousand/uL      Absolute Lymphocytes 1.54 Thousands/µL      Absolute Monocytes 1.36 Thousand/µL      Eosinophils Absolute 0.19 Thousand/µL      Basophils Absolute 0.07 Thousands/µL             XR chest 2 views   ED Interpretation by Maral Betancur DO (01/28 1647)   No acute cardiopulmonary process      Final Interpretation by Luis Jara DO (01/28 1846)      No acute cardiopulmonary disease.            Workstation performed: [id number]             ECG 12 Lead Documentation Only    Date/Time: 1/28/2025 4:41 PM    Performed by: Maral Betancur DO  Authorized by: Maral Betancur DO    Indications / Diagnosis:  Shortness of breath  ECG reviewed by me, the ED Provider: yes    Patient location:  ED  Interpretation:     Interpretation: normal    Rate:     ECG rate:  82    ECG rate assessment: normal    Rhythm:     Rhythm: sinus rhythm    Ectopy:     Ectopy: none    QRS:     QRS axis:  Normal    QRS intervals:  Normal  Conduction:     Conduction: normal    ST segments:     ST segments:  Normal  T waves:     T  waves: normal    Comments:      No acute STD or MONTY c/f active ischemia  No STEMI      ED Medication and Procedure Management   Prior to Admission Medications   Prescriptions Last Dose Informant Patient Reported? Taking?   Fluticasone Furoate-Vilanterol 100-25 mcg/actuation inhaler   No No   Sig: Inhale 1 puff daily Rinse mouth after use.   Patient not taking: Reported on 7/15/2024   albuterol (Proventil HFA) 90 mcg/act inhaler   No No   Sig: Inhale 1-2 puffs every 4 (four) hours as needed for wheezing or shortness of breath   benzonatate (TESSALON PERLES) 100 mg capsule   No No   Sig: Take 1 capsule (100 mg total) by mouth every 8 (eight) hours   Patient not taking: Reported on 7/15/2024   guaiFENesin (MUCINEX) 600 mg 12 hr tablet   No No   Sig: Take 1 tablet (600 mg total) by mouth 2 (two) times a day   Patient not taking: Reported on 7/15/2024   methylPREDNISolone 4 MG tablet therapy pack   No No   Sig: Use as directed on package   methylPREDNISolone 4 MG tablet therapy pack   No Yes   Sig: Use as directed on package   metroNIDAZOLE (METROCREAM) 0.75 % cream   No No   Sig: Apply 1 Application topically 2 (two) times a day   predniSONE 20 mg tablet   No No   Sig: Take 2 tablets (40 mg total) by mouth daily for 4 days      Facility-Administered Medications: None     Discharge Medication List as of 1/28/2025  7:15 PM        START taking these medications    Details   azithromycin (ZITHROMAX) 250 mg tablet Take 2 tablets today then 1 tablet daily x 4 days, Normal           CONTINUE these medications which have CHANGED    Details   methylPREDNISolone 4 MG tablet therapy pack Use as directed on package, Normal           CONTINUE these medications which have NOT CHANGED    Details   albuterol (Proventil HFA) 90 mcg/act inhaler Inhale 1-2 puffs every 4 (four) hours as needed for wheezing or shortness of breath, Starting Mon 1/27/2025, Normal      benzonatate (TESSALON PERLES) 100 mg capsule Take 1 capsule (100 mg total)  by mouth every 8 (eight) hours, Starting Sun 7/30/2023, Normal      Fluticasone Furoate-Vilanterol 100-25 mcg/actuation inhaler Inhale 1 puff daily Rinse mouth after use., Starting Fri 6/21/2024, Normal      guaiFENesin (MUCINEX) 600 mg 12 hr tablet Take 1 tablet (600 mg total) by mouth 2 (two) times a day, Starting u 6/20/2024, Normal      metroNIDAZOLE (METROCREAM) 0.75 % cream Apply 1 Application topically 2 (two) times a day, Starting u 6/20/2024, Normal           STOP taking these medications       predniSONE 20 mg tablet Comments:   Reason for Stopping:             No discharge procedures on file.  ED SEPSIS DOCUMENTATION   Time reflects when diagnosis was documented in both MDM as applicable and the Disposition within this note       Time User Action Codes Description Comment    1/28/2025  6:39 PM Maral Betancur [J40] Bronchitis with wheezing     1/28/2025  6:40 PM Maral Betancur [R03.0] Elevated blood pressure reading     1/28/2025  7:11 PM Maral Betancur [E87.6] Hypokalemia     1/28/2025  7:13 PM Maral Betancur [J44.1] COPD exacerbation (HCC)                  Maral Betancur,   01/31/25 0114

## 2025-01-29 LAB
ATRIAL RATE: 82 BPM
P AXIS: 68 DEGREES
PR INTERVAL: 162 MS
QRS AXIS: 65 DEGREES
QRSD INTERVAL: 78 MS
QT INTERVAL: 406 MS
QTC INTERVAL: 475 MS
T WAVE AXIS: 64 DEGREES
VENTRICULAR RATE: 82 BPM

## 2025-01-29 PROCEDURE — 93010 ELECTROCARDIOGRAM REPORT: CPT | Performed by: INTERNAL MEDICINE

## 2025-01-29 NOTE — DISCHARGE INSTRUCTIONS
You were evaluated in the Emergency Department today for wheezing and shortness of breath. Your chest xray did not show evidence of a pneumonia.  You were given a nebulizer treatment while in the emergency department.  Your potassium level was also slightly low and you were given potassium supplementation.    Please schedule an appointment for follow up with your primary care physician within two days.  Please also follow-up with your outpatient general surgeon for evaluation of the cyst on your shoulder.      Return to the Emergency Department if you experience worsening cough, fever 100.4 ° F or greater, recurrent vomiting, chest pain, shortness of breath, or any other concerning symptoms.    Thank you for choosing us for your care

## 2025-01-29 NOTE — ED NOTES
Finding a therapist  1. Referral to [facility] system  2. Talk to insurance or look online to find in-network therapy options. 3.  Find out insurance out of network reimbursement. Look into options: [employer], there are lots of text and video/phone therapy options online as well. Lifestyle areas to focus on. ..   Exercise  Nutrition  Sleep  Mindfulness  Social connectedness      Possible medication class - Selective Serotonin Reuptake Inhibitors (celexa, lexapro)  CDC, WHO  [facility]  [employer] Based Stress Reduction (check out youtube)  - diaphragmatic breathing  - progressive muscle relaxation  - meditation  - guided imagery Lyft ride requested via roundtrip; pt to wait in waiting room until ride comes.     Kenzie Zelaya, ISELA  01/28/25 1943

## 2025-02-05 ENCOUNTER — TELEPHONE (OUTPATIENT)
Dept: OTHER | Facility: OTHER | Age: 65
End: 2025-02-05

## 2025-02-06 NOTE — TELEPHONE ENCOUNTER
PT calling in to leave a message for gen surgery regarding having someone speak with his PCP about procedures he needs to get done. Pt left two phone numbers to contact   [phone]      Please give him a call back to discuss

## 2025-02-07 ENCOUNTER — TELEPHONE (OUTPATIENT)
Dept: SURGERY | Facility: HOSPITAL | Age: 65
End: 2025-02-07

## 2025-02-07 NOTE — TELEPHONE ENCOUNTER
I reached out to his PCP which has never actually seen this patient. 2 previous appts patient cancelled. So he needs to be seen by a PCP for Medical Clearance prior to any surgical procedure and would have to be seen in our office prior to any surgical procedure. Patient was made aware of this in the past.

## 2025-03-18 ENCOUNTER — HOSPITAL ENCOUNTER (EMERGENCY)
Facility: HOSPITAL | Age: 65
Discharge: HOME | End: 2025-03-18
Attending: EMERGENCY MEDICINE | Admitting: EMERGENCY MEDICINE

## 2025-03-18 ENCOUNTER — APPOINTMENT (OUTPATIENT)
Dept: RADIOLOGY | Facility: HOSPITAL | Age: 65
End: 2025-03-18

## 2025-03-18 VITALS
OXYGEN SATURATION: 98 % | HEART RATE: 81 BPM | RESPIRATION RATE: 18 BRPM | DIASTOLIC BLOOD PRESSURE: 81 MMHG | WEIGHT: 215 LBS | TEMPERATURE: 98.4 F | SYSTOLIC BLOOD PRESSURE: 156 MMHG | BODY MASS INDEX: 33.67 KG/M2

## 2025-03-18 DIAGNOSIS — J44.1 COPD EXACERBATION (HCC): ICD-10-CM

## 2025-03-18 DIAGNOSIS — J20.9 ACUTE BRONCHITIS WITH WHEEZING: ICD-10-CM

## 2025-03-18 DIAGNOSIS — J44.1 COPD WITH ACUTE EXACERBATION (HCC): Primary | ICD-10-CM

## 2025-03-18 LAB
2HR DELTA HS TROPONIN: 5 NG/L
4HR DELTA HS TROPONIN: 6 NG/L
ALBUMIN SERPL BCG-MCNC: 4.7 G/DL (ref 3.5–5)
ALP SERPL-CCNC: 86 U/L (ref 34–104)
ALT SERPL W P-5'-P-CCNC: 15 U/L (ref 7–52)
ANION GAP SERPL CALCULATED.3IONS-SCNC: 9 MMOL/L (ref 4–13)
AST SERPL W P-5'-P-CCNC: 16 U/L (ref 13–39)
BASOPHILS # BLD AUTO: 0.13 THOUSANDS/ÂΜL (ref 0–0.1)
BASOPHILS NFR BLD AUTO: 1 % (ref 0–1)
BILIRUB SERPL-MCNC: 0.59 MG/DL (ref 0.2–1)
BUN SERPL-MCNC: 15 MG/DL (ref 5–25)
CALCIUM SERPL-MCNC: 9.6 MG/DL (ref 8.4–10.2)
CARDIAC TROPONIN I PNL SERPL HS: 11 NG/L (ref ?–50)
CARDIAC TROPONIN I PNL SERPL HS: 12 NG/L (ref ?–50)
CARDIAC TROPONIN I PNL SERPL HS: 6 NG/L (ref ?–50)
CHLORIDE SERPL-SCNC: 100 MMOL/L (ref 96–108)
CO2 SERPL-SCNC: 28 MMOL/L (ref 21–32)
CREAT SERPL-MCNC: 0.84 MG/DL (ref 0.6–1.3)
EOSINOPHIL # BLD AUTO: 0.37 THOUSAND/ÂΜL (ref 0–0.61)
EOSINOPHIL NFR BLD AUTO: 3 % (ref 0–6)
ERYTHROCYTE [DISTWIDTH] IN BLOOD BY AUTOMATED COUNT: 13.1 % (ref 11.6–15.1)
GFR SERPL CREATININE-BSD FRML MDRD: 91 ML/MIN/1.73SQ M
GLUCOSE SERPL-MCNC: 140 MG/DL (ref 65–140)
HCT VFR BLD AUTO: 47.1 % (ref 36.5–49.3)
HGB BLD-MCNC: 15.8 G/DL (ref 12–17)
IMM GRANULOCYTES # BLD AUTO: 0.07 THOUSAND/UL (ref 0–0.2)
IMM GRANULOCYTES NFR BLD AUTO: 1 % (ref 0–2)
LYMPHOCYTES # BLD AUTO: 1.8 THOUSANDS/ÂΜL (ref 0.6–4.47)
LYMPHOCYTES NFR BLD AUTO: 14 % (ref 14–44)
MAGNESIUM SERPL-MCNC: 2 MG/DL (ref 1.9–2.7)
MCH RBC QN AUTO: 31.9 PG (ref 26.8–34.3)
MCHC RBC AUTO-ENTMCNC: 33.5 G/DL (ref 31.4–37.4)
MCV RBC AUTO: 95 FL (ref 82–98)
MONOCYTES # BLD AUTO: 1.34 THOUSAND/ÂΜL (ref 0.17–1.22)
MONOCYTES NFR BLD AUTO: 10 % (ref 4–12)
NEUTROPHILS # BLD AUTO: 9.44 THOUSANDS/ÂΜL (ref 1.85–7.62)
NEUTS SEG NFR BLD AUTO: 71 % (ref 43–75)
NRBC BLD AUTO-RTO: 0 /100 WBCS
PLATELET # BLD AUTO: 302 THOUSANDS/UL (ref 149–390)
PMV BLD AUTO: 10.4 FL (ref 8.9–12.7)
POTASSIUM SERPL-SCNC: 3.7 MMOL/L (ref 3.5–5.3)
PROCALCITONIN SERPL-MCNC: <0.05 NG/ML
PROT SERPL-MCNC: 7.7 G/DL (ref 6.4–8.4)
RBC # BLD AUTO: 4.96 MILLION/UL (ref 3.88–5.62)
SODIUM SERPL-SCNC: 137 MMOL/L (ref 135–147)
WBC # BLD AUTO: 13.15 THOUSAND/UL (ref 4.31–10.16)

## 2025-03-18 PROCEDURE — 94640 AIRWAY INHALATION TREATMENT: CPT

## 2025-03-18 PROCEDURE — 36415 COLL VENOUS BLD VENIPUNCTURE: CPT

## 2025-03-18 PROCEDURE — 84484 ASSAY OF TROPONIN QUANT: CPT

## 2025-03-18 PROCEDURE — 93005 ELECTROCARDIOGRAM TRACING: CPT

## 2025-03-18 PROCEDURE — 84145 PROCALCITONIN (PCT): CPT | Performed by: EMERGENCY MEDICINE

## 2025-03-18 PROCEDURE — 83735 ASSAY OF MAGNESIUM: CPT | Performed by: EMERGENCY MEDICINE

## 2025-03-18 PROCEDURE — 96374 THER/PROPH/DIAG INJ IV PUSH: CPT

## 2025-03-18 PROCEDURE — 71046 X-RAY EXAM CHEST 2 VIEWS: CPT

## 2025-03-18 PROCEDURE — 99285 EMERGENCY DEPT VISIT HI MDM: CPT

## 2025-03-18 PROCEDURE — 99285 EMERGENCY DEPT VISIT HI MDM: CPT | Performed by: EMERGENCY MEDICINE

## 2025-03-18 PROCEDURE — 80053 COMPREHEN METABOLIC PANEL: CPT

## 2025-03-18 PROCEDURE — 85025 COMPLETE CBC W/AUTO DIFF WBC: CPT

## 2025-03-18 RX ORDER — METHYLPREDNISOLONE SODIUM SUCCINATE 125 MG/2ML
80 INJECTION, POWDER, LYOPHILIZED, FOR SOLUTION INTRAMUSCULAR; INTRAVENOUS ONCE
Status: COMPLETED | OUTPATIENT
Start: 2025-03-18 | End: 2025-03-18

## 2025-03-18 RX ORDER — METHYLPREDNISOLONE 4 MG/1
TABLET ORAL
Qty: 21 TABLET | Refills: 0 | Status: SHIPPED | OUTPATIENT
Start: 2025-03-18

## 2025-03-18 RX ORDER — ALBUTEROL SULFATE 5 MG/ML
5 SOLUTION RESPIRATORY (INHALATION) ONCE
Status: COMPLETED | OUTPATIENT
Start: 2025-03-18 | End: 2025-03-18

## 2025-03-18 RX ORDER — ALBUTEROL SULFATE 90 UG/1
1-2 INHALANT RESPIRATORY (INHALATION) EVERY 4 HOURS PRN
Qty: 18 G | Refills: 0 | Status: SHIPPED | OUTPATIENT
Start: 2025-03-18

## 2025-03-18 RX ADMIN — ALBUTEROL SULFATE 5 MG: 2.5 SOLUTION RESPIRATORY (INHALATION) at 21:55

## 2025-03-18 RX ADMIN — IPRATROPIUM BROMIDE 0.5 MG: 0.5 SOLUTION RESPIRATORY (INHALATION) at 21:55

## 2025-03-18 RX ADMIN — ALBUTEROL SULFATE 5 MG: 2.5 SOLUTION RESPIRATORY (INHALATION) at 19:02

## 2025-03-18 RX ADMIN — METHYLPREDNISOLONE SODIUM SUCCINATE 80 MG: 125 INJECTION, POWDER, FOR SOLUTION INTRAMUSCULAR; INTRAVENOUS at 19:37

## 2025-03-18 RX ADMIN — IPRATROPIUM BROMIDE 0.5 MG: 0.5 SOLUTION RESPIRATORY (INHALATION) at 19:02

## 2025-03-18 NOTE — ED PROVIDER NOTES
Time reflects when diagnosis was documented in both MDM as applicable and the Disposition within this note       Time User Action Codes Description Comment    3/18/2025 10:16 PM Brisa Brito [J44.1] COPD with acute exacerbation (HCC)     3/18/2025 11:01 PM Brisa Brito Add [J44.1] COPD exacerbation (HCC)     3/18/2025 11:01 PM Brisa Brito Add [J20.9] Acute bronchitis with wheezing           ED Disposition       ED Disposition   Discharge    Condition   Stable    Date/Time   Tue Mar 18, 2025 11:01 PM    Comment   Albert Jensen discharge to home/self care.                   Assessment & Plan       Medical Decision Making  64 year old male with history of COPD and poor outpatient follow up presents for evaluation of shortness of breath.  Nonspecific leukocytosis on CBC.  No infiltrate on CXR to suggest pneumonia.  Suspect COPD exacerbation.  Improvement after nebulizer treatments and solumedrol.  Pulmonology follow up encouraged.    Amount and/or Complexity of Data Reviewed  Labs: ordered. Decision-making details documented in ED Course.  Radiology: independent interpretation performed.    Risk  Prescription drug management.        ED Course as of 03/18/25 2303   Tue Mar 18, 2025   1941 Procalcitonin: <0.05   1941 Delta 2hr hsTnI: 5  Will need 4 hour trop       Medications   methylPREDNISolone sodium succinate (Solu-MEDROL) injection 80 mg (80 mg Intravenous Given 3/18/25 1937)   ipratropium (ATROVENT) 0.02 % inhalation solution 0.5 mg (0.5 mg Nebulization Given 3/18/25 1902)   albuterol inhalation solution 5 mg (5 mg Nebulization Given 3/18/25 1902)   ipratropium (ATROVENT) 0.02 % inhalation solution 0.5 mg (0.5 mg Nebulization Given 3/18/25 2155)   albuterol inhalation solution 5 mg (5 mg Nebulization Given 3/18/25 2155)       ED Risk Strat Scores                                                History of Present Illness       Chief Complaint   Patient presents with    Shortness of Breath      Comes to ed c/o sob for a long time. Denies chest pain.        Past Medical History:   Diagnosis Date    Deafness in right ear     Hypertension       Past Surgical History:   Procedure Laterality Date    SPLENECTOMY, TOTAL        History reviewed. No pertinent family history.   Social History     Tobacco Use    Smoking status: Former     Current packs/day: 0.00     Types: Cigarettes     Quit date: 1987     Years since quittin.5    Smokeless tobacco: Never   Vaping Use    Vaping status: Never Used   Substance Use Topics    Alcohol use: Not Currently    Drug use: Not Currently      E-Cigarette/Vaping    E-Cigarette Use Never User       E-Cigarette/Vaping Substances    Nicotine No     THC No     CBD No     Flavoring No     Other No     Unknown No       I have reviewed and agree with the history as documented.     64 year old male with history of COPD presents for evaluation of shortness of breath.  Patient states he used his albuterol inhaler early this morning without improvement.  Patient states he has trouble getting his medications and has not been able to see pulmonology.      Shortness of Breath      Review of Systems   Respiratory:  Positive for shortness of breath.            Objective       ED Triage Vitals [25 1653]   Temperature Pulse Blood Pressure Respirations SpO2 Patient Position - Orthostatic VS   98.3 °F (36.8 °C) 86 (!) 191/94 16 93 % Sitting      Temp Source Heart Rate Source BP Location FiO2 (%) Pain Score    Oral Monitor Right arm -- No Pain      Vitals      Date and Time Temp Pulse SpO2 Resp BP Pain Score FACES Pain Rating User   25 2200 -- 87 95 % 18 158/72 -- -- COLLEEN   25 2100 -- 81 95 % 18 167/81 -- -- COLLEEN   25 1949 98.4 °F (36.9 °C) 85 94 % -- 161/78 -- -- BETO   25 1653 98.3 °F (36.8 °C) 86 93 % 16 191/94 No Pain -- BY            Physical Exam  Vitals and nursing note reviewed.   HENT:      Head: Normocephalic and atraumatic.   Cardiovascular:      Rate and  Rhythm: Normal rate and regular rhythm.      Pulses: Normal pulses.      Heart sounds: Normal heart sounds.   Pulmonary:      Effort: Pulmonary effort is normal.      Breath sounds: Wheezing and rhonchi present.   Abdominal:      General: There is no distension.      Palpations: Abdomen is soft.      Tenderness: There is no abdominal tenderness.   Skin:     General: Skin is warm and dry.   Neurological:      Mental Status: He is alert.         Results Reviewed       Procedure Component Value Units Date/Time    HS Troponin I 4hr [[id number]]  (Normal) Collected: 03/18/25 2053    Lab Status: Final result Specimen: Blood from Arm, Right Updated: 03/18/25 2119     hs TnI 4hr 12 ng/L      Delta 4hr hsTnI 6 ng/L     Procalcitonin [[id number]]  (Normal) Collected: 03/18/25 1858    Lab Status: Final result Specimen: Blood from Arm, Right Updated: 03/18/25 1934     Procalcitonin <0.05 ng/ml     HS Troponin I 2hr [[id number]]  (Normal) Collected: 03/18/25 1859    Lab Status: Final result Specimen: Blood from Arm, Right Updated: 03/18/25 1930     hs TnI 2hr 11 ng/L      Delta 2hr hsTnI 5 ng/L     Magnesium [[id number]]  (Normal) Collected: 03/18/25 1858    Lab Status: Final result Specimen: Blood from Arm, Right Updated: 03/18/25 1925     Magnesium 2.0 mg/dL     HS Troponin 0hr (reflex protocol) [[id number]]  (Normal) Collected: 03/18/25 1659    Lab Status: Final result Specimen: Blood from Arm, Right Updated: 03/18/25 1727     hs TnI 0hr 6 ng/L     Comprehensive metabolic panel [[id number]] Collected: 03/18/25 1659    Lab Status: Final result Specimen: Blood from Arm, Right Updated: 03/18/25 1723     Sodium 137 mmol/L      Potassium 3.7 mmol/L      Chloride 100 mmol/L      CO2 28 mmol/L      ANION GAP 9 mmol/L      BUN 15 mg/dL      Creatinine 0.84 mg/dL      Glucose 140 mg/dL      Calcium 9.6 mg/dL      AST 16 U/L      ALT 15 U/L      Alkaline Phosphatase 86 U/L      Total Protein 7.7 g/dL      Albumin 4.7 g/dL      Total  Bilirubin 0.59 mg/dL      eGFR 91 ml/min/1.73sq m     Narrative:      National Kidney Disease Foundation guidelines for Chronic Kidney Disease (CKD):     Stage 1 with normal or high GFR (GFR > 90 mL/min/1.73 square meters)    Stage 2 Mild CKD (GFR = 60-89 mL/min/1.73 square meters)    Stage 3A Moderate CKD (GFR = 45-59 mL/min/1.73 square meters)    Stage 3B Moderate CKD (GFR = 30-44 mL/min/1.73 square meters)    Stage 4 Severe CKD (GFR = 15-29 mL/min/1.73 square meters)    Stage 5 End Stage CKD (GFR <15 mL/min/1.73 square meters)  Note: GFR calculation is accurate only with a steady state creatinine    CBC and differential [[id number]]  (Abnormal) Collected: 03/18/25 1659    Lab Status: Final result Specimen: Blood from Arm, Right Updated: 03/18/25 1711     WBC 13.15 Thousand/uL      RBC 4.96 Million/uL      Hemoglobin 15.8 g/dL      Hematocrit 47.1 %      MCV 95 fL      MCH 31.9 pg      MCHC 33.5 g/dL      RDW 13.1 %      MPV 10.4 fL      Platelets 302 Thousands/uL      nRBC 0 /100 WBCs      Segmented % 71 %      Immature Grans % 1 %      Lymphocytes % 14 %      Monocytes % 10 %      Eosinophils Relative 3 %      Basophils Relative 1 %      Absolute Neutrophils 9.44 Thousands/µL      Absolute Immature Grans 0.07 Thousand/uL      Absolute Lymphocytes 1.80 Thousands/µL      Absolute Monocytes 1.34 Thousand/µL      Eosinophils Absolute 0.37 Thousand/µL      Basophils Absolute 0.13 Thousands/µL             XR chest 2 views   ED Interpretation by Brisa Brito MD (03/18 1807)   No acute pulmonary pathology          ECG 12 Lead Documentation Only    Date/Time: 3/18/2025 6:44 PM    Performed by: Brisa Brito MD  Authorized by: Brisa Brito MD    Indications / Diagnosis:  Shortness of breath  ECG reviewed by me, the ED Provider: yes    Patient location:  ED  Previous ECG:     Previous ECG:  Unavailable  Interpretation:     Interpretation: non-specific    Rate:     ECG rate:  86    ECG  rate assessment: normal    Rhythm:     Rhythm: sinus rhythm    Ectopy:     Ectopy: none    QRS:     QRS axis:  Normal    QRS intervals:  Normal  Conduction:     Conduction: normal    ST segments:     ST segments:  Normal  T waves:     T waves: inverted      Inverted:  AVL      ED Medication and Procedure Management   Prior to Admission Medications   Prescriptions Last Dose Informant Patient Reported? Taking?   Fluticasone Furoate-Vilanterol 100-25 mcg/actuation inhaler   No No   Sig: Inhale 1 puff daily Rinse mouth after use.   Patient not taking: Reported on 7/15/2024   albuterol (Proventil HFA) 90 mcg/act inhaler   No No   Sig: Inhale 1-2 puffs every 4 (four) hours as needed for wheezing or shortness of breath   albuterol (Proventil HFA) 90 mcg/act inhaler   No Yes   Sig: Inhale 1-2 puffs every 4 (four) hours as needed for wheezing or shortness of breath   benzonatate (TESSALON PERLES) 100 mg capsule   No No   Sig: Take 1 capsule (100 mg total) by mouth every 8 (eight) hours   Patient not taking: Reported on 7/15/2024   guaiFENesin (MUCINEX) 600 mg 12 hr tablet   No No   Sig: Take 1 tablet (600 mg total) by mouth 2 (two) times a day   Patient not taking: Reported on 7/15/2024   methylPREDNISolone 4 MG tablet therapy pack   No No   Sig: Use as directed on package   methylPREDNISolone 4 MG tablet therapy pack   No Yes   Sig: Use as directed on package   metroNIDAZOLE (METROCREAM) 0.75 % cream   No No   Sig: Apply 1 Application topically 2 (two) times a day      Facility-Administered Medications: None     Current Discharge Medication List        CONTINUE these medications which have CHANGED    Details   albuterol (Proventil HFA) 90 mcg/act inhaler Inhale 1-2 puffs every 4 (four) hours as needed for wheezing or shortness of breath  Qty: 18 g, Refills: 0    Comments: Substitution to a formulary equivalent within the same pharmaceutical class is authorized.  Associated Diagnoses: Acute bronchitis with wheezing       methylPREDNISolone 4 MG tablet therapy pack Use as directed on package  Qty: 21 tablet, Refills: 0    Associated Diagnoses: COPD exacerbation (HCC)           CONTINUE these medications which have NOT CHANGED    Details   benzonatate (TESSALON PERLES) 100 mg capsule Take 1 capsule (100 mg total) by mouth every 8 (eight) hours  Qty: 21 capsule, Refills: 0    Associated Diagnoses: Acute bronchitis      Fluticasone Furoate-Vilanterol 100-25 mcg/actuation inhaler Inhale 1 puff daily Rinse mouth after use.  Qty: 60 blister, Refills: 0    Associated Diagnoses: COPD exacerbation (HCC)      guaiFENesin (MUCINEX) 600 mg 12 hr tablet Take 1 tablet (600 mg total) by mouth 2 (two) times a day  Qty: 14 tablet, Refills: 0    Associated Diagnoses: COPD exacerbation (HCC)      metroNIDAZOLE (METROCREAM) 0.75 % cream Apply 1 Application topically 2 (two) times a day  Qty: 45 g, Refills: 0    Associated Diagnoses: Rosacea           No discharge procedures on file.  ED SEPSIS DOCUMENTATION   Time reflects when diagnosis was documented in both MDM as applicable and the Disposition within this note       Time User Action Codes Description Comment    3/18/2025 10:16 PM Brisa Brito [J44.1] COPD with acute exacerbation (HCC)     3/18/2025 11:01 PM Brisa Brito [J44.1] COPD exacerbation (HCC)     3/18/2025 11:01 PM Brisa Brito [J20.9] Acute bronchitis with wheezing                  Brisa Brito MD  03/18/25 6842

## 2025-03-22 ENCOUNTER — HOSPITAL ENCOUNTER (EMERGENCY)
Facility: HOSPITAL | Age: 65
Discharge: HOME | End: 2025-03-22
Attending: EMERGENCY MEDICINE | Admitting: EMERGENCY MEDICINE

## 2025-03-22 VITALS
RESPIRATION RATE: 18 BRPM | SYSTOLIC BLOOD PRESSURE: 146 MMHG | TEMPERATURE: 98.6 F | HEART RATE: 84 BPM | OXYGEN SATURATION: 94 % | DIASTOLIC BLOOD PRESSURE: 75 MMHG

## 2025-03-22 DIAGNOSIS — J20.9 ACUTE BRONCHITIS WITH WHEEZING: ICD-10-CM

## 2025-03-22 DIAGNOSIS — J44.1 COPD EXACERBATION (HCC): Primary | ICD-10-CM

## 2025-03-22 LAB
ALBUMIN SERPL BCG-MCNC: 4.2 G/DL (ref 3.5–5)
ALP SERPL-CCNC: 75 U/L (ref 34–104)
ALT SERPL W P-5'-P-CCNC: 13 U/L (ref 7–52)
ANION GAP SERPL CALCULATED.3IONS-SCNC: 7 MMOL/L (ref 4–13)
AST SERPL W P-5'-P-CCNC: 15 U/L (ref 13–39)
BASOPHILS # BLD AUTO: 0.09 THOUSANDS/ÂΜL (ref 0–0.1)
BASOPHILS NFR BLD AUTO: 1 % (ref 0–1)
BILIRUB SERPL-MCNC: 0.54 MG/DL (ref 0.2–1)
BUN SERPL-MCNC: 18 MG/DL (ref 5–25)
CALCIUM SERPL-MCNC: 8.9 MG/DL (ref 8.4–10.2)
CHLORIDE SERPL-SCNC: 102 MMOL/L (ref 96–108)
CO2 SERPL-SCNC: 30 MMOL/L (ref 21–32)
CREAT SERPL-MCNC: 0.78 MG/DL (ref 0.6–1.3)
EOSINOPHIL # BLD AUTO: 0.37 THOUSAND/ÂΜL (ref 0–0.61)
EOSINOPHIL NFR BLD AUTO: 3 % (ref 0–6)
ERYTHROCYTE [DISTWIDTH] IN BLOOD BY AUTOMATED COUNT: 13.3 % (ref 11.6–15.1)
GFR SERPL CREATININE-BSD FRML MDRD: 94 ML/MIN/1.73SQ M
GLUCOSE SERPL-MCNC: 173 MG/DL (ref 65–140)
HCT VFR BLD AUTO: 43.4 % (ref 36.5–49.3)
HGB BLD-MCNC: 15 G/DL (ref 12–17)
IMM GRANULOCYTES # BLD AUTO: 0.03 THOUSAND/UL (ref 0–0.2)
IMM GRANULOCYTES NFR BLD AUTO: 0 % (ref 0–2)
LIPASE SERPL-CCNC: 9 U/L (ref 11–82)
LYMPHOCYTES # BLD AUTO: 1.49 THOUSANDS/ÂΜL (ref 0.6–4.47)
LYMPHOCYTES NFR BLD AUTO: 12 % (ref 14–44)
MCH RBC QN AUTO: 32.9 PG (ref 26.8–34.3)
MCHC RBC AUTO-ENTMCNC: 34.6 G/DL (ref 31.4–37.4)
MCV RBC AUTO: 95 FL (ref 82–98)
MONOCYTES # BLD AUTO: 1.18 THOUSAND/ÂΜL (ref 0.17–1.22)
MONOCYTES NFR BLD AUTO: 9 % (ref 4–12)
NEUTROPHILS # BLD AUTO: 9.56 THOUSANDS/ÂΜL (ref 1.85–7.62)
NEUTS SEG NFR BLD AUTO: 75 % (ref 43–75)
NRBC BLD AUTO-RTO: 0 /100 WBCS
PLATELET # BLD AUTO: 287 THOUSANDS/UL (ref 149–390)
PMV BLD AUTO: 10.3 FL (ref 8.9–12.7)
POTASSIUM SERPL-SCNC: 3.9 MMOL/L (ref 3.5–5.3)
PROCALCITONIN SERPL-MCNC: <0.05 NG/ML
PROT SERPL-MCNC: 6.9 G/DL (ref 6.4–8.4)
RBC # BLD AUTO: 4.56 MILLION/UL (ref 3.88–5.62)
SODIUM SERPL-SCNC: 139 MMOL/L (ref 135–147)
WBC # BLD AUTO: 12.72 THOUSAND/UL (ref 4.31–10.16)

## 2025-03-22 PROCEDURE — 36415 COLL VENOUS BLD VENIPUNCTURE: CPT | Performed by: EMERGENCY MEDICINE

## 2025-03-22 PROCEDURE — 85025 COMPLETE CBC W/AUTO DIFF WBC: CPT | Performed by: EMERGENCY MEDICINE

## 2025-03-22 PROCEDURE — 99283 EMERGENCY DEPT VISIT LOW MDM: CPT

## 2025-03-22 PROCEDURE — 80053 COMPREHEN METABOLIC PANEL: CPT | Performed by: EMERGENCY MEDICINE

## 2025-03-22 PROCEDURE — 84145 PROCALCITONIN (PCT): CPT | Performed by: EMERGENCY MEDICINE

## 2025-03-22 PROCEDURE — 96374 THER/PROPH/DIAG INJ IV PUSH: CPT

## 2025-03-22 PROCEDURE — 99284 EMERGENCY DEPT VISIT MOD MDM: CPT | Performed by: EMERGENCY MEDICINE

## 2025-03-22 PROCEDURE — 83690 ASSAY OF LIPASE: CPT | Performed by: EMERGENCY MEDICINE

## 2025-03-22 RX ORDER — ALBUTEROL SULFATE 90 UG/1
2 INHALANT RESPIRATORY (INHALATION) ONCE
Status: COMPLETED | OUTPATIENT
Start: 2025-03-22 | End: 2025-03-22

## 2025-03-22 RX ORDER — METHYLPREDNISOLONE SODIUM SUCCINATE 125 MG/2ML
80 INJECTION, POWDER, LYOPHILIZED, FOR SOLUTION INTRAMUSCULAR; INTRAVENOUS ONCE
Status: COMPLETED | OUTPATIENT
Start: 2025-03-22 | End: 2025-03-22

## 2025-03-22 RX ADMIN — METHYLPREDNISOLONE SODIUM SUCCINATE 80 MG: 125 INJECTION, POWDER, FOR SOLUTION INTRAMUSCULAR; INTRAVENOUS at 13:45

## 2025-03-22 RX ADMIN — ALBUTEROL SULFATE 2 PUFF: 90 AEROSOL, METERED RESPIRATORY (INHALATION) at 13:44

## 2025-03-22 NOTE — DISCHARGE INSTRUCTIONS
Please  your prescriptions   [facility]  [id number]  [place] [id number]

## 2025-03-22 NOTE — ED NOTES
"Pt was reluctant in leaving and stated \"I wanted to be admitted. Isn't there enough funding so I can stay?\" Pt was educated on picking up his medications that the doctor ordered him. Pt acknowledged understanding.      Srikanth Nuñez RN  03/22/25 9507    "

## 2025-03-22 NOTE — ED PROVIDER NOTES
"Time reflects when diagnosis was documented in both MDM as applicable and the Disposition within this note       Time User Action Codes Description Comment    3/22/2025  2:12 PM Brisa Brito [J44.1] COPD exacerbation (HCC)     3/22/2025  2:16 PM Brisa Brito [J20.9] Acute bronchitis with wheezing           ED Disposition       ED Disposition   Discharge    Condition   Stable    Date/Time   Sat Mar 22, 2025  2:13 PM    Comment   Albert Jensen discharge to home/self care.                   Assessment & Plan       Medical Decision Making  64 year old male with history of COPD presents for re-evaluation of COPD exacerbation.  Patient prescribed medrol dose pack after his ED visit on 3/18, but did not  the medication and has not been using his inhaler.  Procal remains negative today.  Labs unremarkable with the exception of nonspecific leukocytosis which is likely secondary to steroid use.  Dose of IV solumedrol given.  Albuterol prn.  Patient again referred to pulmonology.  PCP and gen surg follow up for his shoulder mass which has been present for more than 2 years.    Amount and/or Complexity of Data Reviewed  Labs: ordered. Decision-making details documented in ED Course.    Risk  Prescription drug management.        ED Course as of 03/22/25 1538   Sat Mar 22, 2025   1411 Procalcitonin: <0.05       Medications   albuterol (PROVENTIL HFA,VENTOLIN HFA) inhaler 2 puff (2 puffs Inhalation Given 3/22/25 1344)   methylPREDNISolone sodium succinate (Solu-MEDROL) injection 80 mg (80 mg Intravenous Given 3/22/25 1345)       ED Risk Strat Scores                                                History of Present Illness       Chief Complaint   Patient presents with    Medical Problem     Pt returning stating he \"wants to be admitted\". Pt says he has cough. Pt says he has a lipoma on his shoulder       Past Medical History:   Diagnosis Date    Deafness in right ear     Hypertension       Past Surgical " History:   Procedure Laterality Date    SPLENECTOMY, TOTAL        History reviewed. No pertinent family history.   Social History     Tobacco Use    Smoking status: Former     Current packs/day: 0.00     Types: Cigarettes     Quit date: 1987     Years since quittin.5    Smokeless tobacco: Never   Vaping Use    Vaping status: Never Used   Substance Use Topics    Alcohol use: Not Currently    Drug use: Not Currently      E-Cigarette/Vaping    E-Cigarette Use Never User       E-Cigarette/Vaping Substances    Nicotine No     THC No     CBD No     Flavoring No     Other No     Unknown No       I have reviewed and agree with the history as documented.     64 year old male presents for re-evaluation of shortness of breath, this time reporting associated lower abdominal cramping with coughing.  Patient had been seen for COPD exacerbation on 3/18/25 and was discharged with a prescription for medrol dose pack.  Patient states he did not  his prescription and has not been using his albuterol inhaler.  He has not yet made an appointment with pulmonology.      Medical Problem      Review of Systems        Objective       ED Triage Vitals   Temperature Pulse Blood Pressure Respirations SpO2 Patient Position - Orthostatic VS   25 1238 25 1237 25 1237 25 1237 25 1237 --   98.6 °F (37 °C) 84 146/75 18 94 %       Temp Source Heart Rate Source BP Location FiO2 (%) Pain Score    25 1238 -- -- -- --    Temporal          Vitals      Date and Time Temp Pulse SpO2 Resp BP Pain Score FACES Pain Rating User   25 1238 98.6 °F (37 °C) -- -- -- -- -- -- COLLEEN   25 1237 -- 84 94 % 18 146/75 -- -- COLLEEN            Physical Exam  Vitals and nursing note reviewed.   Cardiovascular:      Rate and Rhythm: Normal rate and regular rhythm.      Pulses: Normal pulses.   Pulmonary:      Effort: Pulmonary effort is normal.      Breath sounds: Wheezing present.   Abdominal:      General: There is no  distension.      Palpations: Abdomen is soft.      Tenderness: There is no abdominal tenderness.   Skin:     General: Skin is warm and dry.         Results Reviewed       Procedure Component Value Units Date/Time    Procalcitonin [[id number]]  (Normal) Collected: 03/22/25 1340    Lab Status: Final result Specimen: Blood from Arm, Left Updated: 03/22/25 1410     Procalcitonin <0.05 ng/ml     Comprehensive metabolic panel [[id number]]  (Abnormal) Collected: 03/22/25 1340    Lab Status: Final result Specimen: Blood from Arm, Left Updated: 03/22/25 1400     Sodium 139 mmol/L      Potassium 3.9 mmol/L      Chloride 102 mmol/L      CO2 30 mmol/L      ANION GAP 7 mmol/L      BUN 18 mg/dL      Creatinine 0.78 mg/dL      Glucose 173 mg/dL      Calcium 8.9 mg/dL      AST 15 U/L      ALT 13 U/L      Alkaline Phosphatase 75 U/L      Total Protein 6.9 g/dL      Albumin 4.2 g/dL      Total Bilirubin 0.54 mg/dL      eGFR 94 ml/min/1.73sq m     Narrative:      National Kidney Disease Foundation guidelines for Chronic Kidney Disease (CKD):     Stage 1 with normal or high GFR (GFR > 90 mL/min/1.73 square meters)    Stage 2 Mild CKD (GFR = 60-89 mL/min/1.73 square meters)    Stage 3A Moderate CKD (GFR = 45-59 mL/min/1.73 square meters)    Stage 3B Moderate CKD (GFR = 30-44 mL/min/1.73 square meters)    Stage 4 Severe CKD (GFR = 15-29 mL/min/1.73 square meters)    Stage 5 End Stage CKD (GFR <15 mL/min/1.73 square meters)  Note: GFR calculation is accurate only with a steady state creatinine    Lipase [[id number]]  (Abnormal) Collected: 03/22/25 1340    Lab Status: Final result Specimen: Blood from Arm, Left Updated: 03/22/25 1400     Lipase 9 u/L     CBC and differential [[id number]]  (Abnormal) Collected: 03/22/25 1340    Lab Status: Final result Specimen: Blood from Arm, Left Updated: 03/22/25 1346     WBC 12.72 Thousand/uL      RBC 4.56 Million/uL      Hemoglobin 15.0 g/dL      Hematocrit 43.4 %      MCV 95 fL      MCH 32.9 pg      MCHC  34.6 g/dL      RDW 13.3 %      MPV 10.3 fL      Platelets 287 Thousands/uL      nRBC 0 /100 WBCs      Segmented % 75 %      Immature Grans % 0 %      Lymphocytes % 12 %      Monocytes % 9 %      Eosinophils Relative 3 %      Basophils Relative 1 %      Absolute Neutrophils 9.56 Thousands/µL      Absolute Immature Grans 0.03 Thousand/uL      Absolute Lymphocytes 1.49 Thousands/µL      Absolute Monocytes 1.18 Thousand/µL      Eosinophils Absolute 0.37 Thousand/µL      Basophils Absolute 0.09 Thousands/µL             No orders to display       Procedures    ED Medication and Procedure Management   Prior to Admission Medications   Prescriptions Last Dose Informant Patient Reported? Taking?   Fluticasone Furoate-Vilanterol 100-25 mcg/actuation inhaler   No No   Sig: Inhale 1 puff daily Rinse mouth after use.   Patient not taking: Reported on 7/15/2024   albuterol (Proventil HFA) 90 mcg/act inhaler   No No   Sig: Inhale 1-2 puffs every 4 (four) hours as needed for wheezing or shortness of breath   benzonatate (TESSALON PERLES) 100 mg capsule   No No   Sig: Take 1 capsule (100 mg total) by mouth every 8 (eight) hours   Patient not taking: Reported on 7/15/2024   guaiFENesin (MUCINEX) 600 mg 12 hr tablet   No No   Sig: Take 1 tablet (600 mg total) by mouth 2 (two) times a day   Patient not taking: Reported on 7/15/2024   methylPREDNISolone 4 MG tablet therapy pack   No No   Sig: Use as directed on package   metroNIDAZOLE (METROCREAM) 0.75 % cream   No No   Sig: Apply 1 Application topically 2 (two) times a day      Facility-Administered Medications: None     Discharge Medication List as of 3/22/2025  2:22 PM        CONTINUE these medications which have NOT CHANGED    Details   albuterol (Proventil HFA) 90 mcg/act inhaler Inhale 1-2 puffs every 4 (four) hours as needed for wheezing or shortness of breath, Starting Tue 3/18/2025, Normal      benzonatate (TESSALON PERLES) 100 mg capsule Take 1 capsule (100 mg total) by mouth  every 8 (eight) hours, Starting Sun 7/30/2023, Normal      Fluticasone Furoate-Vilanterol 100-25 mcg/actuation inhaler Inhale 1 puff daily Rinse mouth after use., Starting Fri 6/21/2024, Normal      guaiFENesin (MUCINEX) 600 mg 12 hr tablet Take 1 tablet (600 mg total) by mouth 2 (two) times a day, Starting Thu 6/20/2024, Normal      methylPREDNISolone 4 MG tablet therapy pack Use as directed on package, Normal      metroNIDAZOLE (METROCREAM) 0.75 % cream Apply 1 Application topically 2 (two) times a day, Starting u 6/20/2024, Normal             ED SEPSIS DOCUMENTATION   Time reflects when diagnosis was documented in both MDM as applicable and the Disposition within this note       Time User Action Codes Description Comment    3/22/2025  2:12 PM Brisa Brito [J44.1] COPD exacerbation (HCC)     3/22/2025  2:16 PM Brisa Brito [J20.9] Acute bronchitis with wheezing                  Brisa Brito MD  03/22/25 1538

## 2025-03-23 LAB
ATRIAL RATE: 86 BPM
P AXIS: 69 DEGREES
PR INTERVAL: 152 MS
QRS AXIS: 56 DEGREES
QRSD INTERVAL: 72 MS
QT INTERVAL: 382 MS
QTC INTERVAL: 457 MS
T WAVE AXIS: 80 DEGREES
VENTRICULAR RATE: 86 BPM

## 2025-03-23 PROCEDURE — 93010 ELECTROCARDIOGRAM REPORT: CPT | Performed by: INTERNAL MEDICINE

## 2025-03-24 ENCOUNTER — PATIENT OUTREACH (OUTPATIENT)
Dept: CASE MANAGEMENT | Facility: OTHER | Age: 65
End: 2025-03-24

## 2025-03-24 DIAGNOSIS — Z71.89 ENCOUNTER FOR COORDINATION OF COMPLEX CARE: ICD-10-CM

## 2025-03-24 DIAGNOSIS — J44.9 COPD (CHRONIC OBSTRUCTIVE PULMONARY DISEASE) CASE MANAGEMENT PATIENT (HCC): Primary | ICD-10-CM

## 2025-03-24 NOTE — PROGRESS NOTES
Referral received via email.  Referred to High Utilizer Care Plan Committee on 3/22/25 to be reviewed for careplan.  Case placed on department's Rising Risk Watch List.    Order placed for OP RT CM  referral and IB message sent to BRUCE Osorio for case communication and care coordination.    Order also placed for OP SW CM to assess for social and psychological needs.  Patient appears to have no health insurance and no PCP.    PMH includes COPD and schizophrenia    Inbasket message sent to Vilma Palafox and Paula Dunlap for care coordination and case communication.

## 2025-03-25 ENCOUNTER — TELEPHONE (OUTPATIENT)
Age: 65
End: 2025-03-25

## 2025-03-25 ENCOUNTER — PATIENT OUTREACH (OUTPATIENT)
Dept: CASE MANAGEMENT | Facility: OTHER | Age: 65
End: 2025-03-25

## 2025-03-25 NOTE — TELEPHONE ENCOUNTER
Attempted to contact patient per referral for a COPD Exacerbation, I left a message for patient to return our call. He would be in need of a hospital follow up visit from his admission on 8/18/24, seen by Emily Walden. Thank you.

## 2025-03-25 NOTE — PROGRESS NOTES
OP RT CM received referral from Yaz GARCIA OP RN CM     .OP RT CM called and left a VM requesting a return phone call. I will outreach next week unless I hear back from patient prior.

## 2025-03-26 ENCOUNTER — PATIENT OUTREACH (OUTPATIENT)
Dept: CASE MANAGEMENT | Facility: OTHER | Age: 65
End: 2025-03-26

## 2025-03-26 NOTE — PROGRESS NOTES
"OP RT CM had missed incoming call from Albert. No VM left. OP RT CM will return call.   ______________________________________________  .OP RT CM called and was unable to leave VM for patient. Call stated mailbox was full and could not accept any messages at this time. OP RT CM will attempt outreach again next week unless I hear back from patient prior.    _______________________________________________  OP RT CM received incoming call from Albert. He is stating his breathing is fine. He is raising tan/yellow sputum. He denies fever/chills. Albert declined pulmonary follow up.     Albert stated he will not take the steroid dose pack without an antibiotic. I explained the MD did not order an antibiotic and he can take the steroid without it; however he refuses. He is using Albuterol inhaler only. It appears he leans towards the holistic approach discussing flushing his system with onions etc. Albert stated he is not using Breo.   Supportive listening provided. Albert  has some family issues and does not speak with his family members currently. OP RT CM explained and offered OP SW CM assistance in placing a referral and he declined.     We discussed the need for COPD management and he declined the COPD booklet/zone tools when offered to send to his home. Unable to complete assessment, Albert was brief and needed re-direction with call.      Albert declines further outreach at this time but stated\" I have your number\". No further outreach with OP RT CM at this time.   "

## 2025-03-27 ENCOUNTER — PATIENT OUTREACH (OUTPATIENT)
Dept: CASE MANAGEMENT | Facility: OTHER | Age: 65
End: 2025-03-27

## 2025-03-27 NOTE — PROGRESS NOTES
Email received from High Utilizer Committee on 3/26/25.  Committee reviewed and determined a care plan is not appropriate at this time.  Recommendation from committee is for patient to  become established with a PCP and Pulmonology.  Patient is referred to complex care management as a Rising Utilizer.  OPCM Watch List database updated. HU program opened and I added myself to case team. IB Message sent to OP SW CM with case communication for care coordination.   Will attempt initial outreach with next few days.  Reminder set.

## 2025-03-28 ENCOUNTER — PATIENT OUTREACH (OUTPATIENT)
Dept: CASE MANAGEMENT | Facility: HOSPITAL | Age: 65
End: 2025-03-28

## 2025-03-28 NOTE — PROGRESS NOTES
Received SW referral from Yaz WEISS indicating needs for PCP and health insurance assistance. OP SW CM attempted outreach but unable to connect. In basket reminder sent to self, will attempted to outreach next week. Note routed to team members.

## 2025-04-02 ENCOUNTER — PATIENT OUTREACH (OUTPATIENT)
Dept: CASE MANAGEMENT | Facility: OTHER | Age: 65
End: 2025-04-02

## 2025-04-02 NOTE — PROGRESS NOTES
OutPatient Complex Care Management Note:    Initial telephonic outreach attempted. No answer.  Voice mail reached was generic with no identifying factors.  Left voicemail message with general contact information with name, title, call back phone number office hours when I am available and message encouraging return call to this CM.    Second Telephone outreach attempt scheduled. IB message reminder set.

## 2025-04-08 ENCOUNTER — HOSPITAL ENCOUNTER (INPATIENT)
Facility: HOSPITAL | Age: 65
LOS: 1 days | Discharge: HOME | End: 2025-04-10
Attending: EMERGENCY MEDICINE | Admitting: INTERNAL MEDICINE
Payer: COMMERCIAL

## 2025-04-08 ENCOUNTER — APPOINTMENT (EMERGENCY)
Dept: RADIOLOGY | Facility: HOSPITAL | Age: 65
End: 2025-04-08
Payer: COMMERCIAL

## 2025-04-08 DIAGNOSIS — J45.41 MODERATE PERSISTENT ASTHMA WITH ACUTE EXACERBATION: ICD-10-CM

## 2025-04-08 DIAGNOSIS — J44.1 COPD WITH ACUTE EXACERBATION (HCC): ICD-10-CM

## 2025-04-08 DIAGNOSIS — R06.02 SHORTNESS OF BREATH: Primary | ICD-10-CM

## 2025-04-08 DIAGNOSIS — R06.2 WHEEZING: ICD-10-CM

## 2025-04-08 LAB
ANION GAP SERPL CALCULATED.3IONS-SCNC: 8 MMOL/L (ref 4–13)
BASOPHILS # BLD AUTO: 0.08 THOUSANDS/ÂΜL (ref 0–0.1)
BASOPHILS NFR BLD AUTO: 1 % (ref 0–1)
BUN SERPL-MCNC: 19 MG/DL (ref 5–25)
CALCIUM SERPL-MCNC: 9.3 MG/DL (ref 8.4–10.2)
CHLORIDE SERPL-SCNC: 99 MMOL/L (ref 96–108)
CO2 SERPL-SCNC: 30 MMOL/L (ref 21–32)
CREAT SERPL-MCNC: 0.94 MG/DL (ref 0.6–1.3)
EOSINOPHIL # BLD AUTO: 0.57 THOUSAND/ÂΜL (ref 0–0.61)
EOSINOPHIL NFR BLD AUTO: 5 % (ref 0–6)
ERYTHROCYTE [DISTWIDTH] IN BLOOD BY AUTOMATED COUNT: 13 % (ref 11.6–15.1)
FLUAV RNA RESP QL NAA+PROBE: NEGATIVE
FLUBV RNA RESP QL NAA+PROBE: NEGATIVE
GFR SERPL CREATININE-BSD FRML MDRD: 84 ML/MIN/1.73SQ M
GLUCOSE SERPL-MCNC: 114 MG/DL (ref 65–140)
HCT VFR BLD AUTO: 45.7 % (ref 36.5–49.3)
HGB BLD-MCNC: 15.2 G/DL (ref 12–17)
IMM GRANULOCYTES # BLD AUTO: 0.04 THOUSAND/UL (ref 0–0.2)
IMM GRANULOCYTES NFR BLD AUTO: 0 % (ref 0–2)
LYMPHOCYTES # BLD AUTO: 2.38 THOUSANDS/ÂΜL (ref 0.6–4.47)
LYMPHOCYTES NFR BLD AUTO: 19 % (ref 14–44)
MCH RBC QN AUTO: 31.5 PG (ref 26.8–34.3)
MCHC RBC AUTO-ENTMCNC: 33.3 G/DL (ref 31.4–37.4)
MCV RBC AUTO: 95 FL (ref 82–98)
MONOCYTES # BLD AUTO: 1.42 THOUSAND/ÂΜL (ref 0.17–1.22)
MONOCYTES NFR BLD AUTO: 11 % (ref 4–12)
NEUTROPHILS # BLD AUTO: 8.07 THOUSANDS/ÂΜL (ref 1.85–7.62)
NEUTS SEG NFR BLD AUTO: 64 % (ref 43–75)
NRBC BLD AUTO-RTO: 0 /100 WBCS
PLATELET # BLD AUTO: 361 THOUSANDS/UL (ref 149–390)
PMV BLD AUTO: 10.1 FL (ref 8.9–12.7)
POTASSIUM SERPL-SCNC: 3.5 MMOL/L (ref 3.5–5.3)
RBC # BLD AUTO: 4.83 MILLION/UL (ref 3.88–5.62)
RSV RNA RESP QL NAA+PROBE: NEGATIVE
SARS-COV-2 RNA RESP QL NAA+PROBE: NEGATIVE
SODIUM SERPL-SCNC: 137 MMOL/L (ref 135–147)
WBC # BLD AUTO: 12.56 THOUSAND/UL (ref 4.31–10.16)

## 2025-04-08 PROCEDURE — 80048 BASIC METABOLIC PNL TOTAL CA: CPT | Performed by: PHYSICIAN ASSISTANT

## 2025-04-08 PROCEDURE — 85025 COMPLETE CBC W/AUTO DIFF WBC: CPT | Performed by: PHYSICIAN ASSISTANT

## 2025-04-08 PROCEDURE — 96366 THER/PROPH/DIAG IV INF ADDON: CPT

## 2025-04-08 PROCEDURE — 84145 PROCALCITONIN (PCT): CPT | Performed by: INTERNAL MEDICINE

## 2025-04-08 PROCEDURE — 99285 EMERGENCY DEPT VISIT HI MDM: CPT

## 2025-04-08 PROCEDURE — 0241U HB NFCT DS VIR RESP RNA 4 TRGT: CPT | Performed by: PHYSICIAN ASSISTANT

## 2025-04-08 PROCEDURE — 71046 X-RAY EXAM CHEST 2 VIEWS: CPT

## 2025-04-08 PROCEDURE — 96365 THER/PROPH/DIAG IV INF INIT: CPT

## 2025-04-08 PROCEDURE — 94640 AIRWAY INHALATION TREATMENT: CPT

## 2025-04-08 PROCEDURE — 36415 COLL VENOUS BLD VENIPUNCTURE: CPT | Performed by: PHYSICIAN ASSISTANT

## 2025-04-08 PROCEDURE — 99285 EMERGENCY DEPT VISIT HI MDM: CPT | Performed by: PHYSICIAN ASSISTANT

## 2025-04-08 RX ORDER — IPRATROPIUM BROMIDE AND ALBUTEROL SULFATE 2.5; .5 MG/3ML; MG/3ML
3 SOLUTION RESPIRATORY (INHALATION) ONCE
Status: COMPLETED | OUTPATIENT
Start: 2025-04-08 | End: 2025-04-08

## 2025-04-08 RX ORDER — MAGNESIUM SULFATE HEPTAHYDRATE 40 MG/ML
2 INJECTION, SOLUTION INTRAVENOUS ONCE
Status: COMPLETED | OUTPATIENT
Start: 2025-04-08 | End: 2025-04-09

## 2025-04-08 RX ORDER — AZITHROMYCIN 250 MG/1
500 TABLET, FILM COATED ORAL DAILY
Qty: 8 TABLET | Refills: 0 | Status: SHIPPED | OUTPATIENT
Start: 2025-04-08 | End: 2025-04-08 | Stop reason: CLARIF

## 2025-04-08 RX ORDER — PREDNISONE 20 MG/1
60 TABLET ORAL DAILY
Qty: 12 TABLET | Refills: 0 | Status: SHIPPED | OUTPATIENT
Start: 2025-04-08 | End: 2025-04-10

## 2025-04-08 RX ORDER — PREDNISONE 20 MG/1
60 TABLET ORAL ONCE
Status: DISCONTINUED | OUTPATIENT
Start: 2025-04-08 | End: 2025-04-09

## 2025-04-08 RX ORDER — AZITHROMYCIN 250 MG/1
500 TABLET, FILM COATED ORAL DAILY
Qty: 8 TABLET | Refills: 0 | Status: SHIPPED | OUTPATIENT
Start: 2025-04-08 | End: 2025-04-10

## 2025-04-08 RX ADMIN — IPRATROPIUM BROMIDE AND ALBUTEROL SULFATE 3 ML: 2.5; .5 SOLUTION RESPIRATORY (INHALATION) at 23:00

## 2025-04-08 RX ADMIN — SODIUM CHLORIDE 500 ML: 0.9 INJECTION, SOLUTION INTRAVENOUS at 22:41

## 2025-04-08 RX ADMIN — IPRATROPIUM BROMIDE AND ALBUTEROL SULFATE 3 ML: 2.5; .5 SOLUTION RESPIRATORY (INHALATION) at 22:30

## 2025-04-08 RX ADMIN — MAGNESIUM SULFATE HEPTAHYDRATE 2 G: 40 INJECTION, SOLUTION INTRAVENOUS at 22:42

## 2025-04-09 ENCOUNTER — PATIENT OUTREACH (OUTPATIENT)
Dept: CASE MANAGEMENT | Facility: OTHER | Age: 65
End: 2025-04-09

## 2025-04-09 LAB
ANION GAP SERPL CALCULATED.3IONS-SCNC: 9 MMOL/L (ref 4–13)
BASOPHILS # BLD AUTO: 0.07 THOUSANDS/ÂΜL (ref 0–0.1)
BASOPHILS NFR BLD AUTO: 1 % (ref 0–1)
BUN SERPL-MCNC: 14 MG/DL (ref 5–25)
CALCIUM SERPL-MCNC: 8.6 MG/DL (ref 8.4–10.2)
CHLORIDE SERPL-SCNC: 102 MMOL/L (ref 96–108)
CO2 SERPL-SCNC: 27 MMOL/L (ref 21–32)
CREAT SERPL-MCNC: 0.76 MG/DL (ref 0.6–1.3)
EOSINOPHIL # BLD AUTO: 0.22 THOUSAND/ÂΜL (ref 0–0.61)
EOSINOPHIL NFR BLD AUTO: 2 % (ref 0–6)
ERYTHROCYTE [DISTWIDTH] IN BLOOD BY AUTOMATED COUNT: 13.2 % (ref 11.6–15.1)
GFR SERPL CREATININE-BSD FRML MDRD: 95 ML/MIN/1.73SQ M
GLUCOSE SERPL-MCNC: 181 MG/DL (ref 65–140)
HCT VFR BLD AUTO: 41.6 % (ref 36.5–49.3)
HGB BLD-MCNC: 13.8 G/DL (ref 12–17)
IMM GRANULOCYTES # BLD AUTO: 0.09 THOUSAND/UL (ref 0–0.2)
IMM GRANULOCYTES NFR BLD AUTO: 1 % (ref 0–2)
LYMPHOCYTES # BLD AUTO: 0.76 THOUSANDS/ÂΜL (ref 0.6–4.47)
LYMPHOCYTES NFR BLD AUTO: 7 % (ref 14–44)
MCH RBC QN AUTO: 31.2 PG (ref 26.8–34.3)
MCHC RBC AUTO-ENTMCNC: 33.2 G/DL (ref 31.4–37.4)
MCV RBC AUTO: 94 FL (ref 82–98)
MONOCYTES # BLD AUTO: 0.4 THOUSAND/ÂΜL (ref 0.17–1.22)
MONOCYTES NFR BLD AUTO: 4 % (ref 4–12)
NEUTROPHILS # BLD AUTO: 9.95 THOUSANDS/ÂΜL (ref 1.85–7.62)
NEUTS SEG NFR BLD AUTO: 85 % (ref 43–75)
NRBC BLD AUTO-RTO: 0 /100 WBCS
PLATELET # BLD AUTO: 320 THOUSANDS/UL (ref 149–390)
PMV BLD AUTO: 10.3 FL (ref 8.9–12.7)
POTASSIUM SERPL-SCNC: 3.3 MMOL/L (ref 3.5–5.3)
PROCALCITONIN SERPL-MCNC: 0.06 NG/ML
RBC # BLD AUTO: 4.42 MILLION/UL (ref 3.88–5.62)
SODIUM SERPL-SCNC: 138 MMOL/L (ref 135–147)
WBC # BLD AUTO: 11.49 THOUSAND/UL (ref 4.31–10.16)

## 2025-04-09 PROCEDURE — 94760 N-INVAS EAR/PLS OXIMETRY 1: CPT

## 2025-04-09 PROCEDURE — 94644 CONT INHLJ TX 1ST HOUR: CPT

## 2025-04-09 PROCEDURE — 94640 AIRWAY INHALATION TREATMENT: CPT

## 2025-04-09 PROCEDURE — 99222 1ST HOSP IP/OBS MODERATE 55: CPT | Performed by: INTERNAL MEDICINE

## 2025-04-09 PROCEDURE — 94664 DEMO&/EVAL PT USE INHALER: CPT

## 2025-04-09 PROCEDURE — 85025 COMPLETE CBC W/AUTO DIFF WBC: CPT | Performed by: INTERNAL MEDICINE

## 2025-04-09 PROCEDURE — 99255 IP/OBS CONSLTJ NEW/EST HI 80: CPT | Performed by: INTERNAL MEDICINE

## 2025-04-09 PROCEDURE — 80048 BASIC METABOLIC PNL TOTAL CA: CPT | Performed by: INTERNAL MEDICINE

## 2025-04-09 RX ORDER — HEPARIN SODIUM 5000 [USP'U]/ML
5000 INJECTION, SOLUTION INTRAVENOUS; SUBCUTANEOUS EVERY 8 HOURS SCHEDULED
Status: DISCONTINUED | OUTPATIENT
Start: 2025-04-09 | End: 2025-04-10 | Stop reason: HOSPADM

## 2025-04-09 RX ORDER — AZITHROMYCIN 500 MG/1
500 TABLET, FILM COATED ORAL
Status: DISCONTINUED | OUTPATIENT
Start: 2025-04-09 | End: 2025-04-10

## 2025-04-09 RX ORDER — FLUTICASONE FUROATE AND VILANTEROL 100; 25 UG/1; UG/1
1 POWDER RESPIRATORY (INHALATION) DAILY
Status: DISCONTINUED | OUTPATIENT
Start: 2025-04-09 | End: 2025-04-10 | Stop reason: HOSPADM

## 2025-04-09 RX ORDER — SODIUM CHLORIDE FOR INHALATION 0.9 %
12 VIAL, NEBULIZER (ML) INHALATION ONCE
Status: COMPLETED | OUTPATIENT
Start: 2025-04-09 | End: 2025-04-09

## 2025-04-09 RX ORDER — BENZONATATE 100 MG/1
100 CAPSULE ORAL 3 TIMES DAILY PRN
Status: DISCONTINUED | OUTPATIENT
Start: 2025-04-09 | End: 2025-04-10 | Stop reason: HOSPADM

## 2025-04-09 RX ORDER — METHYLPREDNISOLONE SODIUM SUCCINATE 40 MG/ML
40 INJECTION, POWDER, LYOPHILIZED, FOR SOLUTION INTRAMUSCULAR; INTRAVENOUS EVERY 12 HOURS SCHEDULED
Status: DISCONTINUED | OUTPATIENT
Start: 2025-04-09 | End: 2025-04-09

## 2025-04-09 RX ORDER — ALBUTEROL SULFATE 90 UG/1
1 INHALANT RESPIRATORY (INHALATION) EVERY 4 HOURS PRN
Status: DISCONTINUED | OUTPATIENT
Start: 2025-04-09 | End: 2025-04-10 | Stop reason: HOSPADM

## 2025-04-09 RX ORDER — POTASSIUM CHLORIDE 1500 MG/1
40 TABLET, EXTENDED RELEASE ORAL ONCE
Status: COMPLETED | OUTPATIENT
Start: 2025-04-09 | End: 2025-04-09

## 2025-04-09 RX ORDER — GUAIFENESIN 600 MG/1
600 TABLET, EXTENDED RELEASE ORAL 2 TIMES DAILY
Status: DISCONTINUED | OUTPATIENT
Start: 2025-04-09 | End: 2025-04-10 | Stop reason: HOSPADM

## 2025-04-09 RX ORDER — ALBUTEROL SULFATE 5 MG/ML
10 SOLUTION RESPIRATORY (INHALATION) ONCE
Status: COMPLETED | OUTPATIENT
Start: 2025-04-09 | End: 2025-04-09

## 2025-04-09 RX ORDER — SODIUM CHLORIDE FOR INHALATION 0.9 %
3 VIAL, NEBULIZER (ML) INHALATION
Status: DISCONTINUED | OUTPATIENT
Start: 2025-04-09 | End: 2025-04-10

## 2025-04-09 RX ORDER — PREDNISONE 20 MG/1
40 TABLET ORAL DAILY
Status: DISCONTINUED | OUTPATIENT
Start: 2025-04-10 | End: 2025-04-10 | Stop reason: HOSPADM

## 2025-04-09 RX ORDER — ACETAMINOPHEN 325 MG/1
650 TABLET ORAL EVERY 6 HOURS PRN
Status: DISCONTINUED | OUTPATIENT
Start: 2025-04-09 | End: 2025-04-10 | Stop reason: HOSPADM

## 2025-04-09 RX ORDER — LEVALBUTEROL INHALATION SOLUTION 1.25 MG/3ML
1.25 SOLUTION RESPIRATORY (INHALATION)
Status: DISCONTINUED | OUTPATIENT
Start: 2025-04-09 | End: 2025-04-10 | Stop reason: HOSPADM

## 2025-04-09 RX ORDER — METHYLPREDNISOLONE SODIUM SUCCINATE 40 MG/ML
40 INJECTION, POWDER, LYOPHILIZED, FOR SOLUTION INTRAMUSCULAR; INTRAVENOUS EVERY 12 HOURS SCHEDULED
Status: COMPLETED | OUTPATIENT
Start: 2025-04-09 | End: 2025-04-09

## 2025-04-09 RX ORDER — LORATADINE 10 MG/1
10 TABLET ORAL DAILY
Status: DISCONTINUED | OUTPATIENT
Start: 2025-04-09 | End: 2025-04-10 | Stop reason: HOSPADM

## 2025-04-09 RX ADMIN — LEVALBUTEROL HYDROCHLORIDE 1.25 MG: 1.25 SOLUTION RESPIRATORY (INHALATION) at 07:24

## 2025-04-09 RX ADMIN — IPRATROPIUM BROMIDE 0.5 MG: 0.5 SOLUTION RESPIRATORY (INHALATION) at 07:24

## 2025-04-09 RX ADMIN — LEVALBUTEROL HYDROCHLORIDE 1.25 MG: 1.25 SOLUTION RESPIRATORY (INHALATION) at 14:00

## 2025-04-09 RX ADMIN — ISODIUM CHLORIDE 3 ML: 0.03 SOLUTION RESPIRATORY (INHALATION) at 14:00

## 2025-04-09 RX ADMIN — ISODIUM CHLORIDE 3 ML: 0.03 SOLUTION RESPIRATORY (INHALATION) at 07:24

## 2025-04-09 RX ADMIN — METHYLPREDNISOLONE SODIUM SUCCINATE 40 MG: 40 INJECTION, POWDER, FOR SOLUTION INTRAMUSCULAR; INTRAVENOUS at 02:22

## 2025-04-09 RX ADMIN — AZITHROMYCIN DIHYDRATE 500 MG: 500 TABLET ORAL at 02:22

## 2025-04-09 RX ADMIN — ISODIUM CHLORIDE 3 ML: 0.03 SOLUTION RESPIRATORY (INHALATION) at 19:18

## 2025-04-09 RX ADMIN — METHYLPREDNISOLONE SODIUM SUCCINATE 40 MG: 40 INJECTION, POWDER, FOR SOLUTION INTRAMUSCULAR; INTRAVENOUS at 09:02

## 2025-04-09 RX ADMIN — METHYLPREDNISOLONE SODIUM SUCCINATE 40 MG: 40 INJECTION, POWDER, FOR SOLUTION INTRAMUSCULAR; INTRAVENOUS at 20:15

## 2025-04-09 RX ADMIN — ISODIUM CHLORIDE 12 ML: 0.03 SOLUTION RESPIRATORY (INHALATION) at 00:25

## 2025-04-09 RX ADMIN — IPRATROPIUM BROMIDE 1 MG: 0.5 SOLUTION RESPIRATORY (INHALATION) at 00:25

## 2025-04-09 RX ADMIN — POTASSIUM CHLORIDE 40 MEQ: 1500 TABLET, EXTENDED RELEASE ORAL at 09:01

## 2025-04-09 RX ADMIN — IPRATROPIUM BROMIDE 0.5 MG: 0.5 SOLUTION RESPIRATORY (INHALATION) at 14:00

## 2025-04-09 RX ADMIN — LEVALBUTEROL HYDROCHLORIDE 1.25 MG: 1.25 SOLUTION RESPIRATORY (INHALATION) at 19:18

## 2025-04-09 RX ADMIN — ALBUTEROL SULFATE 10 MG: 2.5 SOLUTION RESPIRATORY (INHALATION) at 00:25

## 2025-04-09 NOTE — CASE MANAGEMENT
Case Management Assessment & Discharge Planning Note    Patient name Albert Jensen  Location /-01 MRN [id number]  : 1960 Date 2025       Current Admission Date: 2025  Current Admission Diagnosis:Shortness of breath   Patient Active Problem List    Diagnosis Date Noted Date Diagnosed    Rosacea 2024     Skin tumor 2024     Shortness of breath 2024     Elevated blood pressure reading 2024     Varicose veins of both lower extremities 2021     Acute bronchitis 2021     Suspected reactive airway disease 2021     Schizophrenia (HCC) 2021     Hypokalemia 2021       LOS (days): 0  Geometric Mean LOS (GMLOS) (days):   Days to GMLOS:     OBJECTIVE:    Risk of Unplanned Readmission Score: 20.48         Current admission status: Inpatient       Preferred Pharmacy:   [facility]/pharmacy #[id number] - [place] - [phone] [id number]  Phone: [phone] Fax: [phone]    Primary Care Provider: Renzo Grullon MD    Primary Insurance: [employer]  Secondary Insurance:     ASSESSMENT:  Active Health Care Proxies    There are no active Health Care Proxies on file.                 Readmission Root Cause  30 Day Readmission: No    Patient Information  Admitted from:: Home  Mental Status: Alert  During Assessment patient was accompanied by: Not accompanied during assessment  Assessment information provided by:: Patient  Primary Caregiver: Self  Support Systems: Self  County of Residence: [place]  What city do you live in?: [place]  Home entry access options. Select all that apply.: Stairs  Number of steps to enter home.: 2  Type of Current Residence: [facility]  Living Arrangements: Lives Alone    Activities of Daily Living Prior to Admission  Functional Status: Independent  Completes ADLs independently?: Yes  Ambulates independently?: Yes  Does patient use assisted devices?: No  Does patient currently own DME?: No  Does patient have a  history of Outpatient Therapy (PT/OT)?: No  Does the patient have a history of Short-Term Rehab?: No  Does patient have a history of HHC?: No  Does patient currently have HHC?: No         Patient Information Continued  Income Source: Pension/FCI  Does patient have prescription coverage?: Yes  Can the patient afford their medications and any related supplies (such as glucometers or test strips)?: Yes  Does patient receive dialysis treatments?: No  Does patient have a history of substance abuse?: No  Does patient have a history of Mental Health Diagnosis?: Yes (schizophrenia)  Is patient receiving treatment for mental health?: No. Patient declined treatment information.  Has patient received inpatient treatment related to mental health in the last 2 years?: No                    DISCHARGE DETAILS:    Discharge planning discussed with:: Patient  Freedom of Choice: Yes     CM contacted family/caregiver?: No- see comments (Pt is alert and oriented)  Were Treatment Team discharge recommendations reviewed with patient/caregiver?: Yes  Did patient/caregiver verbalize understanding of patient care needs?: Yes  Were patient/caregiver advised of the risks associated with not following Treatment Team discharge recommendations?: Yes         Requested Home Health Care         Is the patient interested in HHC at discharge?: No    DME Referral Provided  Referral made for DME?: No              Treatment Team Recommendation: Home  Discharge Destination Plan:: Home                                         Additional Comments: CM met with pt todiscuss role of CM and any needs prior to discharge. Pt resides alone in ran home w/ 2STE. Indp PTA. No DME. No hx STR/HH/OP PT/DA. Hx schizophrenia. No IP psych stays. Pt prefers [facility] in [place]. Pt is retired and drives.

## 2025-04-09 NOTE — PROGRESS NOTES
OP.CM SW received ADT alert regarding patient. Chart review completed. Patient presented to [facility] on 04/08 due to SOB. Yaz attempted outreach to patient on 04/02 and then on 03/28. JAMILA CM will continue to follow & remain available as needed. Reminder sent to follow up

## 2025-04-09 NOTE — ASSESSMENT & PLAN NOTE
Patient with known skin mass on his right shoulder present for years.   Had been seen by surgery and and had plan for excision of right shoulder skin mass.  However patient failed to complete preadmission testing and preoperative clearance.   Recommend patient follow-up with surgery once discharged

## 2025-04-09 NOTE — ASSESSMENT & PLAN NOTE
Patient with multiple ER visits over the past several months, with suspected acute COPD exacerbations. Suspect shortness of breath related to uncontrolled COPD vs underlying reactive airway symptoms   CXR is unremarkable  WBC slightly elevated (11.49, 12.56) likely in setting of steroid use. Procal negative. Flu/RSV/Covid negative  Home regimen: Breo 100 but is noncompliant, Albuterol  Azithromycin for 3 day course  Continue atrovent/xopenex nebs TID  Continue IV solumedrol 40mg q12h. Can decrease to prednisone tomorrow  He does have noted trending elevated eosinophils (220, 570, 370) he could have underlying reactive airway disease with exacerbation secondary to environmental triggers/allergens.   Recommend OP pulmonary follow up with PFTs and allergy workup

## 2025-04-09 NOTE — PLAN OF CARE
Problem: INFECTION - ADULT  Goal: Absence or prevention of progression during hospitalization  Description: INTERVENTIONS:- Assess and monitor for signs and symptoms of infection- Monitor lab/diagnostic results- Monitor all insertion sites, i.e. indwelling lines, tubes, and drains- Monitor endotracheal if appropriate and nasal secretions for changes in amount and color- [place] appropriate cooling/warming therapies per order- Administer medications as ordered- Instruct and encourage patient and family to use good hand hygiene technique- Identify and instruct in appropriate isolation precautions for identified infection/condition  Outcome: Progressing  Goal: Absence of fever/infection during neutropenic period  Description: INTERVENTIONS:- Monitor WBC  Outcome: Progressing     Problem: SAFETY ADULT  Goal: Patient will remain free of falls  Description: INTERVENTIONS:- Educate patient/family on patient safety including physical limitations- Instruct patient to call for assistance with activity - Consult OT/PT to assist with strengthening/mobility - Keep Call bell within reach- Keep bed low and locked with side rails adjusted as appropriate- Keep care items and personal belongings within reach- Initiate and maintain comfort rounds- Make Fall Risk Sign visible to staff- Offer Toileting every  Hours, in advance of need- Initiate/Maintain alarm- Obtain necessary fall risk management equipment: - Apply yellow socks and bracelet for high fall risk patients- Consider moving patient to room near nurses station  Outcome: Progressing     Problem: RESPIRATORY - ADULT  Goal: Achieves optimal ventilation and oxygenation  Description: INTERVENTIONS:- Assess for changes in respiratory status- Assess for changes in mentation and behavior- Position to facilitate oxygenation and minimize respiratory effort- Oxygen administered by appropriate delivery if ordered- Initiate smoking cessation education as indicated- Encourage  broncho-pulmonary hygiene including cough, deep breathe, Incentive Spirometry- Assess the need for suctioning and aspirate as needed- Assess and instruct to report SOB or any respiratory difficulty- Respiratory Therapy support as indicated  Outcome: Progressing

## 2025-04-09 NOTE — UTILIZATION REVIEW
Initial Clinical Review    Admission: Date/Time/Statement:  4/9/25 0032 observation   Admission Orders (From admission, onward)       Ordered        04/09/25 0032  Place in Observation  Once                          Orders Placed This Encounter   Procedures    Place in Observation     Standing Status:   Standing     Number of Occurrences:   1     Level of Care:   Med Surg [16]     ED Arrival Information       Expected   -    Arrival   4/8/2025 20:20    Acuity   Less Urgent              Means of arrival   Walk-In    Escorted by   Self    Service   Hospitalist    Admission type   Emergency              Arrival complaint   SOB             Chief Complaint   Patient presents with    Medical Problem       Initial Presentation: 64 y.o. male  to ED via walk in from home.    Admitted to observation with Dx: Shortness of Breath, suspected COPD/Skin tumor/Schizophrenia.  Presented to ED with shortness of breath and intermittent wheezing starting 3 days prior to arrival.    Seen in ED 3/22/25 and treated for COPD exacerbation.    PMHx: Schizophrenia, BCC.COPD exacerbation, hypertension, surgical history of total splenectomy.    On exam: lungs wheezing.   Wbc 12.56.   Imaging shows no acute findings on CxR. ED treatment:  Duo Neb, Pinon Neb, Prednisone, MG and IVF bolus.    Plan includes to continue steroids and switch to  IV Solu medrol.  Scheduled nebs.  Azithromycin for 3 days.  Albuterol as needed.  Oximetry and oxygen as needed.  Consult Pulmonary.   OP follow up with surgery for known skin tumor.     Anticipated Length of Stay/Certification Statement: Patient will be admitted on an observation basis with an anticipated length of stay of less than 2 midnights secondary to Shortness of breath.     ED Treatment-Medication Administration from 04/08/2025 2019 to 04/09/2025 0112         Date/Time Order Dose Route Action     04/08/2025 2230 ipratropium-albuterol (DUO-NEB) 0.5-2.5 mg/3 mL inhalation solution 3 mL 3 mL Nebulization  Given     04/08/2025 2300 ipratropium-albuterol (DUO-NEB) 0.5-2.5 mg/3 mL inhalation solution 3 mL 3 mL Nebulization Given     04/08/2025 2242 magnesium sulfate 2 g/50 mL IVPB (premix) 2 g 2 g Intravenous New Bag     04/08/2025 2241 sodium chloride 0.9 % bolus 500 mL 500 mL Intravenous New Bag     04/09/2025 0025 albuterol inhalation solution 10 mg 10 mg Nebulization Given     04/09/2025 0025 ipratropium (ATROVENT) 0.02 % inhalation solution 1 mg 1 mg Nebulization Given     04/09/2025 0025 sodium chloride 0.9 % inhalation solution 12 mL 12 mL Nebulization Given            Scheduled Medications:  azithromycin, 500 mg, Oral, Q24H LEXI  guaiFENesin, 600 mg, Oral, BID  heparin (porcine), 5,000 Units, Subcutaneous, Q8H LEXI  ipratropium, 0.5 mg, Nebulization, TID  levalbuterol, 1.25 mg, Nebulization, TID   And  sodium chloride, 3 mL, Nebulization, TID  loratadine, 10 mg, Oral, Daily  methylPREDNISolone sodium succinate, 40 mg, Intravenous, Q12H LEXI    Continuous IV Infusions:     PRN Meds: not used.   acetaminophen, 650 mg, Oral, Q6H PRN  albuterol, 1 puff, Inhalation, Q4H PRN  benzonatate, 100 mg, Oral, TID PRN      ED Triage Vitals [04/08/25 2025]   Temperature Pulse Respirations Blood Pressure SpO2 Pain Score   98.2 °F (36.8 °C) 86 22 170/85 99 % 6     Weight (last 2 days)       Date/Time Weight    04/09/25 0232 86.3 (190.2)    04/08/25 2025 87.5 (192.9)          Vital Signs (last 3 days)       Date/Time Temp Pulse Resp BP MAP (mmHg) SpO2 O2 Device Patient Position - Orthostatic VS [place]iana Coma Scale Score Pain    04/09/25 0232 -- -- -- -- -- -- -- -- -- No Pain    04/09/25 0154 -- -- -- -- -- -- None (Room air) -- 15 No Pain    04/09/25 01:24:10 97.2 °F (36.2 °C) 81 18 149/85 106 99 % -- -- -- --    04/09/25 0026 -- -- -- -- -- -- None (Room air) -- -- --    04/08/25 2300 -- -- -- -- -- -- -- -- 15 --    04/08/25 2102 -- -- -- -- -- -- -- -- 15 No Pain    04/08/25 2025 98.2 °F (36.8 °C) 86 22 170/85 -- 99 % None (Room  air) Sitting -- 6          Pertinent Labs/Diagnostic Test Results:   Radiology:  XR chest 2 views   ED Interpretation by Geoffrey Llanes PA-C (04/08 2252)   No acute cardiopulmonary disease on initial read by me        Cardiology:  No orders to display     GI:  No orders to display     Results from last 7 days   Lab Units 04/08/25 2239   SARS-COV-2  Negative     Results from last 7 days   Lab Units 04/09/25  0432 04/08/25  2239   WBC Thousand/uL 11.49* 12.56*   HEMOGLOBIN g/dL 13.8 15.2   HEMATOCRIT % 41.6 45.7   PLATELETS Thousands/uL 320 361   TOTAL NEUT ABS Thousands/µL 9.95* 8.07*     Results from last 7 days   Lab Units 04/09/25  0432 04/08/25 2239   SODIUM mmol/L 138 137   POTASSIUM mmol/L 3.3* 3.5   CHLORIDE mmol/L 102 99   CO2 mmol/L 27 30   ANION GAP mmol/L 9 8   BUN mg/dL 14 19   CREATININE mg/dL 0.76 0.94   EGFR ml/min/1.73sq m 95 84   CALCIUM mg/dL 8.6 9.3     Results from last 7 days   Lab Units 04/09/25  0432 04/08/25  2239   GLUCOSE RANDOM mg/dL 181* 114     Results from last 7 days   Lab Units 04/08/25  2239   PROCALCITONIN ng/ml 0.06     Results from last 7 days   Lab Units 04/08/25 2239   INFLUENZA A PCR  Negative   INFLUENZA B PCR  Negative   RSV PCR  Negative     Past Medical History:   Diagnosis Date    Deafness in right ear     Hypertension      Present on Admission:   Shortness of breath   Schizophrenia (HCC)   Skin tumor    Admitting Diagnosis: Wheezing [R06.2]  SOB (shortness of breath) [R06.02]  COPD with acute exacerbation (HCC) [J44.1]  Age/Sex: 64 y.o. male    Network Utilization Review Department  ATTENTION: Please call with any questions or concerns to [phone] and carefully listen to the prompts so that you are directed to the right person. All voicemails are confidential.   For Discharge needs, contact Care Management DC Support Team at [phone] opt. 2  Send all requests for admission clinical reviews, approved or denied determinations and any other requests to dedicated fax  number below belonging to the campus where the patient is receiving treatment. List of dedicated fax numbers for the Facilities:  FACILITY NAME UR FAX NUMBER   ADMISSION DENIALS (Administrative/Medical Necessity) [phone]   DISCHARGE SUPPORT TEAM (NETWORK) [phone]   PARENT CHILD HEALTH (Maternity/NICU/Pediatrics) [phone]   [facility] [phone]   [facility] [phone]   [facility] [phone]   [facility] [phone]   [facility] [phone]   [facility] [phone]   [facility] [phone]   [facility] [phone]   [facility] [phone]   [facility] [phone]   [facility] [phone]   [facility] [phone]

## 2025-04-09 NOTE — PLAN OF CARE
Problem: INFECTION - ADULT  Goal: Absence or prevention of progression during hospitalization  Description: INTERVENTIONS:- Assess and monitor for signs and symptoms of infection- Monitor lab/diagnostic results- Monitor all insertion sites, i.e. indwelling lines, tubes, and drains- Monitor endotracheal if appropriate and nasal secretions for changes in amount and color- [place] appropriate cooling/warming therapies per order- Administer medications as ordered- Instruct and encourage patient and family to use good hand hygiene technique- Identify and instruct in appropriate isolation precautions for identified infection/condition  Outcome: Progressing     Problem: RESPIRATORY - ADULT  Goal: Achieves optimal ventilation and oxygenation  Description: INTERVENTIONS:- Assess for changes in respiratory status- Assess for changes in mentation and behavior- Position to facilitate oxygenation and minimize respiratory effort- Oxygen administered by appropriate delivery if ordered- Initiate smoking cessation education as indicated- Encourage broncho-pulmonary hygiene including cough, deep breathe, Incentive Spirometry- Assess the need for suctioning and aspirate as needed- Assess and instruct to report SOB or any respiratory difficulty- Respiratory Therapy support as indicated  Outcome: Progressing

## 2025-04-09 NOTE — RESPIRATORY THERAPY NOTE
RT Protocol Note  Albert Jensen 64 y.o. male MRN: [id number]  Unit/Bed#: -01 Encounter: [id number]    Assessment    Principal Problem:    Shortness of breath  Active Problems:    Schizophrenia (HCC)    Skin tumor      Home Pulmonary Medications:    Home Devices/Therapy:  (Albuterol inhaler)    Past Medical History:   Diagnosis Date    Deafness in right ear     Hypertension      Social History     Socioeconomic History    Marital status: Single     Spouse name: Not on file    Number of children: Not on file    Years of education: Not on file    Highest education level: Not on file   Occupational History    Not on file   Tobacco Use    Smoking status: Former     Current packs/day: 0.00     Types: Cigarettes     Quit date: 1987     Years since quittin.5    Smokeless tobacco: Never   Vaping Use    Vaping status: Never Used   Substance and Sexual Activity    Alcohol use: Not Currently    Drug use: Not Currently    Sexual activity: Not Currently   Other Topics Concern    Not on file   Social History Narrative    Not on file     Social Drivers of Health     Financial Resource Strain: Not on file   Food Insecurity: Patient Declined (2025)    Nursing - Inadequate Food Risk Classification     Worried About Running Out of Food in the Last Year: Never true     Ran Out of Food in the Last Year: Never true     Ran Out of Food in the Last Year: Patient declined   Transportation Needs: No Transportation Needs (2025)    Nursing - Transportation Risk Classification     Lack of Transportation: Not on file     Lack of Transportation: No   Physical Activity: Not on file   Stress: Not on file   Social Connections: Not on file   Intimate Partner Violence: Unknown (2025)    Nursing IPS     Feels Physically and Emotionally Safe: Not on file     Physically Hurt by Someone: Not on file     Humiliated or Emotionally Abused by Someone: Not on file     Physically Hurt by Someone: No     Hurt or Threatened by Someone:  "No   Housing Stability: Unknown (2025)    Nursing: Inadequate Housing Risk Classification     Has Housing: Not on file     Worried About Losing Housing: Not on file     Unable to Get Utilities: Not on file     Unable to Pay for Housing in the Last Year: No     Has Housin       Subjective         Objective    Physical Exam:   Assessment Type: Assess only  General Appearance: Awake  Respiratory Pattern: Normal  Chest Assessment: Chest expansion symmetrical  Bilateral Breath Sounds: Diminished, Expiratory wheezes, Inspiratory wheezes  Cough: Non-productive  O2 Device: RA    Vitals:  Blood pressure 149/85, pulse 81, temperature (!) 97.2 °F (36.2 °C), temperature source Temporal, resp. rate 18, height 5' 7\" (1.702 m), weight 86.3 kg (190 lb 3.2 oz), SpO2 99%.          Imaging and other studies: Results Review Statement: No pertinent imaging studies reviewed.    O2 Device: RA     Plan    Respiratory Plan: Mild Distress pathway, Home Bronchodilator Patient pathway        Resp Comments: PT has HX of actue bronchitis/SOB. PT has home albuterol inhaler listed as home meds. PT admitted with SOB, on RA at this time. Plan to keep scheduled nebs [place]   "

## 2025-04-09 NOTE — H&P
H&P - Hospitalist   Name: Albert Jensen 64 y.o. male I MRN: [id number]  Unit/Bed#: -01 I Date of Admission: 4/8/2025   Date of Service: 4/9/2025 I Hospital Day: 0     Assessment & Plan  Shortness of breath  Presents due to worsening shortness of breath associated with wheezing over the past 3 days  Seen in ER on 3/18. Prescribed medrol dose lyndon. Patient seen again on 3/22 and informed provider he had not picked up steroid since he does not feel comfortable taking without azithromycin. Encourage to . Patient picked up but has only intermittently taken a couple pills from it.   Asking for azithromycin. Denying fevers/chills.   Patient with suspected COPD however has not had PFTs performed. Prior similar admission in June 2024. Patient was recommended to follow up with pulmonary outpatient and has not done so. Over the past year with multiple ER visits for SOB/wheezing.   Covid/flu/rsv negative   WBC 12.56  Suspect elevation in setting of steroid use.  Denies fevers.  Pro-Farzad pending  Home regimen:   Albuterol prn   Plan   IV solumedrol 40 mg q 12 hours. Most likely can be transitioned to oral prednisone tomorrow.   Xopenox/atrovent tid   PO Azithromycin 500 mg x 3 days   Albuterol prn   Respiratory protocol   Consult pulmonology  Skin tumor  Patient with known skin mass on his right shoulder present for years.   Had been seen by surgery and and had plan for excision of right shoulder skin mass.  However patient failed to complete preadmission testing and preoperative clearance.   Recommend patient follow-up with surgery once discharged  Schizophrenia (HCC)  Not on any antipsychotics outpatient      VTE Pharmacologic Prophylaxis: VTE Score: 3 Moderate Risk (Score 3-4) - Pharmacological DVT Prophylaxis Ordered: heparin.  Code Status: Level 1 - Full Code   Discussion with family: Patient declined call to .     Anticipated Length of Stay: Patient will be admitted on an observation basis with an  "anticipated length of stay of less than 2 midnights secondary to Shortness of breath.    History of Present Illness   Chief Complaint: \"I need an antibiotic\"     Albert Jensen is a 64 y.o. male with a PMH of schizophrenia,BCC who presents due to worsening shortness of breath associated with wheezing over the past couple of days.  Chronic cough.  Denies fevers or chills.  Frequent ER visits for similar.  Has not seen pulmonary outpatient.  No PFTs.  Seen in the ER on 3/18.  Prescribed Medrol Dosepak.  Patient was then seen again on 3/22 and inform provider he had not picked up steroids since he did not feel comfortable taking without azithromycin.  Patient was encouraged to .  Has Medrol Dosepak on him currently.  Only a couple doses missing.  Reports he intermittently took but again did not feel comfortable taking without azithromycin.  Hyperfocused on antibiotic.     Denies current tobacco use.  Reports he used for a couple years in his late teens/early 20s.  Per patient current residence has mold/other allergens that he believes are prompting his breathing issues.     Known skin mass on right shoulder present for years.  Patient denies that it has recently worsened.  Encouraged him to follow-up with PCP and surgery once discharged.     Review of Systems   Constitutional:  Negative for fatigue and fever.   HENT:  Negative for sore throat.    Respiratory:  Positive for cough, chest tightness and shortness of breath.    Cardiovascular:  Negative for chest pain.   Gastrointestinal:  Negative for abdominal distention, abdominal pain, diarrhea, nausea and vomiting.   Genitourinary:  Negative for difficulty urinating.   Musculoskeletal:  Negative for arthralgias.   Neurological:  Negative for weakness and headaches.   Psychiatric/Behavioral:  Negative for agitation and behavioral problems.    All other systems reviewed and are negative.      Historical Information   Past Medical History:   Diagnosis Date    Deafness " in right ear     Hypertension      Past Surgical History:   Procedure Laterality Date    SPLENECTOMY, TOTAL       Social History     Tobacco Use    Smoking status: Former     Current packs/day: 0.00     Types: Cigarettes     Quit date: 1987     Years since quittin.5    Smokeless tobacco: Never   Vaping Use    Vaping status: Never Used   Substance and Sexual Activity    Alcohol use: Not Currently    Drug use: Not Currently    Sexual activity: Not Currently     E-Cigarette/Vaping    E-Cigarette Use Never User      E-Cigarette/Vaping Substances    Nicotine No     THC No     CBD No     Flavoring No     Other No     Unknown No      No family history on file.  Social History:  Marital Status: Single   Occupation: Unknown  Patient Pre-hospital Living Situation: Home  Patient Pre-hospital Level of Mobility: walks  Patient Pre-hospital Diet Restrictions: Regular    Meds/Allergies   I have reviewed home medications with patient personally.  Prior to Admission medications    Medication Sig Start Date End Date Taking? Authorizing Provider   azithromycin (ZITHROMAX) 250 mg tablet Take 2 tablets (500 mg total) by mouth daily for 4 days 25 Yes Geoffrey Llanes PA-C   metroNIDAZOLE (METROCREAM) 0.75 % cream Apply 1 Application topically 2 (two) times a day 24  Yes Dianne Louise MD   predniSONE 20 mg tablet Take 3 tablets (60 mg total) by mouth daily for 4 days 25 Yes Geoffrey Llanes PA-C   albuterol (Proventil HFA) 90 mcg/act inhaler Inhale 1-2 puffs every 4 (four) hours as needed for wheezing or shortness of breath 3/18/25   Brisa Brito MD   benzonatate (TESSALON PERLES) 100 mg capsule Take 1 capsule (100 mg total) by mouth every 8 (eight) hours  Patient not taking: Reported on 2025   Darryn Simmons DO   Fluticasone Furoate-Vilanterol 100-25 mcg/actuation inhaler Inhale 1 puff daily Rinse mouth after use.  Patient not taking: Reported on 2025    Dianne Louise MD   guaiFENesin (MUCINEX) 600 mg 12 hr tablet Take 1 tablet (600 mg total) by mouth 2 (two) times a day  Patient not taking: Reported on 4/9/2025 6/20/24   Dianne Louise MD     No Known Allergies    Objective :  Temp:  [97.2 °F (36.2 °C)-98.2 °F (36.8 °C)] 97.2 °F (36.2 °C)  HR:  [81-86] 81  BP: (149-170)/(85) 149/85  Resp:  [18-22] 18  SpO2:  [99 %] 99 %  O2 Device: None (Room air)    Physical Exam  Vitals and nursing note reviewed.   Constitutional:       Appearance: Normal appearance.   HENT:      Head: Normocephalic.   Eyes:      Extraocular Movements: Extraocular movements intact.      Pupils: Pupils are equal, round, and reactive to light.   Cardiovascular:      Rate and Rhythm: Normal rate and regular rhythm.      Heart sounds: No murmur heard.     No gallop.   Pulmonary:      Effort: No respiratory distress.      Breath sounds: Wheezing (mild) present.   Abdominal:      General: Bowel sounds are normal. There is no distension.      Tenderness: There is no abdominal tenderness.   Musculoskeletal:         General: Normal range of motion.      Cervical back: Normal range of motion.      Right lower leg: No edema.      Left lower leg: No edema.   Skin:     General: Skin is warm.      Comments: Large nontender mass of right shoulder   Neurological:      General: No focal deficit present.      Mental Status: He is alert and oriented to person, place, and time. Mental status is at baseline.   Psychiatric:         Mood and Affect: Mood normal.         Behavior: Behavior normal.         Thought Content: Thought content normal.          Lines/Drains:            Lab Results: I have reviewed the following results:  Results from last 7 days   Lab Units 04/08/25  2239   WBC Thousand/uL 12.56*   HEMOGLOBIN g/dL 15.2   HEMATOCRIT % 45.7   PLATELETS Thousands/uL 361   SEGS PCT % 64   LYMPHO PCT % 19   MONO PCT % 11   EOS PCT % 5     Results from last 7 days   Lab Units 04/08/25  2239   SODIUM  "mmol/L 137   POTASSIUM mmol/L 3.5   CHLORIDE mmol/L 99   CO2 mmol/L 30   BUN mg/dL 19   CREATININE mg/dL 0.94   ANION GAP mmol/L 8   CALCIUM mg/dL 9.3   GLUCOSE RANDOM mg/dL 114             No results found for: \"HGBA1C\"        Imaging Results Review: No pertinent imaging studies reviewed.  Other Study Results Review: EKG was reviewed.     Administrative Statements   I have spent a total time of 50 minutes in caring for this patient on the day of the visit/encounter including Risks and benefits of tx options, Instructions for management, Counseling / Coordination of care, and Documenting in the medical record.    ** Please Note: This note has been constructed using a voice recognition system. **    "

## 2025-04-09 NOTE — UTILIZATION REVIEW
INPATIENT Stay Review    Admission: Date/Time/Statement:  4/9/25 0032 observation with start of care on 4/8/25 in ED AND CHANGED 4/9/25 0946 INPATIENT RE: PATIENT NEEDS > 2 MIDNIGHT STAY DUE TO CONTINUED SHORTNESS OF BREATH WITH SUSPECTED COPD EXACERBATION AND NEED FOR IV STEROIDS AND SCHEDULED NEBS.    Admission Orders (From admission, onward)       Ordered        04/09/25 0946  INPATIENT ADMISSION  Once            04/09/25 0032  Place in Observation  Once                           Orders Placed This Encounter   Procedures    INPATIENT ADMISSION     Standing Status:   Standing     Number of Occurrences:   1     Level of Care:   Med Surg [16]     Estimated length of stay:   More than 2 Midnights     Certification:   I certify that inpatient services are medically necessary for this patient for a duration of greater than two midnights. See H&P and MD Progress Notes for additional information about the patient's course of treatment.      Date: 4/9/25                           Current Patient Class: Inpatient  Current Level of Care: Med Surg    HPI:64 y.o. male initially admitted on 4/8/25 to ED and observation CONVERTED TO INPATIENT order placed 4/9/25 due to  shortness of breath  Presented to ED due to increasing shortness of breath and wheezing starting 3 days prior to arrival.  Seen in ED on  3/18 fro same and prescribed medrol dose pack,  Seen again 3/22 for same and had not taken.  Additionally prescribed azithromycin that time.   Has only intermittently taken prescribed mediations.  In the ED treated with Duo Neb, Pinon Neb and prednisone  Has known skin mass on his right shoulder present for years.  Not on OP medications for schizophrenia.    Current Diagnosis: shortness of breath/Skin Tumor/schizophrenia not on medications.     Assessment/Plan:  INPATIENT ON 4/9/25:     4/10/2025 INPATIENT  .  Patient presents with  shortness of breath.  Resides in a room with dust and mild and thinks shortness of breath  related to this and not lung disease.   Feels better after nebs today.    On exam: Breath Sounds: Diminished, Expiratory wheezes, Inspiratory wheezes  Cough: Non-productive  Abnormal labs or imaging:  K 3.3.  glucose 181.  Wbc 11.49  Diagnosis/Plan    Shortness of Breath/Skin Tumor/schizophrenia not on medications.    Consult Pulmonary.  Continue azithromycin, scheduled nebs, solu medrol.     4/9/25 per Pulmonary - Shortness and breath and suspect related to uncontrolled COPD vs underlying reactive airway symptoms. Non complaint with Breo at home.    Recommend azithromycin.  Continue atrovent/xopenex nebs TID. Continue IV Solu Medrol and possible transition to prednisone tomorrow.      4/10/25 inpatient:  does not want to leave until skin tumor taken care of.   On exam: mucous membranes dry.  Lungs overall clear.  Wbc 15.31.  glucose 193     Dx:  asthma with acute exacerbation/Skin tumor/Schizophrenia.   Plan: started on Breo.  Continue scheduled nebs, Prednisone, azithromycin     Medications:   Scheduled Medications:  azithromycin, 500 mg, Oral, Q24H [facility] - dc 0731 on 4/10/25  guaiFENesin, 600 mg, Oral, BID  heparin (porcine), 5,000 Units, Subcutaneous, Q8H LEXI  ipratropium, 0.5 mg, Nebulization, TID  levalbuterol, 1.25 mg, Nebulization, TID   And  sodium chloride, 3 mL, Nebulization, TID - dc 0732 on 4/10/25  loratadine, 10 mg, Oral, Daily  methylPREDNISolone sodium succinate, 40 mg, Intravenous, Q12H [facility] - dc 1054 on 4/9  predniSONE tablet 40 mg  Dose: 40 mg  Freq: Daily Route: PO    Continuous IV Infusions:     PRN Meds: not used.   acetaminophen, 650 mg, Oral, Q6H PRN  albuterol, 1 puff, Inhalation, Q4H PRN  benzonatate, 100 mg, Oral, TID PRN      Discharge Plan:  to be determined.     Vital Signs (last 3 days)       Date/Time Temp Pulse Resp BP MAP (mmHg) SpO2 O2 Device Patient Position - Orthostatic VS Lynn Coma Scale Score Pain    04/10/25 0900 -- -- -- -- -- -- -- -- 15 No Pain    04/10/25 0831 -- -- --  -- -- 96 % None (Room air) -- -- --    04/10/25 08:24:01 98.7 °F (37.1 °C) 85 16 138/54 82 93 % None (Room air) Sitting -- --    04/09/25 22:17:19 -- 73 16 142/89 107 91 % -- -- -- --    04/09/25 1945 -- -- -- -- -- -- None (Room air) -- 15 No Pain    04/09/25 1918 -- -- -- -- -- 94 % -- -- -- --    04/09/25 14:17:25 -- 89 18 148/94 112 96 % -- -- -- --    04/09/25 1100 97.8 °F (36.6 °C) -- -- -- -- -- -- -- -- --    04/09/25 0908 -- -- -- -- -- -- -- -- 15 No Pain    04/09/25 07:28:30 -- 78 18 149/85 106 96 % -- -- -- --    04/09/25 0725 -- -- -- -- -- 93 % -- -- -- --    04/09/25 0232 -- -- -- -- -- -- -- -- -- No Pain    04/09/25 0154 -- -- -- -- -- -- None (Room air) -- 15 No Pain    04/09/25 01:24:10 97.2 °F (36.2 °C) 81 18 149/85 106 99 % -- -- -- --    04/09/25 0026 -- -- -- -- -- -- None (Room air) -- -- --    04/08/25 2300 -- -- -- -- -- -- -- -- 15 --    04/08/25 2102 -- -- -- -- -- -- -- -- 15 No Pain    04/08/25 2025 98.2 °F (36.8 °C) 86 22 170/85 -- 99 % None (Room air) Sitting -- 6          Weight (last 2 days)       Date/Time Weight    04/09/25 0232 86.3 (190.2)    04/08/25 2025 87.5 (192.9)            Pertinent Labs/Diagnostic Results:   Radiology:  XR chest 2 views   ED Interpretation by Geoffrey Llanes PA-C (04/08 2252)   No acute cardiopulmonary disease on initial read by me      Final Interpretation by Clement Hudson MD (04/09 0904)      No acute cardiopulmonary disease.            Resident: Oneil Maxwell I, the attending radiologist, have reviewed the images and agree with the final report above.      Workstation performed: [id number]           Cardiology:  No orders to display     GI:  No orders to display       Results from last 7 days   Lab Units 04/08/25  2239   SARS-COV-2  Negative     Results from last 7 days   Lab Units 04/10/25  0447 04/09/25  0432 04/08/25  2239   WBC Thousand/uL 15.31* 11.49* 12.56*   HEMOGLOBIN g/dL 14.5 13.8 15.2   HEMATOCRIT % 42.6 41.6 45.7   PLATELETS  Thousands/uL 326 320 361   TOTAL NEUT ABS Thousands/µL  --  9.95* 8.07*     Results from last 7 days   Lab Units 04/10/25  0447 04/09/25  0432 04/08/25  2239   SODIUM mmol/L 139 138 137   POTASSIUM mmol/L 4.7 3.3* 3.5   CHLORIDE mmol/L 103 102 99   CO2 mmol/L 26 27 30   ANION GAP mmol/L 10 9 8   BUN mg/dL 13 14 19   CREATININE mg/dL 0.70 0.76 0.94   EGFR ml/min/1.73sq m 99 95 84   CALCIUM mg/dL 9.3 8.6 9.3     Results from last 7 days   Lab Units 04/10/25  0447 04/09/25  0432 04/08/25  2239   GLUCOSE RANDOM mg/dL 193* 181* 114     Results from last 7 days   Lab Units 04/08/25  2239   PROCALCITONIN ng/ml 0.06     Results from last 7 days   Lab Units 04/08/25  2239   INFLUENZA A PCR  Negative   INFLUENZA B PCR  Negative   RSV PCR  Negative     Network Utilization Review Department  ATTENTION: Please call with any questions or concerns to [phone] and carefully listen to the prompts so that you are directed to the right person. All voicemails are confidential.   For Discharge needs, contact Care Management DC Support Team at [phone] opt. 2  Send all requests for admission clinical reviews, approved or denied determinations and any other requests to dedicated fax number below belonging to the campus where the patient is receiving treatment. List of dedicated fax numbers for the Facilities:  FACILITY NAME UR FAX NUMBER   ADMISSION DENIALS (Administrative/Medical Necessity) [phone]   DISCHARGE SUPPORT TEAM (NETWORK) [phone]   PARENT CHILD HEALTH (Maternity/NICU/Pediatrics) [phone]   [facility] [phone]   [facility] [phone]   [facility] [phone]   General acute hospital [phone]   [facility] [phone]   [facility] [phone]   [facility] [phone]   [facility]  [phone]   [facility] [phone]   [facility] [phone]   [facility] [phone]   [facility] [phone]

## 2025-04-09 NOTE — ED CARE HANDOFF
Emergency Department Sign Out Note        Sign out and transfer of care from Geoffrey Hutchins PA-C. See Separate Emergency Department note.     The patient, Albert Jensen, was evaluated by the previous provider for shortness of breath.        No data recorded                          ED Course as of 04/09/25 0003   Tue Apr 08, 2025   2230 Re-eval for COPD exacerbation   Wed Apr 09, 2025   0001 Patient received in signout from Geoffrey Hutchins PA-C, here with worsening of his chronic shortness of breath, had wheezing however was in no acute respiratory distress saturating 99% on room air, no focal findings on chest x-ray low concern for pneumonia, patient with frequent exacerbations of his lung disease, recommend following up with pulmonology otherwise no indication for further inpatient evaluation or treatment at this point stable for discharge     Procedures  Medical Decision Making  Amount and/or Complexity of Data Reviewed  Labs: ordered.  Radiology: ordered and independent interpretation performed.    Risk  Prescription drug management.            Disposition  Final diagnoses:   Wheezing   COPD with acute exacerbation (HCC)     Time reflects when diagnosis was documented in both MDM as applicable and the Disposition within this note       Time User Action Codes Description Comment    4/8/2025 10:24 PM Geoffrey Llanes [J98.01] Bronchospasm     4/8/2025 10:24 PM Geoffrey Llanes [R06.2] Wheezing     4/8/2025 10:54 PM Geoffrey Llanes Modify [R06.2] Wheezing     4/8/2025 10:54 PM Geoffrey Llanes Remove [J98.01] Bronchospasm     4/8/2025 10:54 PM Geoffrey Llanes Add [J44.1] COPD with acute exacerbation (HCC)           ED Disposition       ED Disposition   Discharge    Condition   Stable    Date/Time   Wed Apr 9, 2025 12:01 AM    Comment   Albert Jensen discharge to home/self care.                   Follow-up Information       Follow up With Specialties Details Why Contact Info Additional Information    Renzo Grullon MD Internal  [address] [address]  [phone]        [facility] Emergency Department Emergency Medicine   [address] [facility] [address] [phone] [facility] Emergency Department, [address] [place], [place] [address]    [facility] Pulmonary [facility] Pulmonology Schedule an appointment as soon as possible for a visit   [address]  [address] [place] [id number] [phone] [facility] Pulmonary [facility], [facility] [address], [address] [place] [address], [place] [address] [phone]  For all appointments, please enter through the Cancer Center entrance. Looking at the front of the hospital off of [address], this is located to the far left.           Patient's Medications   Discharge Prescriptions    AZITHROMYCIN (ZITHROMAX) 250 MG TABLET    Take 2 tablets (500 mg total) by mouth daily for 4 days       Start Date: 4/8/2025  End Date: 4/12/2025       Order Dose: 500 mg       Quantity: 8 tablet    Refills: 0    PREDNISONE 20 MG TABLET    Take 3 tablets (60 mg total) by mouth daily for 4 days       Start Date: 4/8/2025  End Date: 4/12/2025       Order Dose: 60 mg       Quantity: 12 tablet    Refills: 0     No discharge procedures on file.       ED Provider  Electronically Signed by     Chelsie Mena DO  04/09/25 0003

## 2025-04-09 NOTE — QUICK NOTE
Attempted to call patient's sister per his request however she did not answer.  Left a voice message.

## 2025-04-09 NOTE — CONSULTS
"Consultation - Pulmonology   Name: Albert Jensen 64 y.o. male I MRN: [id number]  Unit/Bed#: -01 I Date of Admission: 4/8/2025   Date of Service: 4/9/2025 I Hospital Day: 0   Inpatient consult to Pulmonology  Consult performed by: BRENTON Felix  Consult ordered by: Juany Pineda PA-C        Physician Requesting Evaluation: Dianne Louise MD   Reason for Evaluation / Principal Problem: shortness of breath    Assessment & Plan  Shortness of breath  Patient with multiple ER visits over the past several months, with suspected acute COPD exacerbations. Suspect shortness of breath related to uncontrolled COPD vs underlying reactive airway symptoms   CXR is unremarkable  WBC slightly elevated (11.49, 12.56) likely in setting of steroid use. Procal negative. Flu/RSV/Covid negative  Home regimen: Breo 100 but is noncompliant, Albuterol  Azithromycin for 3 day course  Continue atrovent/xopenex nebs TID  Continue IV solumedrol 40mg q12h. Can decrease to prednisone tomorrow  He does have noted trending elevated eosinophils (220, 570, 370) he could have underlying reactive airway disease with exacerbation secondary to environmental triggers/allergens.   Recommend OP pulmonary follow up with PFTs and allergy workup  Schizophrenia (HCC)    Skin tumor    Pulmonology service will follow.    History of Present Illness   Albert Jensen is a 64 y.o. male with PMH schizophrenia, who presents with shortness of breath. He has had multiple ER visits over the past several months with complaints of shortness of breath, seeking steroid taper and azithromycin. Patient states he came to ER steroids and azithromycin, however was only given steroids, so he returned in hopes to \"get the azithromycin filled\".     He currently has no respiratory complaints. He denies any chest pains, shortness of breath or cough. States he did have wheezing when he presented to the ER but none on exam today. He has a distant smoking history, he quit " 37 years ago. He is noncompliant with home inhalers. He does complain of having a lot of black mold in his current home, and feels that is causing his breathing issues.     He has never seen a pulmonologist, was referred for hospital follow up last year but did not show up to his appointment. No PFTs on chart.     Review of Systems   Constitutional:  Negative for chills and fever.   HENT:  Positive for rhinorrhea. Negative for ear pain and sore throat.    Eyes:  Negative for pain.   Respiratory:  Positive for wheezing. Negative for cough and shortness of breath.    Cardiovascular:  Negative for chest pain and palpitations.   Gastrointestinal:  Negative for abdominal pain and vomiting.   Musculoskeletal:  Positive for joint swelling (R shoulder - lipoma per patient). Negative for arthralgias and back pain.   Skin:  Negative for color change and rash.   Neurological:  Negative for syncope and light-headedness.   All other systems reviewed and are negative.      Historical Information   Medical History Review: I have reviewed the patient's PMH, PSH, Social History, Family History, Meds, and Allergies   Historical Information   Past Medical History:   Diagnosis Date    Deafness in right ear     Hypertension      Past Surgical History:   Procedure Laterality Date    SPLENECTOMY, TOTAL       Social History     Tobacco Use    Smoking status: Former     Current packs/day: 0.00     Types: Cigarettes     Quit date: 1987     Years since quittin.5    Smokeless tobacco: Never   Vaping Use    Vaping status: Never Used   Substance and Sexual Activity    Alcohol use: Not Currently    Drug use: Not Currently    Sexual activity: Not Currently     E-Cigarette/Vaping    E-Cigarette Use Never User      E-Cigarette/Vaping Substances    Nicotine No     THC No     CBD No     Flavoring No     Other No     Unknown No      No family history on file.  Social History     Tobacco Use    Smoking status: Former     Current packs/day:  0.00     Types: Cigarettes     Quit date: 1987     Years since quittin.5    Smokeless tobacco: Never   Vaping Use    Vaping status: Never Used   Substance and Sexual Activity    Alcohol use: Not Currently    Drug use: Not Currently    Sexual activity: Not Currently       Current Facility-Administered Medications:     acetaminophen (TYLENOL) tablet 650 mg, Q6H PRN    albuterol (PROVENTIL HFA,VENTOLIN HFA) inhaler 1 puff, Q4H PRN    azithromycin (ZITHROMAX) tablet 500 mg, Q24H LEXI    benzonatate (TESSALON PERLES) capsule 100 mg, TID PRN    guaiFENesin (MUCINEX) 12 hr tablet 600 mg, BID    heparin (porcine) subcutaneous injection 5,000 Units, Q8H LEXI    ipratropium (ATROVENT) 0.02 % inhalation solution 0.5 mg, TID    levalbuterol (XOPENEX) inhalation solution 1.25 mg, TID **AND** sodium chloride 0.9 % inhalation solution 3 mL, TID    loratadine (CLARITIN) tablet 10 mg, Daily    methylPREDNISolone sodium succinate (Solu-MEDROL) injection 40 mg, Q12H LEXI    potassium chloride (Klor-Con M20) CR tablet 40 mEq, Once  Patient has no known allergies.      Objective :  Temp:  [97.2 °F (36.2 °C)-98.2 °F (36.8 °C)] 97.2 °F (36.2 °C)  HR:  [78-86] 78  BP: (149-170)/(85) 149/85  Resp:  [18-22] 18  SpO2:  [93 %-99 %] 96 %  O2 Device: None (Room air)    Physical Exam  Vitals and nursing note reviewed.   Constitutional:       General: He is not in acute distress.     Appearance: He is well-developed.   HENT:      Head: Normocephalic and atraumatic.      Nose: Nose normal.   Eyes:      Conjunctiva/sclera: Conjunctivae normal.   Cardiovascular:      Rate and Rhythm: Normal rate and regular rhythm.   Pulmonary:      Effort: Pulmonary effort is normal. No respiratory distress.      Breath sounds: Normal breath sounds. No wheezing or rhonchi.   Abdominal:      General: Abdomen is flat.   Musculoskeletal:         General: No swelling.      Cervical back: Normal range of motion and neck supple.   Skin:     General: Skin is warm  and dry.      Capillary Refill: Capillary refill takes less than 2 seconds.   Neurological:      General: No focal deficit present.      Mental Status: He is alert and oriented to person, place, and time.   Psychiatric:         Mood and Affect: Mood normal.           Lab Results: I have reviewed the following results:  .     04/09/25  0432   WBC 11.49*   HGB 13.8   HCT 41.6      SODIUM 138   K 3.3*      CO2 27   BUN 14   CREATININE 0.76   GLUC 181*     ABG: No new results in last 24 hours.    Imaging Results Review: I reviewed radiology reports from this admission including: chest xray.  Other Study Results Review: No additional pertinent studies reviewed.  PFT Results Reviewed: N/A      Stacia Werner, THELMA RN FNP-BC  Nurse Practitioner  [facility]

## 2025-04-09 NOTE — ED PROVIDER NOTES
Time reflects when diagnosis was documented in both MDM as applicable and the Disposition within this note       Time User Action Codes Description Comment    4/8/2025 10:24 PM Geoffrey Llanes Add [J98.01] Bronchospasm     4/8/2025 10:24 PM Geoffrey Llanes Add [R06.2] Wheezing     4/8/2025 10:54 PM Geoffrey Llanes Modify [R06.2] Wheezing     4/8/2025 10:54 PM Geoffrey Llanes Remove [J98.01] Bronchospasm     4/8/2025 10:54 PM Geoffrey Llanes Add [J44.1] COPD with acute exacerbation (HCC)           ED Disposition       None          Assessment & Plan       Medical Decision Making  Patient is a 64-year-old male with a history of COPD exacerbation, hypertension, surgical history of total splenectomy that presents to the emerged from with intermittent wheezing for the past 3 days.   Patient hemodynamically stable and afebrile  Patient with wheezing all lung fields expiratory and expiratory  DuoNeb x 2, p.o. prednisone, magnesium, IVF NSS  Chest x-ray with no acute cardiopulmonary disease on wet read  Covid/Flu/RSV in process  Patient leukocytosis of 12.56, no bandemia  BMP in process  Signout to Dr. Mena, pending BMP, and patient reassessment of wheezing symptomatology; COPD exacerbation      Amount and/or Complexity of Data Reviewed  Labs: ordered. Decision-making details documented in ED Course.  Radiology: ordered and independent interpretation performed. Decision-making details documented in ED Course.    Risk  Prescription drug management.        ED Course as of 04/08/25 2256   Tue Apr 08, 2025 2230 Sign out to Dr. Mena pending blood labs, and cxr       Medications   ipratropium-albuterol (DUO-NEB) 0.5-2.5 mg/3 mL inhalation solution 3 mL (has no administration in time range)   magnesium sulfate 2 g/50 mL IVPB (premix) 2 g (2 g Intravenous New Bag 4/8/25 2242)   sodium chloride 0.9 % bolus 500 mL (500 mL Intravenous New Bag 4/8/25 2241)   ipratropium-albuterol (DUO-NEB) 0.5-2.5 mg/3 mL inhalation solution 3 mL (3 mL  Nebulization Given 4/8/25 2230)   predniSONE tablet 60 mg (60 mg Oral Given 4/8/25 2230)       ED Risk Strat Scores                    No data recorded        SBIRT 20yo+      Flowsheet Row Most Recent Value   Initial Alcohol Screen: US AUDIT-C     1. How often do you have a drink containing alcohol? 0 Filed at: 04/08/2025 2101   2. How many drinks containing alcohol do you have on a typical day you are drinking?  0 Filed at: 04/08/2025 2101   3a. Male UNDER 65: How often do you have five or more drinks on one occasion? 0 Filed at: 04/08/2025 2101   3b. FEMALE Any Age, or MALE 65+: How often do you have 4 or more drinks on one occassion? 0 Filed at: 04/08/2025 2101   Audit-C Score 0 Filed at: 04/08/2025 2101   ANJELICA: How many times in the past year have you...    Used an illegal drug or used a prescription medication for non-medical reasons? Never Filed at: 04/08/2025 2101                            History of Present Illness       Chief Complaint   Patient presents with    Medical Problem     Patient is a 64-year-old male with a history of COPD exacerbation, hypertension, surgical history of total splenectomy that presents to the emerged from with intermittent wheezing for the past 3 days.  Patient denies associated symptoms.  Patient denies recent antibiotic use.  Patient with recent evaluation in the Emergency Department on 3/22/2025 and treated for a COPD exacerbation.  Patient denies palliative factors with provocative factors of activity.  Patient denies noneffective treatment.  Patient denies fevers, chills, nausea, vomiting, diarrhea, constipation, neurosymptoms.  Patient denies recent fall recent trauma.  Patient denies leg swelling.  Patient denies recent weight gain.  Patient is unaware of possible sick contacts.  Patient denies chest pain and abdominal pain.      Past Medical History:   Diagnosis Date    Deafness in right ear     Hypertension       Past Surgical History:   Procedure Laterality Date     SPLENECTOMY, TOTAL        No family history on file.   Social History     Tobacco Use    Smoking status: Former     Current packs/day: 0.00     Types: Cigarettes     Quit date: 1987     Years since quittin.5    Smokeless tobacco: Never   Vaping Use    Vaping status: Never Used   Substance Use Topics    Alcohol use: Not Currently    Drug use: Not Currently      E-Cigarette/Vaping    E-Cigarette Use Never User       E-Cigarette/Vaping Substances    Nicotine No     THC No     CBD No     Flavoring No     Other No     Unknown No       I have reviewed and agree with the history as documented.       History provided by:  Patient   used: No    Medical Problem  Associated symptoms: cough and wheezing    Associated symptoms: no abdominal pain, no chest pain, no congestion, no diarrhea, no ear pain, no fever, no headaches, no nausea, no rash, no rhinorrhea, no shortness of breath, no sore throat and no vomiting        Review of Systems   Constitutional:  Negative for activity change, appetite change, chills and fever.   HENT:  Negative for congestion, ear pain, postnasal drip, rhinorrhea, sinus pressure, sinus pain, sore throat and tinnitus.    Eyes:  Negative for photophobia, pain and visual disturbance.   Respiratory:  Positive for cough and wheezing. Negative for chest tightness and shortness of breath.    Cardiovascular:  Negative for chest pain and palpitations.   Gastrointestinal:  Negative for abdominal pain, constipation, diarrhea, nausea and vomiting.   Genitourinary:  Negative for difficulty urinating, dysuria, flank pain, frequency, hematuria and urgency.   Musculoskeletal:  Negative for arthralgias, back pain, gait problem, neck pain and neck stiffness.   Skin:  Negative for color change, pallor and rash.   Allergic/Immunologic: Negative for environmental allergies and food allergies.   Neurological:  Negative for dizziness, seizures, syncope, weakness, numbness and headaches.  "  Psychiatric/Behavioral:  Negative for confusion.    All other systems reviewed and are negative.          Objective       ED Triage Vitals [04/08/25 2025]   Temperature Pulse Blood Pressure Respirations SpO2 Patient Position - Orthostatic VS   98.2 °F (36.8 °C) 86 170/85 22 99 % Sitting      Temp Source Heart Rate Source BP Location FiO2 (%) Pain Score    Oral -- Left arm -- 6      Vitals      Date and Time Temp Pulse SpO2 Resp BP Pain Score FACES Pain Rating User   04/08/25 2102 -- -- -- -- -- No Pain -- EW   04/08/25 2025 98.2 °F (36.8 °C) 86 99 % 22 170/85 6 -- WM            Physical Exam  Vitals and nursing note reviewed.   Constitutional:       General: He is awake.      Appearance: Normal appearance. He is well-developed and normal weight. He is not ill-appearing, toxic-appearing or diaphoretic.      Comments: /85 (BP Location: Left arm)   Pulse 86   Temp 98.2 °F (36.8 °C) (Oral)   Resp 22   Ht 5' 7\" (1.702 m)   Wt 87.5 kg (192 lb 14.4 oz)   SpO2 99%   BMI 30.21 kg/m²      HENT:      Head: Normocephalic and atraumatic.      Jaw: There is normal jaw occlusion.      Right Ear: Hearing, tympanic membrane and external ear normal. No decreased hearing noted. No drainage, swelling or tenderness. No mastoid tenderness.      Left Ear: Hearing, tympanic membrane and external ear normal. No decreased hearing noted. No drainage, swelling or tenderness. No mastoid tenderness.      Nose: Nose normal.      Mouth/Throat:      Lips: Pink.      Mouth: Mucous membranes are moist.      Pharynx: Oropharynx is clear. Uvula midline.   Eyes:      General: Lids are normal. Vision grossly intact. Gaze aligned appropriately.         Right eye: No discharge.         Left eye: No discharge.      Extraocular Movements: Extraocular movements intact.      Conjunctiva/sclera: Conjunctivae normal.      Pupils: Pupils are equal, round, and reactive to light.   Neck:      Vascular: No JVD.      Trachea: Trachea and phonation " normal. No tracheal tenderness or tracheal deviation.   Cardiovascular:      Rate and Rhythm: Normal rate and regular rhythm.      Pulses: Normal pulses.           Radial pulses are 2+ on the right side and 2+ on the left side.        Posterior tibial pulses are 2+ on the right side and 2+ on the left side.      Heart sounds: Normal heart sounds.   Pulmonary:      Effort: Pulmonary effort is normal.      Breath sounds: Normal air entry. No stridor. Examination of the right-upper field reveals wheezing. Examination of the left-upper field reveals wheezing. Examination of the right-middle field reveals wheezing. Examination of the left-middle field reveals wheezing. Examination of the right-lower field reveals wheezing. Examination of the left-lower field reveals wheezing. Wheezing present. No decreased breath sounds, rhonchi or rales.   Chest:      Chest wall: No tenderness.   Abdominal:      General: Abdomen is flat. Bowel sounds are normal. There is no distension.      Palpations: Abdomen is soft. Abdomen is not rigid.      Tenderness: There is no abdominal tenderness. There is no guarding or rebound.   Musculoskeletal:         General: Normal range of motion.      Cervical back: Full passive range of motion without pain, normal range of motion and neck supple. No rigidity. No spinous process tenderness or muscular tenderness. Normal range of motion.      Right lower leg: Normal.      Left lower leg: Normal.      Comments: Euvolemic   Feet:      Right foot:      Toenail Condition: Right toenails are normal.      Left foot:      Toenail Condition: Left toenails are normal.   Lymphadenopathy:      Head:      Right side of head: No submental, submandibular, tonsillar, preauricular, posterior auricular or occipital adenopathy.      Left side of head: No submental, submandibular, tonsillar, preauricular, posterior auricular or occipital adenopathy.      Cervical: No cervical adenopathy.      Right cervical: No  superficial, deep or posterior cervical adenopathy.     Left cervical: No superficial, deep or posterior cervical adenopathy.   Skin:     General: Skin is warm.      Capillary Refill: Capillary refill takes less than 2 seconds.      Findings: No rash.   Neurological:      General: No focal deficit present.      Mental Status: He is alert and oriented to person, place, and time. Mental status is at baseline.      GCS: GCS eye subscore is 4. GCS verbal subscore is 5. GCS motor subscore is 6.      Sensory: No sensory deficit.   Psychiatric:         Attention and Perception: Attention normal.         Mood and Affect: Mood normal.         Speech: Speech normal.         Behavior: Behavior normal. Behavior is cooperative.         Thought Content: Thought content normal.         Judgment: Judgment normal.         Results Reviewed       Procedure Component Value Units Date/Time    CBC and differential [[id number]]  (Abnormal) Collected: 04/08/25 2239    Lab Status: Final result Specimen: Blood from Arm, Left Updated: 04/08/25 2250     WBC 12.56 Thousand/uL      RBC 4.83 Million/uL      Hemoglobin 15.2 g/dL      Hematocrit 45.7 %      MCV 95 fL      MCH 31.5 pg      MCHC 33.3 g/dL      RDW 13.0 %      MPV 10.1 fL      Platelets 361 Thousands/uL      nRBC 0 /100 WBCs      Segmented % 64 %      Immature Grans % 0 %      Lymphocytes % 19 %      Monocytes % 11 %      Eosinophils Relative 5 %      Basophils Relative 1 %      Absolute Neutrophils 8.07 Thousands/µL      Absolute Immature Grans 0.04 Thousand/uL      Absolute Lymphocytes 2.38 Thousands/µL      Absolute Monocytes 1.42 Thousand/µL      Eosinophils Absolute 0.57 Thousand/µL      Basophils Absolute 0.08 Thousands/µL     Basic metabolic panel [[id number]] Collected: 04/08/25 2239    Lab Status: In process Specimen: Blood from Arm, Left Updated: 04/08/25 2247    FLU/RSV/COVID - if FLU/RSV clinically relevant (2hr TAT) [[id number]] Collected: 04/08/25 2239    Lab Status: In  process Specimen: Nares from Nose Updated: 04/08/25 2247            XR chest 2 views   ED Interpretation by Geoffrey Llanes PA-C (04/08 2252)   No acute cardiopulmonary disease on initial read by me          Procedures    ED Medication and Procedure Management   Prior to Admission Medications   Prescriptions Last Dose Informant Patient Reported? Taking?   Fluticasone Furoate-Vilanterol 100-25 mcg/actuation inhaler   No No   Sig: Inhale 1 puff daily Rinse mouth after use.   Patient not taking: Reported on 7/15/2024   albuterol (Proventil HFA) 90 mcg/act inhaler   No No   Sig: Inhale 1-2 puffs every 4 (four) hours as needed for wheezing or shortness of breath   benzonatate (TESSALON PERLES) 100 mg capsule   No No   Sig: Take 1 capsule (100 mg total) by mouth every 8 (eight) hours   Patient not taking: Reported on 7/15/2024   guaiFENesin (MUCINEX) 600 mg 12 hr tablet   No No   Sig: Take 1 tablet (600 mg total) by mouth 2 (two) times a day   Patient not taking: Reported on 7/15/2024   metroNIDAZOLE (METROCREAM) 0.75 % cream   No No   Sig: Apply 1 Application topically 2 (two) times a day      Facility-Administered Medications: None     Patient's Medications   Discharge Prescriptions    AZITHROMYCIN (ZITHROMAX) 250 MG TABLET    Take 2 tablets (500 mg total) by mouth daily for 4 days       Start Date: 4/8/2025  End Date: 4/12/2025       Order Dose: 500 mg       Quantity: 8 tablet    Refills: 0    PREDNISONE 20 MG TABLET    Take 3 tablets (60 mg total) by mouth daily for 4 days       Start Date: 4/8/2025  End Date: 4/12/2025       Order Dose: 60 mg       Quantity: 12 tablet    Refills: 0     No discharge procedures on file.  ED SEPSIS DOCUMENTATION   Time reflects when diagnosis was documented in both MDM as applicable and the Disposition within this note       Time User Action Codes Description Comment    4/8/2025 10:24 PM Geoffrey Llanes [J98.01] Bronchospasm     4/8/2025 10:24 PM Geoffrey Llanes [R06.2] Wheezing      4/8/2025 10:54 PM Geoffrey Llanes Modify [R06.2] Wheezing     4/8/2025 10:54 PM Geoffrey Llanes Remove [J98.01] Bronchospasm     4/8/2025 10:54 PM Geoffrey Llanes Add [J44.1] COPD with acute exacerbation (HCC)                  Geoffrey Llanes PA-C  04/08/25 1787

## 2025-04-09 NOTE — ASSESSMENT & PLAN NOTE
Presents due to worsening shortness of breath associated with wheezing over the past 3 days  Seen in ER on 3/18. Prescribed medrol dose lyndon. Patient seen again on 3/22 and informed provider he had not picked up steroid since he does not feel comfortable taking without azithromycin. Encourage to . Patient picked up but has only intermittently taken a couple pills from it.   Asking for azithromycin. Denying fevers/chills.   Patient with suspected COPD however has not had PFTs performed. Prior similar admission in June 2024. Patient was recommended to follow up with pulmonary outpatient and has not done so. Over the past year with multiple ER visits for SOB/wheezing.   Covid/flu/rsv negative   WBC 12.56  Suspect elevation in setting of steroid use.  Denies fevers.  Pro-Farzad pending  Home regimen:   Albuterol prn   Plan   IV solumedrol 40 mg q 12 hours. Most likely can be transitioned to oral prednisone tomorrow.   Xopenox/atrovent tid   PO Azithromycin 500 mg x 3 days   Albuterol prn   Respiratory protocol   Consult pulmonology

## 2025-04-09 NOTE — DISCHARGE INSTRUCTIONS
Patient Education     COPD Exacerbation, Adult ED   General Information   You came to the Emergency Department (ED) for worsening of chronic bronchitis or emphysema. COPD, or chronic obstructive pulmonary disease, is another name for these problems. This is often caused by inflammation of the bronchial tubes that carry air into your lungs or by infection. Chronic bronchitis and emphysema are mostly caused by smoking. It can also be caused by breathing in toxic fumes or gases. With chronic bronchitis, your cough will bring up mucus and can last for months.  What care is needed at home?   Call your regular doctor to let them know you were in the ED. Make a follow-up appointment if you were told to.  If you smoke, try to quit. Your doctor or nurse can help you with this.  Stay away from smoke-filled places. Avoid other things that may cause breathing problems like fumes, pollution, dust, and other common allergens.  If you have an inhaler to take when you are feeling short of breath, be sure to carry it with you all the time. Then, you can take it when needed. Take all your other medicines as directed.  The doctor may have ordered antibiotics to treat an infection. It is important to take all of your antibiotics even if you start to feel better.  When do I need to get emergency help?   Call for an ambulance right away if:   You are having so much trouble breathing that you can only say one or two words at a time.  You need to sit upright at all times to be able to breathe and or cannot lie down.  You are very tired from working to catch your breath or you are sweating from trying to breathe.  You are coughing up a lot of blood (more than 1 teaspoon or 5 mL).  You have signs of a heart attack, which may include:  Severe chest pain, pressure, or discomfort with:  Breathing trouble; sweating; upset stomach; or cold, clammy skin.  Pain in your arms, back, or jaw.  Worse pain with activity like walking up stairs.  Fast or  irregular heartbeat.  Feeling dizzy, faint, or weak.  Return to the ED if:   You have trouble breathing when talking or sitting still.  You cough up a small amount of blood (less than 1 teaspoon or 5 mL).  You have a fever of 100.4°F (38°C) or higher or chills.  When do I need to call the doctor?   You are feeling weak or more short of breath than normal when doing your activities.  You have new or worsening cough.  You cough up more mucus.  You feel more short of breath.  You have new or worsening symptoms.  Last Reviewed Date   2020-08-19  Consumer Information Use and Disclaimer   This generalized information is a limited summary of diagnosis, treatment, and/or medication information. It is not meant to be comprehensive and should be used as a tool to help the user understand and/or assess potential diagnostic and treatment options. It does NOT include all information about conditions, treatments, medications, side effects, or risks that may apply to a specific patient. It is not intended to be medical advice or a substitute for the medical advice, diagnosis, or treatment of a health care provider based on the health care provider's examination and assessment of a patient’s specific and unique circumstances. Patients must speak with a health care provider for complete information about their health, medical questions, and treatment options, including any risks or benefits regarding use of medications. This information does not endorse any treatments or medications as safe, effective, or approved for treating a specific patient. [employer] and its affiliates disclaim any warranty or liability relating to this information or the use thereof. The use of this information is governed by the Terms of Use, available at https://www.woltersEpomuwer.com/en/know/clinical-effectiveness-terms   Copyright   Copyright © 2024 [employer], Inc[employer] and its affiliates and/or licensors. All rights reserved.

## 2025-04-10 ENCOUNTER — TELEPHONE (OUTPATIENT)
Age: 65
End: 2025-04-10

## 2025-04-10 VITALS
TEMPERATURE: 98.7 F | OXYGEN SATURATION: 96 % | RESPIRATION RATE: 16 BRPM | HEART RATE: 85 BPM | BODY MASS INDEX: 29.85 KG/M2 | SYSTOLIC BLOOD PRESSURE: 138 MMHG | HEIGHT: 67 IN | DIASTOLIC BLOOD PRESSURE: 54 MMHG | WEIGHT: 190.2 LBS

## 2025-04-10 PROBLEM — D17.21 LIPOMA OF RIGHT UPPER EXTREMITY: Status: ACTIVE | Noted: 2025-04-10

## 2025-04-10 PROBLEM — J45.41 MODERATE PERSISTENT ASTHMA WITH ACUTE EXACERBATION: Status: ACTIVE | Noted: 2025-04-10

## 2025-04-10 LAB
ANION GAP SERPL CALCULATED.3IONS-SCNC: 10 MMOL/L (ref 4–13)
BUN SERPL-MCNC: 13 MG/DL (ref 5–25)
CALCIUM SERPL-MCNC: 9.3 MG/DL (ref 8.4–10.2)
CHLORIDE SERPL-SCNC: 103 MMOL/L (ref 96–108)
CO2 SERPL-SCNC: 26 MMOL/L (ref 21–32)
CREAT SERPL-MCNC: 0.7 MG/DL (ref 0.6–1.3)
ERYTHROCYTE [DISTWIDTH] IN BLOOD BY AUTOMATED COUNT: 13.8 % (ref 11.6–15.1)
GFR SERPL CREATININE-BSD FRML MDRD: 99 ML/MIN/1.73SQ M
GLUCOSE SERPL-MCNC: 193 MG/DL (ref 65–140)
HCT VFR BLD AUTO: 42.6 % (ref 36.5–49.3)
HGB BLD-MCNC: 14.5 G/DL (ref 12–17)
MCH RBC QN AUTO: 32.4 PG (ref 26.8–34.3)
MCHC RBC AUTO-ENTMCNC: 34 G/DL (ref 31.4–37.4)
MCV RBC AUTO: 95 FL (ref 82–98)
PLATELET # BLD AUTO: 326 THOUSANDS/UL (ref 149–390)
PMV BLD AUTO: 11.2 FL (ref 8.9–12.7)
POTASSIUM SERPL-SCNC: 4.7 MMOL/L (ref 3.5–5.3)
RBC # BLD AUTO: 4.48 MILLION/UL (ref 3.88–5.62)
SODIUM SERPL-SCNC: 139 MMOL/L (ref 135–147)
WBC # BLD AUTO: 15.31 THOUSAND/UL (ref 4.31–10.16)

## 2025-04-10 PROCEDURE — 80048 BASIC METABOLIC PNL TOTAL CA: CPT | Performed by: INTERNAL MEDICINE

## 2025-04-10 PROCEDURE — 94640 AIRWAY INHALATION TREATMENT: CPT

## 2025-04-10 PROCEDURE — 94760 N-INVAS EAR/PLS OXIMETRY 1: CPT

## 2025-04-10 PROCEDURE — 99239 HOSP IP/OBS DSCHRG MGMT >30: CPT | Performed by: INTERNAL MEDICINE

## 2025-04-10 PROCEDURE — 85027 COMPLETE CBC AUTOMATED: CPT | Performed by: INTERNAL MEDICINE

## 2025-04-10 PROCEDURE — 99232 SBSQ HOSP IP/OBS MODERATE 35: CPT | Performed by: NURSE PRACTITIONER

## 2025-04-10 RX ORDER — PREDNISONE 20 MG/1
TABLET ORAL
Qty: 15 TABLET | Refills: 0 | Status: SHIPPED | OUTPATIENT
Start: 2025-04-11 | End: 2025-04-23

## 2025-04-10 RX ORDER — BUDESONIDE AND FORMOTEROL FUMARATE DIHYDRATE 160; 4.5 UG/1; UG/1
2 AEROSOL RESPIRATORY (INHALATION) 2 TIMES DAILY
Qty: 10.2 G | Refills: 0 | Status: SHIPPED | OUTPATIENT
Start: 2025-04-10

## 2025-04-10 RX ORDER — ALBUTEROL SULFATE 0.83 MG/ML
2.5 SOLUTION RESPIRATORY (INHALATION) EVERY 6 HOURS PRN
Qty: 25 ML | Refills: 0 | Status: SHIPPED | OUTPATIENT
Start: 2025-04-10 | End: 2025-05-10

## 2025-04-10 RX ADMIN — LEVALBUTEROL HYDROCHLORIDE 1.25 MG: 1.25 SOLUTION RESPIRATORY (INHALATION) at 08:31

## 2025-04-10 NOTE — PROGRESS NOTES
"Progress Note - Pulmonology   Name: Albert Jensen 64 y.o. male I MRN: [id number]  Unit/Bed#: -01 I Date of Admission: 4/8/2025   Date of Service: 4/10/2025 I Hospital Day: 1     Assessment & Plan  Moderate persistent asthma with acute exacerbation  -Eos- 490--190--370--570--220  -Patient has never had formal PFT testing  -Day # 1/3-prednisone 40 mg decrease by 10 mg every 3 days  -Please discharge patient on: Symbicort 160/4.5 mcg 2 puffs twice daily, Proventil 2 puffs every 6 as needed, albuterol nebulizer every 6 as needed  -CM referral placed for nebulizer/supplies  -Will need close pulmonary follow-up and formal PFT testing  -Patient may benefit from [facility]  -[place] allergy panel ordered  -Encouraged attempts at cleaning his home or attempting to find another location to live        -Pulmonary will sign off  -Outpatient follow-up per discharge instructions    24 Hour Events : na  Subjective : \"I do not want to go home until this lipoma is taking care of\"    Objective :  Temp:  [97.8 °F (36.6 °C)-98.7 °F (37.1 °C)] 98.7 °F (37.1 °C)  HR:  [73-89] 85  BP: (138-148)/(54-94) 138/54  Resp:  [16-18] 16  SpO2:  [91 %-96 %] 96 %  O2 Device: None (Room air)    Physical Exam  Constitutional:       General: He is not in acute distress.     Appearance: Normal appearance. He is normal weight.   HENT:      Head: Normocephalic and atraumatic.      Nose: Nose normal. No congestion or rhinorrhea.      Mouth/Throat:      Mouth: Mucous membranes are dry.      Pharynx: Oropharynx is clear. No oropharyngeal exudate or posterior oropharyngeal erythema.   Cardiovascular:      Rate and Rhythm: Normal rate and regular rhythm.      Pulses: Normal pulses.      Heart sounds: Normal heart sounds. No murmur heard.     No friction rub. No gallop.   Pulmonary:      Effort: Pulmonary effort is normal. No respiratory distress.      Breath sounds: No stridor. No wheezing, rhonchi or rales.      Comments: Overall clear  Chest:      " Chest wall: No tenderness.   Abdominal:      General: Abdomen is flat. Bowel sounds are normal. There is no distension.      Palpations: Abdomen is soft. There is no mass.   Musculoskeletal:         General: No swelling or tenderness. Normal range of motion.      Cervical back: Normal range of motion. No rigidity or tenderness.   Skin:     General: Skin is warm and dry.      Coloration: Skin is not jaundiced or pale.   Neurological:      General: No focal deficit present.      Mental Status: He is alert and oriented to person, place, and time. Mental status is at baseline.   Psychiatric:         Mood and Affect: Mood normal.         Behavior: Behavior normal.             Lab Results: I have reviewed the following results:   .     04/10/25  0447   WBC 15.31*   HGB 14.5   HCT 42.6      SODIUM 139   K 4.7      CO2 26   BUN 13   CREATININE 0.70   GLUC 193*     ABG: No new results in last 24 hours.    Imaging Results Review: I personally reviewed the following image studies/reports in PACS and discussed pertinent findings with Radiology: chest xray. My interpretation of the radiology images/reports is:  .  Other Study Results Review: EKG was reviewed.   PFT Results Reviewed: reviewed

## 2025-04-10 NOTE — ASSESSMENT & PLAN NOTE
Presents due to worsening shortness of breath associated with wheezing over the past 3 days  Seen in ER on 3/18. Prescribed medrol dose lyndon. Patient seen again on 3/22 and informed provider he had not picked up steroid since he does not feel comfortable taking without azithromycin. Encourage to . Patient picked up but has only intermittently taken a couple pills from it.   Asking for azithromycin. Denying fevers/chills.    has not had PFTs performed. Prior similar admission in June 2024. Patient was recommended to follow up with pulmonary outpatient and has not done so. Over the past year with multiple ER visits for SOB/wheezing.   Home regimen:   Albuterol prn   Patient might have asthma exacerbation/reactive airways  Patient is telling me he lives in a room with dust and mold and that was the reason for his symptoms.  Advised him to discuss with his landlord will look for a different room  Patient is refusing his meds.  Clear lungs no wheezing.  Discharged home on prednisone 40 mg daily and decrease 10 mg every 3 days, albuterol nebulizer, Symbicort 160/4.52 puffs twice daily, albuterol as needed  Follow-up with pulmonology outpatient  I have personally counseled the patient on medication indication, compliance, utilization and side effects. Patient may be discharged home with albuterol , albuterol -vilanterol.

## 2025-04-10 NOTE — CASE MANAGEMENT
"   Case Management Discharge Planning Note    Patient name Albert Jensen  Location /-01 MRN [id number]  : 1960 Date 4/10/2025       Current Admission Date: 2025  Current Admission Diagnosis:Shortness of breath   Patient Active Problem List    Diagnosis Date Noted Date Diagnosed    Moderate persistent asthma with acute exacerbation 04/10/2025     Lipoma of right upper extremity 04/10/2025     Rosacea 2024     Skin tumor 2024     Shortness of breath 2024     Elevated blood pressure reading 2024     Varicose veins of both lower extremities 2021     Acute bronchitis 2021     Suspected reactive airway disease 2021     Schizophrenia (HCC) 2021     Hypokalemia 2021       LOS (days): 1  Geometric Mean LOS (GMLOS) (days):   Days to GMLOS:     OBJECTIVE:  Risk of Unplanned Readmission Score: 18.78         Current admission status: Inpatient   Preferred Pharmacy:   [facility]/pharmacy #[id number] - [place] - [address] [place] [address]  Phone: [phone] Fax: [phone]    Primary Care Provider: Renzo Grullon MD    Primary Insurance: [employer]  Secondary Insurance:     DISCHARGE DETAILS:   Met with patient, provided indigent  nebulizer and 2 inhalers for use at home. Notified him  he was discharged. He insisted on staying here to get federal things in order, things most people don't know about\" he states   he has insurance, placed call to the number he said was his insurance at [facility] at [phone], ex [phone] and was informed is was  a 3rd party billing company for volunteer ambulance companies. 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 He asked to speak with his attending.                                                                                                                    "

## 2025-04-10 NOTE — CASE MANAGEMENT
Case Management Discharge Planning Note    Patient name Albert Jensen  Location /-01 MRN [id number]  : 1960 Date 4/10/2025       Current Admission Date: 2025  Current Admission Diagnosis:Shortness of breath   Patient Active Problem List    Diagnosis Date Noted Date Diagnosed    Moderate persistent asthma with acute exacerbation 04/10/2025     Lipoma of right upper extremity 04/10/2025     Rosacea 2024     Skin tumor 2024     Shortness of breath 2024     Elevated blood pressure reading 2024     Varicose veins of both lower extremities 2021     Acute bronchitis 2021     Suspected reactive airway disease 2021     Schizophrenia (HCC) 2021     Hypokalemia 2021       LOS (days): 1  Geometric Mean LOS (GMLOS) (days):   Days to GMLOS:     OBJECTIVE:  Risk of Unplanned Readmission Score: 19         Current admission status: Inpatient   Preferred Pharmacy:   [facility]/pharmacy #[id number] - [place] - [address] [address]  Phone: [phone] Fax: [phone]    Primary Care Provider: Renzo Grullon MD    Primary Insurance: [employer]  Secondary Insurance:     DISCHARGE DETAILS:     Met with patient and offered nebulizer, he refused . He refused inhalers. Patient's wishes were to stay here. With Dr Balderas  he was informed he was ready for discharge and the surgery he wanted on his shoulder would not be completed here.  He was discharged home. He was not interested in a Lyft as her stated he drove self her.

## 2025-04-10 NOTE — DISCHARGE SUMMARY
Discharge Summary - Hospitalist   Name: Albert Jensen 64 y.o. male I MRN: [id number]  Unit/Bed#: -01 I Date of Admission: 4/8/2025   Date of Service: 4/10/2025 I Hospital Day: 1     Assessment & Plan  Shortness of breath  Presents due to worsening shortness of breath associated with wheezing over the past 3 days  Seen in ER on 3/18. Prescribed medrol dose lyndon. Patient seen again on 3/22 and informed provider he had not picked up steroid since he does not feel comfortable taking without azithromycin. Encourage to . Patient picked up but has only intermittently taken a couple pills from it.   Asking for azithromycin. Denying fevers/chills.    has not had PFTs performed. Prior similar admission in June 2024. Patient was recommended to follow up with pulmonary outpatient and has not done so. Over the past year with multiple ER visits for SOB/wheezing.   Home regimen:   Albuterol prn   Patient might have asthma exacerbation/reactive airways  Patient is telling me he lives in a room with dust and mold and that was the reason for his symptoms.  Advised him to discuss with his landlord will look for a different room  Patient is refusing his meds.  Clear lungs no wheezing.  Discharged home on prednisone 40 mg daily and decrease 10 mg every 3 days, albuterol nebulizer, Symbicort 160/4.52 puffs twice daily, albuterol as needed  Follow-up with pulmonology outpatient  I have personally counseled the patient on medication indication, compliance, utilization and side effects. Patient may be discharged home with albuterol , albuterol -vilanterol.    Skin tumor  Patient with known skin mass on his right shoulder present for years.   Had been seen by surgery and and had plan for excision of right shoulder skin mass.  However patient failed to complete preadmission testing and preoperative clearance.   Recommend patient follow-up with surgery once discharged  Schizophrenia (HCC)  Not on any antipsychotics  outpatient  Moderate persistent asthma with acute exacerbation    Lipoma of right upper extremity  Patient has a lipoma on the right shoulder.  He is asking for surgery to take care of the lipoma during this admission.  Spoke with surgery, per surgery intervention will not be done as outpatient.  He will need to follow-up outpatient for lipoma.  Patient also requesting his chronic problem to be fixed prior to discharge like lower extremity varices.  Again advised him to follow-up outpatient as those procedure will not be done inpatient.     Medical Problems       Resolved Problems  Date Reviewed: 9/6/2024   None       Discharging Physician / Practitioner: Dianne Louise MD  PCP: Renzo Grullon MD  Admission Date:   Admission Orders (From admission, onward)       Ordered        04/09/25 0946  INPATIENT ADMISSION  Once            04/09/25 0032  Place in Observation  Once                          Discharge Date: 04/10/25    Consultations During Hospital Stay:  pulmonology    Procedures Performed:   none    Significant Findings / Test Results:   XR chest 2 views  Result Date: 4/9/2025  Impression: No acute cardiopulmonary disease. Resident: Oneil Maxwell I, the attending radiologist, have reviewed the images and agree with the final report above.     Incidental Findings:   none     Test Results Pending at Discharge (will require follow up):   none     Outpatient Tests Requested:  none    Complications:  none    Reason for Admission: sob    Hospital Course:   Albert Jensen is a 64 y.o. male patient who originally presented to the hospital on 4/8/2025 due to shortness of breath.  Patient has history of frequent ED presentation for shortness of breath and cough, during the last admission it was recommended patient to follow-up with pulmonology and was discharged on inhalers however patient noncompliant with medications and also noncompliant with outpatient follow-up.  He thinks his lung issues are due to living  "conditions, he lives in the room with dust and mold and he needs another place eventually.    He was admitted for asthma exacerbation and was started on steroids IV and inhalers, day after admission patient felt improved however he refused to take oral prednisone or inhalers afterwards.    Patient on room air without wheezing feeling well.  Stable for discharge home.  Patient was discharged on inhalers per pulmonology recommendations.  Recommended him to follow-up with pulmonology outpatient.        Please see above list of diagnoses and related plan for additional information.     Condition at Discharge: good    Discharge Day Visit / Exam:   Subjective: No shortness of breath  Vitals: Blood Pressure: 138/54 (04/10/25 0824)  Pulse: 85 (04/10/25 0824)  Temperature: 98.7 °F (37.1 °C) (04/10/25 0824)  Temp Source: Temporal (04/10/25 0824)  Respirations: 16 (04/10/25 0824)  Height: 5' 7\" (170.2 cm) (04/09/25 0232)  Weight - Scale: 86.3 kg (190 lb 3.2 oz) (04/09/25 0232)  SpO2: 96 % (04/10/25 0831)  Physical Exam  Vitals and nursing note reviewed.   Constitutional:       General: He is not in acute distress.     Appearance: He is not diaphoretic.   HENT:      Head: Normocephalic.   Eyes:      General:         Right eye: No discharge.         Left eye: No discharge.   Cardiovascular:      Rate and Rhythm: Normal rate and regular rhythm.      Heart sounds: No murmur heard.  Pulmonary:      Effort: Pulmonary effort is normal. No respiratory distress.      Breath sounds: Normal breath sounds. No wheezing, rhonchi or rales.   Abdominal:      General: There is no distension.      Palpations: Abdomen is soft.      Tenderness: There is no abdominal tenderness. There is no guarding or rebound.   Musculoskeletal:      Cervical back: Normal range of motion.      Right lower leg: No edema.      Left lower leg: No edema.   Skin:     General: Skin is warm.   Neurological:      Mental Status: He is alert and oriented to person, place, " and time.   Psychiatric:         Mood and Affect: Mood normal.         Behavior: Behavior normal.          Discussion with Family: Updated  (niece) via phone.    Discharge instructions/Information to patient and family:   See after visit summary for information provided to patient and family.      Provisions for Follow-Up Care:  See after visit summary for information related to follow-up care and any pertinent home health orders.      Mobility at time of Discharge:   Basic Mobility Inpatient Raw Score: 24  JH-HLM Goal: 8: Walk 250 feet or more  JH-HLM Achieved: 8: Walk 250 feet ot more  HLM Goal achieved. Continue to encourage appropriate mobility.     Disposition:   Home    Planned Readmission: no    Discharge Medications:  See after visit summary for reconciled discharge medications provided to patient and/or family.      Administrative Statements   Discharge Statement:  I have spent a total time of 36 minutes in caring for this patient on the day of the visit/encounter. .    **Please Note: This note may have been constructed using a voice recognition system**

## 2025-04-10 NOTE — PLAN OF CARE
Problem: INFECTION - ADULT  Goal: Absence or prevention of progression during hospitalization  Description: INTERVENTIONS:- Assess and monitor for signs and symptoms of infection- Monitor lab/diagnostic results- Monitor all insertion sites, i.e. indwelling lines, tubes, and drains- Monitor endotracheal if appropriate and nasal secretions for changes in amount and color- [place] appropriate cooling/warming therapies per order- Administer medications as ordered- Instruct and encourage patient and family to use good hand hygiene technique- Identify and instruct in appropriate isolation precautions for identified infection/condition  Outcome: Progressing  Goal: Absence of fever/infection during neutropenic period  Description: INTERVENTIONS:- Monitor WBC  Outcome: Progressing     Problem: SAFETY ADULT  Goal: Patient will remain free of falls  Description: INTERVENTIONS:- Educate patient/family on patient safety including physical limitations- Instruct patient to call for assistance with activity - Consult OT/PT to assist with strengthening/mobility - Keep Call bell within reach- Keep bed low and locked with side rails adjusted as appropriate- Keep care items and personal belongings within reach- Initiate and maintain comfort rounds- Make Fall Risk Sign visible to staff- Offer Toileting every 2 Hours, in advance of need- Initiate/Maintain alarm- Obtain necessary fall risk management equipment: - Apply yellow socks and bracelet for high fall risk patients- Consider moving patient to room near nurses station  Outcome: Progressing     Problem: RESPIRATORY - ADULT  Goal: Achieves optimal ventilation and oxygenation  Description: INTERVENTIONS:- Assess for changes in respiratory status- Assess for changes in mentation and behavior- Position to facilitate oxygenation and minimize respiratory effort- Oxygen administered by appropriate delivery if ordered- Initiate smoking cessation education as indicated- Encourage  broncho-pulmonary hygiene including cough, deep breathe, Incentive Spirometry- Assess the need for suctioning and aspirate as needed- Assess and instruct to report SOB or any respiratory difficulty- Respiratory Therapy support as indicated  Outcome: Progressing     Problem: Nutrition/Hydration-ADULT  Goal: Nutrient/Hydration intake appropriate for improving, restoring or maintaining nutritional needs  Description: Monitor and assess patient's nutrition/hydration status for malnutrition. Collaborate with interdisciplinary team and initiate plan and interventions as ordered.  Monitor patient's weight and dietary intake as ordered or per policy. Utilize nutrition screening tool and intervene as necessary. Determine patient's food preferences and provide high-protein, high-caloric foods as appropriate. INTERVENTIONS:- Monitor oral intake, urinary output, labs, and treatment plans- Assess nutrition and hydration status and recommend course of action- Evaluate amount of meals eaten- Assist patient with eating if necessary - Allow adequate time for meals- Recommend/ encourage appropriate diets, oral nutritional supplements, and vitamin/mineral supplements- Order, calculate, and assess calorie counts as needed- Recommend, monitor, and adjust tube feedings and TPN/PPN based on assessed needs- Assess need for intravenous fluids- Provide specific nutrition/hydration education as appropriate- Include patient/family/caregiver in decisions related to nutrition  Outcome: Progressing

## 2025-04-10 NOTE — ASSESSMENT & PLAN NOTE
-Eos- 490--190--370--570--220  -Patient has never had formal PFT testing  -Day # 1/3-prednisone 40 mg decrease by 10 mg every 3 days  -Please discharge patient on: Symbicort 160/4.5 mcg 2 puffs twice daily, Proventil 2 puffs every 6 as needed, albuterol nebulizer every 6 as needed  -CM referral placed for nebulizer/supplies  -Will need close pulmonary follow-up and formal PFT testing  -Patient may benefit from [facility]olog[facility]  -[facility] allergy panel ordered  -Encouraged attempts at cleaning his home or attempting to find another location to live        -Pulmonary will sign off  -Outpatient follow-up per discharge instructions

## 2025-04-10 NOTE — NURSING NOTE
Patient made aware of discharge.  Patient insisting on speaking with Dr. Louise.  Dr. Louise at bedside, explained to patient that he is medically cleared for discharge.  This RN attempted to review discharge instructions with patient.  Patient refused.  Home medication returned to patient, inhalers offered, patient refused.  IV removed, Patient ambulatory to elevator, states he drove himself to ED and will drive himself home.

## 2025-04-10 NOTE — PLAN OF CARE
Problem: INFECTION - ADULT  Goal: Absence or prevention of progression during hospitalization  Description: INTERVENTIONS:- Assess and monitor for signs and symptoms of infection- Monitor lab/diagnostic results- Monitor all insertion sites, i.e. indwelling lines, tubes, and drains- Monitor endotracheal if appropriate and nasal secretions for changes in amount and color- [place] appropriate cooling/warming therapies per order- Administer medications as ordered- Instruct and encourage patient and family to use good hand hygiene technique- Identify and instruct in appropriate isolation precautions for identified infection/condition  Outcome: Progressing     Problem: RESPIRATORY - ADULT  Goal: Achieves optimal ventilation and oxygenation  Description: INTERVENTIONS:- Assess for changes in respiratory status- Assess for changes in mentation and behavior- Position to facilitate oxygenation and minimize respiratory effort- Oxygen administered by appropriate delivery if ordered- Initiate smoking cessation education as indicated- Encourage broncho-pulmonary hygiene including cough, deep breathe, Incentive Spirometry- Assess the need for suctioning and aspirate as needed- Assess and instruct to report SOB or any respiratory difficulty- Respiratory Therapy support as indicated  Outcome: Progressing

## 2025-04-10 NOTE — ASSESSMENT & PLAN NOTE
Patient has a lipoma on the right shoulder.  He is asking for surgery to take care of the lipoma during this admission.  Spoke with surgery, per surgery intervention will not be done as outpatient.  He will need to follow-up outpatient for lipoma.  Patient also requesting his chronic problem to be fixed prior to discharge like lower extremity varices.  Again advised him to follow-up outpatient as those procedure will not be done inpatient.

## 2025-04-11 ENCOUNTER — PATIENT OUTREACH (OUTPATIENT)
Dept: CASE MANAGEMENT | Facility: OTHER | Age: 65
End: 2025-04-11

## 2025-04-11 NOTE — LETTER
Date: 04/11/25    Dear Albert Jensen,   My name is Yaz Landaverde and I am a registered nurse care manager working with  [facility] - [facility].     I have not been able to reach you and would like to set a time that I can talk with you over the phone.    My work is to help patients that have complex medical conditions get the care they need. This includes patients who may have been in the hospital or emergency room.  I work to get to know you, and help you in your care, your recovery, or assist you reach your personal health goals.  I also help identify barriers keeping you from getting the medical attention you deserve and I assist to connect you with community resources as needed.     Please call me at [phone] with any questions you may have. I look forward to speaking with you.    Sincerely,    Yaz Landaverde, RN    Yaz Landaverde, RN  Outpatient Care Manager    [phone]

## 2025-04-11 NOTE — PROGRESS NOTES
Outpatient Care Management Note:  Inbasket ADT alert received that patient was discharged from [facility] on 4/10/25.  Patient was admitted on 4/8/25 for SOB.  Patient was cleared to discharge to home with scripts for arithromyacin and prednisone tape and recommendation to follow up with PCP and establish with [facility].  Patient also instructed to follow up with surgery, complete preoperative clearance for surgical removal of right shoulder skin mass.    Patient with know history of non adherence to home medications    PMH:  Schizophrenia, lipoma of R shoulder, suspected reactive airway disease, asthma    Future Appointments:  None noted in EPIC    Second telephone outreach attempt made to assist with transitional care coordination, review meds, identify needs and complete health assessment.    No answer.  Mailbox is full and unable to leave message.  Outreach attempts by phone have been unsuccessful.  An unable to reach letter is being sent to address listed in chart.

## 2025-04-14 ENCOUNTER — TELEPHONE (OUTPATIENT)
Age: 65
End: 2025-04-14

## 2025-04-14 ENCOUNTER — PATIENT OUTREACH (OUTPATIENT)
Dept: CASE MANAGEMENT | Facility: OTHER | Age: 65
End: 2025-04-14

## 2025-04-14 NOTE — PROGRESS NOTES
OP.FREEDOM MARINO received in basket via / RU POO lto follow up with patient. Chart review completed. Multiple attempts to reach patient and a unable to reach letter was sent on 04/11. No care plan so JAMILA LOJA will be closing patient at this time . Episode resolved and removed from care team.

## 2025-04-16 ENCOUNTER — HOSPITAL ENCOUNTER (EMERGENCY)
Facility: HOSPITAL | Age: 65
Discharge: HOME | End: 2025-04-16
Attending: EMERGENCY MEDICINE

## 2025-04-16 VITALS
TEMPERATURE: 98.3 F | HEIGHT: 67 IN | RESPIRATION RATE: 18 BRPM | SYSTOLIC BLOOD PRESSURE: 189 MMHG | OXYGEN SATURATION: 97 % | WEIGHT: 210 LBS | DIASTOLIC BLOOD PRESSURE: 95 MMHG | BODY MASS INDEX: 32.96 KG/M2 | HEART RATE: 78 BPM

## 2025-04-16 DIAGNOSIS — D17.21 LIPOMA OF RIGHT SHOULDER: Primary | ICD-10-CM

## 2025-04-16 PROCEDURE — 99282 EMERGENCY DEPT VISIT SF MDM: CPT

## 2025-04-16 PROCEDURE — 99283 EMERGENCY DEPT VISIT LOW MDM: CPT | Performed by: PHYSICIAN ASSISTANT

## 2025-04-16 NOTE — ED PROVIDER NOTES
Time reflects when diagnosis was documented in both MDM as applicable and the Disposition within this note       Time User Action Codes Description Comment    4/16/2025  6:19 PM Ana Rosa Waldrop Add [D17.21] Lipoma of right shoulder           ED Disposition       ED Disposition   Discharge    Condition   Stable    Date/Time   Wed Apr 16, 2025  6:18 PM    Comment   Albert Jensen discharge to home/self care.                   Assessment & Plan       Medical Decision Making  Patient requesting admission to have lipoma removed, d/w surgery, patient will need to f/u with surgeon for procedure.    Explained to patient he will need medical clearance prior to surgery and he should call PCP tomorrow for clearance and then f/u with surgeon.          ED Course as of 04/16/25 1834 Wed Apr 16, 2025 1817 D/w Rocio from surgery, patient needs to obtain medical clearance prior to surgery.        Medications - No data to display    ED Risk Strat Scores                    No data recorded        SBIRT 22yo+      Flowsheet Row Most Recent Value   Initial Alcohol Screen: US AUDIT-C     1. How often do you have a drink containing alcohol? 0 Filed at: 04/16/2025 1813   2. How many drinks containing alcohol do you have on a typical day you are drinking?  0 Filed at: 04/16/2025 1813   3a. Male UNDER 65: How often do you have five or more drinks on one occasion? 0 Filed at: 04/16/2025 1813   3b. FEMALE Any Age, or MALE 65+: How often do you have 4 or more drinks on one occassion? 0 Filed at: 04/16/2025 1813   Audit-C Score 0 Filed at: 04/16/2025 1813   ANJELICA: How many times in the past year have you...    Used an illegal drug or used a prescription medication for non-medical reasons? Never Filed at: 04/16/2025 1813                            History of Present Illness       Chief Complaint   Patient presents with    Shoulder Pain     Pt c/o of right shoulder pain. Reports he is due for a surgical procedure for mass to right shoulder  "that he has not yet scheduled. \"States I have discomfort and want to be admitted.\"        Past Medical History:   Diagnosis Date    Deafness in right ear     Hypertension       Past Surgical History:   Procedure Laterality Date    SPLENECTOMY, TOTAL        History reviewed. No pertinent family history.   Social History     Tobacco Use    Smoking status: Former     Current packs/day: 0.00     Types: Cigarettes     Quit date: 1987     Years since quittin.5    Smokeless tobacco: Never   Vaping Use    Vaping status: Never Used   Substance Use Topics    Alcohol use: Not Currently    Drug use: Not Currently      E-Cigarette/Vaping    E-Cigarette Use Never User       E-Cigarette/Vaping Substances    Nicotine No     THC No     CBD No     Flavoring No     Other No     Unknown No       I have reviewed and agree with the history as documented.     Patient is a 63 y/o M that presents to the ED requesting to be admitted to have mass from right shoulder excised.  He states he was supposed to get it removed in October of last year, but his \"family thwarted his attempt.\"  Patient states he also wants his hair follicles removed from around his ears and his nose. He also states he would like plastic surgeon to look at the scar on his back from prior cysts removal.  He has no other complaints.       History provided by:  Patient  Shoulder Pain  Associated symptoms: no fever        Review of Systems   Constitutional:  Negative for chills and fever.   Musculoskeletal:         Lipoma right shoulder   Skin:  Negative for color change, pallor and rash.   Neurological:  Negative for dizziness, weakness and numbness.   Psychiatric/Behavioral:  Negative for confusion.    All other systems reviewed and are negative.          Objective       ED Triage Vitals [25 1749]   Temperature Pulse Blood Pressure Respirations SpO2 Patient Position - Orthostatic VS   98.3 °F (36.8 °C) 78 (!) 189/95 18 97 % --      Temp Source Heart Rate " Source BP Location FiO2 (%) Pain Score    Oral Monitor -- -- No Pain      Vitals      Date and Time Temp Pulse SpO2 Resp BP Pain Score FACES Pain Rating User   04/16/25 1813 -- -- -- -- -- No Pain -- KB   04/16/25 1749 98.3 °F (36.8 °C) 78 97 % 18 189/95 No Pain -- TH            Physical Exam  Vitals reviewed.   Constitutional:       General: He is not in acute distress.     Appearance: Normal appearance. He is well-developed and well-groomed. He is not ill-appearing or diaphoretic.   HENT:      Head: Normocephalic and atraumatic.   Eyes:      Conjunctiva/sclera: Conjunctivae normal.   Cardiovascular:      Rate and Rhythm: Normal rate and regular rhythm.      Pulses:           Radial pulses are 2+ on the right side.      Heart sounds: Normal heart sounds.   Pulmonary:      Effort: Pulmonary effort is normal.      Breath sounds: Normal breath sounds.   Musculoskeletal:      Cervical back: Normal range of motion.   Skin:     General: Skin is warm and dry.      Findings: No bruising or erythema.      Comments: Lipoma to right shoulder.    Neurological:      Mental Status: He is alert and oriented to person, place, and time.      Sensory: Sensation is intact.      Motor: Motor function is intact.      Gait: Gait is intact.   Psychiatric:         Behavior: Behavior is cooperative.         Results Reviewed       None            No orders to display       Procedures    ED Medication and Procedure Management   Prior to Admission Medications   Prescriptions Last Dose Informant Patient Reported? Taking?   albuterol (2.5 mg/3 mL) 0.083 % nebulizer solution   No No   Sig: Take 3 mL (2.5 mg total) by nebulization every 6 (six) hours as needed for wheezing or shortness of breath   albuterol (Proventil HFA) 90 mcg/act inhaler   No No   Sig: Inhale 1-2 puffs every 4 (four) hours as needed for wheezing or shortness of breath   benzonatate (TESSALON PERLES) 100 mg capsule   No No   Sig: Take 1 capsule (100 mg total) by mouth every 8  (eight) hours   budesonide-formoterol (Symbicort) 160-4.5 mcg/act inhaler   No No   Sig: Inhale 2 puffs 2 (two) times a day Rinse mouth after use.   metroNIDAZOLE (METROCREAM) 0.75 % cream   No No   Sig: Apply 1 Application topically 2 (two) times a day   predniSONE 20 mg tablet   No No   Sig: Take 2 tablets (40 mg total) by mouth daily for 3 days, THEN 1.5 tablets (30 mg total) daily for 3 days, THEN 1 tablet (20 mg total) daily for 3 days, THEN 0.5 tablets (10 mg total) daily for 3 days.      Facility-Administered Medications: None     Patient's Medications   Discharge Prescriptions    No medications on file     No discharge procedures on file.  ED SEPSIS DOCUMENTATION   Time reflects when diagnosis was documented in both MDM as applicable and the Disposition within this note       Time User Action Codes Description Comment    4/16/2025  6:19 PM Ana Rosa Waldrop Add [D17.21] Lipoma of right shoulder                  Ana Rosa Waldrop PA-C  04/16/25 1834

## 2025-04-21 ENCOUNTER — HOSPITAL ENCOUNTER (EMERGENCY)
Facility: HOSPITAL | Age: 65
Discharge: HOME | End: 2025-04-21
Attending: EMERGENCY MEDICINE

## 2025-04-21 ENCOUNTER — TELEPHONE (OUTPATIENT)
Age: 65
End: 2025-04-21

## 2025-04-21 VITALS
TEMPERATURE: 98.1 F | OXYGEN SATURATION: 98 % | HEART RATE: 70 BPM | SYSTOLIC BLOOD PRESSURE: 207 MMHG | DIASTOLIC BLOOD PRESSURE: 72 MMHG | RESPIRATION RATE: 16 BRPM

## 2025-04-21 DIAGNOSIS — D17.21 LIPOMA OF RIGHT SHOULDER: Primary | ICD-10-CM

## 2025-04-21 PROCEDURE — 99283 EMERGENCY DEPT VISIT LOW MDM: CPT | Performed by: EMERGENCY MEDICINE

## 2025-04-21 PROCEDURE — 99282 EMERGENCY DEPT VISIT SF MDM: CPT

## 2025-04-21 NOTE — ED PROVIDER NOTES
Time reflects when diagnosis was documented in both MDM as applicable and the Disposition within this note       Time User Action Codes Description Comment    2025  9:16 AM Brisa Brito Add [D17.21] Lipoma of right shoulder           ED Disposition       ED Disposition   Discharge    Condition   Stable    Date/Time     9:16 AM    Comment   Albert Jensen discharge to home/self care.                   Assessment & Plan       Medical Decision Making  64 year old male presents for evaluation of right shoulder mass which has been present for more than 2 years.  Patient evaluated for this multiple times.  No signs of infection on exam.  Patient seen by general surgery as an outpatient, but has not followed up with pulmonology for his COPD or PCP for surgical clearance.  Patient again instructed to follow up with PCP and pulmonology at which time he can then follow up with general surgery as an outpatient to discuss surgical options for his shoulder mass.              Medications - No data to display    ED Risk Strat Scores                    No data recorded                            History of Present Illness       Chief Complaint   Patient presents with    Shoulder Pain     Pt reports having an ongoing lump in his right shoulder. Pt seen earlier in the week for same complaint       Past Medical History:   Diagnosis Date    Deafness in right ear     Hypertension       Past Surgical History:   Procedure Laterality Date    SPLENECTOMY, TOTAL        History reviewed. No pertinent family history.   Social History     Tobacco Use    Smoking status: Former     Current packs/day: 0.00     Types: Cigarettes     Quit date: 1987     Years since quittin.6    Smokeless tobacco: Never   Vaping Use    Vaping status: Never Used   Substance Use Topics    Alcohol use: Not Currently    Drug use: Not Currently      E-Cigarette/Vaping    E-Cigarette Use Never User       E-Cigarette/Vaping Substances     Nicotine No     THC No     CBD No     Flavoring No     Other No     Unknown No       I have reviewed and agree with the history as documented.     64 year old male presents for evaluation of right shoulder lump which has been present for at least 2 years.  Patient reports that it is uncomfortable and he is concerned that it is growing.  He states he wants to be admitted to the hospital.          Review of Systems        Objective       ED Triage Vitals [04/21/25 0915]   Temperature Pulse Blood Pressure Respirations SpO2 Patient Position - Orthostatic VS   98.1 °F (36.7 °C) 70 (!) 207/72 16 98 % Sitting      Temp Source Heart Rate Source BP Location FiO2 (%) Pain Score    Oral Monitor Right arm -- --      Vitals      Date and Time Temp Pulse SpO2 Resp BP Pain Score FACES Pain Rating User   04/21/25 0915 98.1 °F (36.7 °C) 70 98 % 16 207/72 -- -- MB            Physical Exam  Vitals and nursing note reviewed.   HENT:      Head: Normocephalic and atraumatic.   Cardiovascular:      Rate and Rhythm: Normal rate and regular rhythm.      Pulses: Normal pulses.   Pulmonary:      Effort: Pulmonary effort is normal. No respiratory distress.   Skin:     Comments: Soft mass overlying the right shoulder.  No erythema, induration or warmth to suggest infection.   Neurological:      Mental Status: He is alert.         Results Reviewed       None            No orders to display       Procedures    ED Medication and Procedure Management   Prior to Admission Medications   Prescriptions Last Dose Informant Patient Reported? Taking?   albuterol (2.5 mg/3 mL) 0.083 % nebulizer solution   No No   Sig: Take 3 mL (2.5 mg total) by nebulization every 6 (six) hours as needed for wheezing or shortness of breath   albuterol (Proventil HFA) 90 mcg/act inhaler   No No   Sig: Inhale 1-2 puffs every 4 (four) hours as needed for wheezing or shortness of breath   benzonatate (TESSALON PERLES) 100 mg capsule   No No   Sig: Take 1 capsule (100 mg total)  by mouth every 8 (eight) hours   budesonide-formoterol (Symbicort) 160-4.5 mcg/act inhaler   No No   Sig: Inhale 2 puffs 2 (two) times a day Rinse mouth after use.   metroNIDAZOLE (METROCREAM) 0.75 % cream   No No   Sig: Apply 1 Application topically 2 (two) times a day   predniSONE 20 mg tablet   No No   Sig: Take 2 tablets (40 mg total) by mouth daily for 3 days, THEN 1.5 tablets (30 mg total) daily for 3 days, THEN 1 tablet (20 mg total) daily for 3 days, THEN 0.5 tablets (10 mg total) daily for 3 days.      Facility-Administered Medications: None     Patient's Medications   Discharge Prescriptions    No medications on file     No discharge procedures on file.  ED SEPSIS DOCUMENTATION   Time reflects when diagnosis was documented in both MDM as applicable and the Disposition within this note       Time User Action Codes Description Comment    4/21/2025  9:16 AM Brisa Brito Add [D17.21] Lipoma of right shoulder                  Brisa Brito MD  04/21/25 0967

## 2025-04-21 NOTE — ED NOTES
"Patient reports he \"needs to be admitted\". Explained to patient that he needs to be evaluated prior to a decision to admit. Patient states he has discomfort because of his Lipoma. ER provider at bedside at this time to speak to patient as patient has been here before for same and given resources to get medically cleared for surgery to have lipoma removed. Patient states \"there is funding for this - I should be admitted\". Provider explained that funding is not a factor.      Rocio Watt RN  04/21/25 0918    "

## 2025-04-24 ENCOUNTER — TELEPHONE (OUTPATIENT)
Dept: SURGERY | Facility: HOSPITAL | Age: 65
End: 2025-04-24

## 2025-04-24 NOTE — TELEPHONE ENCOUNTER
Attempted to reach patient to cancel appointment for Friday 4/25/25.  No need at this time for patient to come in because surgery will not be able to be scheduled until he has gotten clearance from his PCP and Pulmonology.    Once these appointments and clearances are set up we can discuss a follow up appointment to schedule surgery as consult visit must be within 30 days of surgery date.

## 2025-05-08 ENCOUNTER — APPOINTMENT (EMERGENCY)
Dept: RADIOLOGY | Facility: HOSPITAL | Age: 65
End: 2025-05-08

## 2025-05-08 ENCOUNTER — HOSPITAL ENCOUNTER (EMERGENCY)
Facility: HOSPITAL | Age: 65
Discharge: HOME | End: 2025-05-08
Attending: EMERGENCY MEDICINE
Payer: MEDICARE

## 2025-05-08 VITALS
RESPIRATION RATE: 17 BRPM | OXYGEN SATURATION: 95 % | SYSTOLIC BLOOD PRESSURE: 128 MMHG | TEMPERATURE: 97.6 F | HEIGHT: 67 IN | WEIGHT: 205.69 LBS | BODY MASS INDEX: 32.28 KG/M2 | HEART RATE: 75 BPM | DIASTOLIC BLOOD PRESSURE: 62 MMHG

## 2025-05-08 DIAGNOSIS — J44.1 COPD EXACERBATION (HCC): Primary | ICD-10-CM

## 2025-05-08 LAB
2HR DELTA HS TROPONIN: -2 NG/L
ALBUMIN SERPL BCG-MCNC: 4.8 G/DL (ref 3.5–5)
ALP SERPL-CCNC: 103 U/L (ref 34–104)
ALT SERPL W P-5'-P-CCNC: 17 U/L (ref 7–52)
ANION GAP SERPL CALCULATED.3IONS-SCNC: 10 MMOL/L (ref 4–13)
AST SERPL W P-5'-P-CCNC: 16 U/L (ref 13–39)
ATRIAL RATE: 77 BPM
BASE EXCESS BLDA CALC-SCNC: 1 MMOL/L (ref -2–3)
BASOPHILS # BLD AUTO: 0.11 THOUSANDS/ÂΜL (ref 0–0.1)
BASOPHILS NFR BLD AUTO: 1 % (ref 0–1)
BILIRUB SERPL-MCNC: 0.51 MG/DL (ref 0.2–1)
BNP SERPL-MCNC: 48 PG/ML (ref 0–100)
BUN SERPL-MCNC: 17 MG/DL (ref 5–25)
CA-I BLD-SCNC: 1.11 MMOL/L (ref 1.12–1.32)
CALCIUM SERPL-MCNC: 9.3 MG/DL (ref 8.4–10.2)
CARDIAC TROPONIN I PNL SERPL HS: 4 NG/L (ref ?–50)
CARDIAC TROPONIN I PNL SERPL HS: 6 NG/L (ref ?–50)
CHLORIDE SERPL-SCNC: 100 MMOL/L (ref 96–108)
CO2 SERPL-SCNC: 27 MMOL/L (ref 21–32)
CREAT SERPL-MCNC: 0.8 MG/DL (ref 0.6–1.3)
EOSINOPHIL # BLD AUTO: 0.71 THOUSAND/ÂΜL (ref 0–0.61)
EOSINOPHIL NFR BLD AUTO: 6 % (ref 0–6)
ERYTHROCYTE [DISTWIDTH] IN BLOOD BY AUTOMATED COUNT: 13.8 % (ref 11.6–15.1)
FLUAV AG UPPER RESP QL IA.RAPID: NEGATIVE
FLUBV AG UPPER RESP QL IA.RAPID: NEGATIVE
GFR SERPL CREATININE-BSD FRML MDRD: 93 ML/MIN/1.73SQ M
GLUCOSE SERPL-MCNC: 127 MG/DL (ref 65–140)
GLUCOSE SERPL-MCNC: 134 MG/DL (ref 65–140)
HCO3 BLDA-SCNC: 25.6 MMOL/L (ref 24–30)
HCT VFR BLD AUTO: 46.6 % (ref 36.5–49.3)
HCT VFR BLD CALC: 44 % (ref 36.5–49.3)
HGB BLD-MCNC: 15.7 G/DL (ref 12–17)
HGB BLDA-MCNC: 15 G/DL (ref 12–17)
IMM GRANULOCYTES # BLD AUTO: 0.11 THOUSAND/UL (ref 0–0.2)
IMM GRANULOCYTES NFR BLD AUTO: 1 % (ref 0–2)
LYMPHOCYTES # BLD AUTO: 3.5 THOUSANDS/ÂΜL (ref 0.6–4.47)
LYMPHOCYTES NFR BLD AUTO: 27 % (ref 14–44)
MCH RBC QN AUTO: 32 PG (ref 26.8–34.3)
MCHC RBC AUTO-ENTMCNC: 33.7 G/DL (ref 31.4–37.4)
MCV RBC AUTO: 95 FL (ref 82–98)
MONOCYTES # BLD AUTO: 1.15 THOUSAND/ÂΜL (ref 0.17–1.22)
MONOCYTES NFR BLD AUTO: 9 % (ref 4–12)
NEUTROPHILS # BLD AUTO: 7.34 THOUSANDS/ÂΜL (ref 1.85–7.62)
NEUTS SEG NFR BLD AUTO: 56 % (ref 43–75)
NRBC BLD AUTO-RTO: 0 /100 WBCS
P AXIS: 88 DEGREES
PCO2 BLD: 27 MMOL/L (ref 21–32)
PCO2 BLD: 41.6 MM HG (ref 42–50)
PH BLD: 7.4 [PH] (ref 7.3–7.4)
PLATELET # BLD AUTO: 356 THOUSANDS/UL (ref 149–390)
PMV BLD AUTO: 10.2 FL (ref 8.9–12.7)
PO2 BLD: 60 MM HG (ref 35–45)
POTASSIUM BLD-SCNC: 3.3 MMOL/L (ref 3.5–5.3)
POTASSIUM SERPL-SCNC: 3.8 MMOL/L (ref 3.5–5.3)
PR INTERVAL: 166 MS
PROT SERPL-MCNC: 7.9 G/DL (ref 6.4–8.4)
QRS AXIS: 78 DEGREES
QRSD INTERVAL: 74 MS
QT INTERVAL: 410 MS
QTC INTERVAL: 463 MS
RBC # BLD AUTO: 4.91 MILLION/UL (ref 3.88–5.62)
SAO2 % BLD FROM PO2: 91 % (ref 60–85)
SARS-COV+SARS-COV-2 AG RESP QL IA.RAPID: NEGATIVE
SODIUM BLD-SCNC: 139 MMOL/L (ref 136–145)
SODIUM SERPL-SCNC: 137 MMOL/L (ref 135–147)
SPECIMEN SOURCE: ABNORMAL
T WAVE AXIS: 84 DEGREES
VENTRICULAR RATE: 77 BPM
WBC # BLD AUTO: 12.92 THOUSAND/UL (ref 4.31–10.16)

## 2025-05-08 PROCEDURE — 82330 ASSAY OF CALCIUM: CPT

## 2025-05-08 PROCEDURE — 71045 X-RAY EXAM CHEST 1 VIEW: CPT

## 2025-05-08 PROCEDURE — 94644 CONT INHLJ TX 1ST HOUR: CPT

## 2025-05-08 PROCEDURE — 80053 COMPREHEN METABOLIC PANEL: CPT

## 2025-05-08 PROCEDURE — 94760 N-INVAS EAR/PLS OXIMETRY 1: CPT

## 2025-05-08 PROCEDURE — 83880 ASSAY OF NATRIURETIC PEPTIDE: CPT | Performed by: EMERGENCY MEDICINE

## 2025-05-08 PROCEDURE — 99285 EMERGENCY DEPT VISIT HI MDM: CPT | Performed by: EMERGENCY MEDICINE

## 2025-05-08 PROCEDURE — 85025 COMPLETE CBC W/AUTO DIFF WBC: CPT

## 2025-05-08 PROCEDURE — 85014 HEMATOCRIT: CPT

## 2025-05-08 PROCEDURE — 84484 ASSAY OF TROPONIN QUANT: CPT

## 2025-05-08 PROCEDURE — 96375 TX/PRO/DX INJ NEW DRUG ADDON: CPT

## 2025-05-08 PROCEDURE — 82803 BLOOD GASES ANY COMBINATION: CPT

## 2025-05-08 PROCEDURE — 93010 ELECTROCARDIOGRAM REPORT: CPT | Performed by: INTERNAL MEDICINE

## 2025-05-08 PROCEDURE — 82947 ASSAY GLUCOSE BLOOD QUANT: CPT

## 2025-05-08 PROCEDURE — 87804 INFLUENZA ASSAY W/OPTIC: CPT | Performed by: EMERGENCY MEDICINE

## 2025-05-08 PROCEDURE — 99285 EMERGENCY DEPT VISIT HI MDM: CPT

## 2025-05-08 PROCEDURE — 87811 SARS-COV-2 COVID19 W/OPTIC: CPT | Performed by: EMERGENCY MEDICINE

## 2025-05-08 PROCEDURE — 96365 THER/PROPH/DIAG IV INF INIT: CPT

## 2025-05-08 PROCEDURE — 84132 ASSAY OF SERUM POTASSIUM: CPT

## 2025-05-08 PROCEDURE — 93005 ELECTROCARDIOGRAM TRACING: CPT

## 2025-05-08 PROCEDURE — 36415 COLL VENOUS BLD VENIPUNCTURE: CPT

## 2025-05-08 PROCEDURE — 84295 ASSAY OF SERUM SODIUM: CPT

## 2025-05-08 RX ORDER — ALBUTEROL SULFATE 90 UG/1
1-2 INHALANT RESPIRATORY (INHALATION) EVERY 6 HOURS PRN
Qty: 18 G | Refills: 0 | Status: SHIPPED | OUTPATIENT
Start: 2025-05-08

## 2025-05-08 RX ORDER — ALBUTEROL SULFATE 5 MG/ML
10 SOLUTION RESPIRATORY (INHALATION) ONCE
Status: COMPLETED | OUTPATIENT
Start: 2025-05-08 | End: 2025-05-08

## 2025-05-08 RX ORDER — METHYLPREDNISOLONE SODIUM SUCCINATE 125 MG/2ML
80 INJECTION, POWDER, LYOPHILIZED, FOR SOLUTION INTRAMUSCULAR; INTRAVENOUS ONCE
Status: COMPLETED | OUTPATIENT
Start: 2025-05-08 | End: 2025-05-08

## 2025-05-08 RX ORDER — MAGNESIUM SULFATE HEPTAHYDRATE 40 MG/ML
2 INJECTION, SOLUTION INTRAVENOUS ONCE
Status: COMPLETED | OUTPATIENT
Start: 2025-05-08 | End: 2025-05-08

## 2025-05-08 RX ORDER — SODIUM CHLORIDE FOR INHALATION 0.9 %
12 VIAL, NEBULIZER (ML) INHALATION ONCE
Status: COMPLETED | OUTPATIENT
Start: 2025-05-08 | End: 2025-05-08

## 2025-05-08 RX ORDER — PREDNISONE 20 MG/1
40 TABLET ORAL DAILY
Qty: 8 TABLET | Refills: 0 | Status: SHIPPED | OUTPATIENT
Start: 2025-05-08 | End: 2025-05-09 | Stop reason: SDDI

## 2025-05-08 RX ADMIN — MAGNESIUM SULFATE HEPTAHYDRATE 2 G: 2 INJECTION, SOLUTION INTRAVENOUS at 01:48

## 2025-05-08 RX ADMIN — IPRATROPIUM BROMIDE 1 MG: 0.5 SOLUTION RESPIRATORY (INHALATION) at 01:21

## 2025-05-08 RX ADMIN — METHYLPREDNISOLONE SODIUM SUCCINATE 80 MG: 125 INJECTION, POWDER, FOR SOLUTION INTRAMUSCULAR; INTRAVENOUS at 02:53

## 2025-05-08 RX ADMIN — ALBUTEROL SULFATE 10 MG: 2.5 SOLUTION RESPIRATORY (INHALATION) at 01:21

## 2025-05-08 RX ADMIN — ISODIUM CHLORIDE 12 ML: 0.03 SOLUTION RESPIRATORY (INHALATION) at 01:21

## 2025-05-08 NOTE — DISCHARGE INSTRUCTIONS
Follow-up with your primary care provider and pulmonology for further care, if symptoms worsen please return to the emergency department

## 2025-05-08 NOTE — ED PROVIDER NOTES
Time reflects when diagnosis was documented in both MDM as applicable and the Disposition within this note       Time User Action Codes Description Comment    5/8/2025  3:37 AM Santa Doherty Add [J44.1] COPD exacerbation (HCC)           ED Disposition       ED Disposition   Discharge    Condition   Stable    Date/Time   Thu May 8, 2025  3:37 AM    Comment   Albert Jensen discharge to home/self care.                   Assessment & Plan       Medical Decision Making  64-year-old male with bronchospasm, wheezing, differential diagnosis includes bronchospasm, reactive airway disease, at risk for pneumonia, viral upper respiratory infection, no history of CHF however may be a component of fluid overload, pulmonary edema as patient is significantly hypertensive  Other Considerations ACS, arrhythmia    Amount and/or Complexity of Data Reviewed  Labs: ordered.  Radiology: ordered and independent interpretation performed.    Risk  Prescription drug management.        ED Course as of 05/08/25 0338   Thu May 08, 2025   0058 Patient with multiple visits to the ER with shortness of breath and wheezing, most recent admission for observation 4/8/25 for bronchospasm, wheezing, patient was supposed to follow-up as an outpatient with pulmonary function testing which he has not done so, has been taking steroids intermittently when they were prescribed to him, was discharged on steroid pack   0221 Procedure Note: EKG  Date/Time: 05/08/25 2:21 AM   Performed by: SANTA DOHERTY  Authorized by: SANTA DOHERTY  Indications / Diagnosis: CP  ECG reviewed by me, the ED Provider: yes   The EKG demonstrates:  Rhythm: normal sinus  Intervals:normal intervals  Axis: normal axis  QRS/Blocks: normal QRS  ST Changes: No acute ST Changes, no STD/MONTY.       0223 Blood Pressure(!): 220/106  Resolved without any vasoactive medication, now mildly elevated with no evidence of pulmonary edema on chest x-ray does not appear to be in acute fluid overload   0247 No  acute distress resting comfortably   0338 Patient in no acute distress, chronic wheezing with otherwise unremarkable workup no pneumonia, no respiratory acidosis no focal infiltrate on x-ray no fevers, doubt pneumonia, otherwise stable for discharge no indication of further patient evaluation and treatment       Medications   albuterol inhalation solution 10 mg (10 mg Nebulization Given 5/8/25 0121)   ipratropium (ATROVENT) 0.02 % inhalation solution 1 mg (1 mg Nebulization Given 5/8/25 0121)   sodium chloride 0.9 % inhalation solution 12 mL (12 mL Nebulization Given 5/8/25 0121)   magnesium sulfate 2 g/50 mL IVPB (premix) 2 g (0 g Intravenous Stopped 5/8/25 0252)   methylPREDNISolone sodium succinate (Solu-MEDROL) injection 80 mg (80 mg Intravenous Given 5/8/25 0253)       ED Risk Strat Scores                    No data recorded        SBIRT 20yo+      Flowsheet Row Most Recent Value   Initial Alcohol Screen: US AUDIT-C     1. How often do you have a drink containing alcohol? 0 Filed at: 05/08/2025 0142   2. How many drinks containing alcohol do you have on a typical day you are drinking?  0 Filed at: 05/08/2025 0142   3a. Male UNDER 65: How often do you have five or more drinks on one occasion? 0 Filed at: 05/08/2025 0142   3b. FEMALE Any Age, or MALE 65+: How often do you have 4 or more drinks on one occassion? 0 Filed at: 05/08/2025 0142   Audit-C Score 0 Filed at: 05/08/2025 0142   ANJELICA: How many times in the past year have you...    Used an illegal drug or used a prescription medication for non-medical reasons? Never Filed at: 05/08/2025 0142                            History of Present Illness       Chief Complaint   Patient presents with    Shortness of Breath     Pt states 2 day Hx of dyspnea.       Past Medical History:   Diagnosis Date    Deafness in right ear     Hypertension       Past Surgical History:   Procedure Laterality Date    SPLENECTOMY, TOTAL        History reviewed. No pertinent family  history.   Social History     Tobacco Use    Smoking status: Former     Current packs/day: 0.00     Types: Cigarettes     Quit date: 1987     Years since quittin.6    Smokeless tobacco: Never   Vaping Use    Vaping status: Never Used   Substance Use Topics    Alcohol use: Not Currently    Drug use: Not Currently      E-Cigarette/Vaping    E-Cigarette Use Never User       E-Cigarette/Vaping Substances    Nicotine No     THC No     CBD No     Flavoring No     Other No     Unknown No       I have reviewed and agree with the history as documented.     64-year-old male with ongoing history of bronchospasm with no clear etiology not a smoker presents for evaluation of worsening shortness of breath and wheezing for the last couple days, no fevers, no chills, no chest pain, no orthopnea no lower extremity swelling.  Patient has plans to follow-up with pulmonology with PFTs scheduled in [place] currently he is not on steroids        Review of Systems   Constitutional:  Negative for appetite change, chills and fever.   HENT:  Negative for rhinorrhea and sore throat.    Eyes:  Negative for photophobia and visual disturbance.   Respiratory:  Positive for shortness of breath and wheezing. Negative for cough.    Cardiovascular:  Negative for chest pain and palpitations.   Gastrointestinal:  Negative for abdominal pain and diarrhea.   Genitourinary:  Negative for dysuria, frequency and urgency.   Skin:  Negative for rash.   Neurological:  Negative for dizziness and weakness.   All other systems reviewed and are negative.          Objective       ED Triage Vitals   Temperature Pulse Blood Pressure Respirations SpO2 Patient Position - Orthostatic VS   257 257 257 257 25   97.6 °F (36.4 °C) 86 (!) 220/106 22 97 % Sitting      Temp Source Heart Rate Source BP Location FiO2 (%) Pain Score    257 25 0200 25 -- 25    Temporal  Monitor Left arm  7      Vitals      Date and Time Temp Pulse SpO2 Resp BP Pain Score FACES Pain Rating User   05/08/25 0315 -- 75 95 % 17 128/62 -- --    05/08/25 0300 -- 80 97 % 18 135/62 -- --    05/08/25 0200 -- 67 100 % 18 167/74 -- --    05/08/25 0148 -- -- -- -- 169/78 -- --    05/08/25 0047 97.6 °F (36.4 °C) 86 97 % 22 220/106 7 -- WM            Physical Exam  Vitals and nursing note reviewed.   Constitutional:       Appearance: He is well-developed.   HENT:      Head: Normocephalic and atraumatic.      Right Ear: External ear normal.      Left Ear: External ear normal.   Eyes:      Conjunctiva/sclera: Conjunctivae normal.      Pupils: Pupils are equal, round, and reactive to light.   Neck:      Vascular: No JVD.      Trachea: No tracheal deviation.   Cardiovascular:      Rate and Rhythm: Normal rate and regular rhythm.      Heart sounds: Normal heart sounds. No murmur heard.     No friction rub. No gallop.   Pulmonary:      Effort: Pulmonary effort is normal. No respiratory distress.      Breath sounds: No stridor. Decreased breath sounds and wheezing present. No rales.   Abdominal:      General: There is no distension.      Palpations: Abdomen is soft. There is no mass.      Tenderness: There is no abdominal tenderness. There is no guarding or rebound.   Musculoskeletal:         General: Normal range of motion.      Cervical back: Normal range of motion and neck supple.      Right lower leg: No edema.      Left lower leg: No edema.   Skin:     General: Skin is warm and dry.      Coloration: Skin is not pale.      Findings: No erythema or rash.   Neurological:      Mental Status: He is alert and oriented to person, place, and time.      Cranial Nerves: No cranial nerve deficit.         Results Reviewed       Procedure Component Value Units Date/Time    HS Troponin I 2hr [[id number]]  (Normal) Collected: 05/08/25 0254    Lab Status: Final result Specimen: Blood from Arm, Left Updated: 05/08/25 0811      hs TnI 2hr 4 ng/L      Delta 2hr hsTnI -2 ng/L     FLU/COVID Rapid Antigen (30 min. TAT) - Preferred screening test in ED [[id number]]  (Normal) Collected: 05/08/25 0115    Lab Status: Final result Specimen: Nares from Nose Updated: 05/08/25 0140     SARS COV Rapid Antigen Negative     Influenza A Rapid Antigen Negative     Influenza B Rapid Antigen Negative    Narrative:      This test has been performed using the [employer] Di 2 FLU+SARS Antigen test under the Emergency Use Authorization (EUA). This test has been validated by the  and verified by the performing laboratory. The Di uses lateral flow immunofluorescent sandwich assay to detect SARS-COV, Influenza A and Influenza B Antigen.     The [employer] Di 2 SARS Antigen test does not differentiate between SARS-CoV and SARS-CoV-2.     Negative results are presumptive and may be confirmed with a molecular assay, if necessary, for patient management. Negative results do not rule out SARS-CoV-2 or influenza infection and should not be used as the sole basis for treatment or patient management decisions. A negative test result may occur if the level of antigen in a sample is below the limit of detection of this test.     Positive results are indicative of the presence of viral antigens, but do not rule out bacterial infection or co-infection with other viruses.     All test results should be used as an adjunct to clinical observations and other information available to the provider.    FOR PEDIATRIC PATIENTS - copy/paste COVID Guidelines URL to browser: [email or web address]    POCT Blood Gas (CG8+) [[id number]]  (Abnormal) Collected: 05/08/25 0131    Lab Status: Final result Specimen: Venous Updated: 05/08/25 0137     ph, Pablo ISTAT 7.398     pCO2, Pablo i-STAT 41.6 mm HG      pO2, Pablo i-STAT 60.0 mm HG      BE, i-STAT 1 mmol/L      HCO3, Pablo i-STAT 25.6 mmol/L      CO2, i-STAT 27 mmol/L      O2 Sat,  i-STAT 91 %      SODIUM, I-STAT 139 mmol/l      Potassium, i-STAT 3.3 mmol/L      Calcium, Ionized i-STAT 1.11 mmol/L      Hct, i-STAT 44 %      Hgb, i-STAT 15.0 g/dl      Glucose, i-STAT 134 mg/dl      Specimen Type VENOUS    B-Type Natriuretic Peptide(BNP) [[id number]]  (Normal) Collected: 05/08/25 0059    Lab Status: Final result Specimen: Blood from Arm, Left Updated: 05/08/25 0135     BNP 48 pg/mL     HS Troponin 0hr (reflex protocol) [[id number]]  (Normal) Collected: 05/08/25 0059    Lab Status: Final result Specimen: Blood from Arm, Left Updated: 05/08/25 0128     hs TnI 0hr 6 ng/L     Comprehensive metabolic panel [[id number]] Collected: 05/08/25 0059    Lab Status: Final result Specimen: Blood from Arm, Left Updated: 05/08/25 0119     Sodium 137 mmol/L      Potassium 3.8 mmol/L      Chloride 100 mmol/L      CO2 27 mmol/L      ANION GAP 10 mmol/L      BUN 17 mg/dL      Creatinine 0.80 mg/dL      Glucose 127 mg/dL      Calcium 9.3 mg/dL      AST 16 U/L      ALT 17 U/L      Alkaline Phosphatase 103 U/L      Total Protein 7.9 g/dL      Albumin 4.8 g/dL      Total Bilirubin 0.51 mg/dL      eGFR 93 ml/min/1.73sq m     Narrative:      National Kidney Disease Foundation guidelines for Chronic Kidney Disease (CKD):     Stage 1 with normal or high GFR (GFR > 90 mL/min/1.73 square meters)    Stage 2 Mild CKD (GFR = 60-89 mL/min/1.73 square meters)    Stage 3A Moderate CKD (GFR = 45-59 mL/min/1.73 square meters)    Stage 3B Moderate CKD (GFR = 30-44 mL/min/1.73 square meters)    Stage 4 Severe CKD (GFR = 15-29 mL/min/1.73 square meters)    Stage 5 End Stage CKD (GFR <15 mL/min/1.73 square meters)  Note: GFR calculation is accurate only with a steady state creatinine    CBC and differential [[id number]]  (Abnormal) Collected: 05/08/25 0059    Lab Status: Final result Specimen: Blood from Arm, Left Updated: 05/08/25 0106     WBC 12.92 Thousand/uL      RBC 4.91 Million/uL      Hemoglobin 15.7 g/dL      Hematocrit 46.6 %       MCV 95 fL      MCH 32.0 pg      MCHC 33.7 g/dL      RDW 13.8 %      MPV 10.2 fL      Platelets 356 Thousands/uL      nRBC 0 /100 WBCs      Segmented % 56 %      Immature Grans % 1 %      Lymphocytes % 27 %      Monocytes % 9 %      Eosinophils Relative 6 %      Basophils Relative 1 %      Absolute Neutrophils 7.34 Thousands/µL      Absolute Immature Grans 0.11 Thousand/uL      Absolute Lymphocytes 3.50 Thousands/µL      Absolute Monocytes 1.15 Thousand/µL      Eosinophils Absolute 0.71 Thousand/µL      Basophils Absolute 0.11 Thousands/µL             XR chest portable   ED Interpretation by Chelsie Mena DO (05/08 0221)   This study was ordered and independently reviewed by me    No acute findings noted             Procedures    ED Medication and Procedure Management   Prior to Admission Medications   Prescriptions Last Dose Informant Patient Reported? Taking?   albuterol (2.5 mg/3 mL) 0.083 % nebulizer solution   No No   Sig: Take 3 mL (2.5 mg total) by nebulization every 6 (six) hours as needed for wheezing or shortness of breath   albuterol (Proventil HFA) 90 mcg/act inhaler   No No   Sig: Inhale 1-2 puffs every 4 (four) hours as needed for wheezing or shortness of breath   benzonatate (TESSALON PERLES) 100 mg capsule   No No   Sig: Take 1 capsule (100 mg total) by mouth every 8 (eight) hours   budesonide-formoterol (Symbicort) 160-4.5 mcg/act inhaler   No No   Sig: Inhale 2 puffs 2 (two) times a day Rinse mouth after use.   metroNIDAZOLE (METROCREAM) 0.75 % cream   No No   Sig: Apply 1 Application topically 2 (two) times a day      Facility-Administered Medications: None     Patient's Medications   Discharge Prescriptions    ALBUTEROL (PROVENTIL HFA) 90 MCG/ACT INHALER    Inhale 1-2 puffs every 6 (six) hours as needed for wheezing       Start Date: 5/8/2025  End Date: --       Order Dose: 1-2 puffs       Quantity: 18 g    Refills: 0    PREDNISONE 20 MG TABLET    Take 2 tablets (40 mg total) by mouth daily        Start Date: 5/8/2025  End Date: --       Order Dose: 40 mg       Quantity: 8 tablet    Refills: 0     No discharge procedures on file.  ED SEPSIS DOCUMENTATION   Time reflects when diagnosis was documented in both MDM as applicable and the Disposition within this note       Time User Action Codes Description Comment    5/8/2025  3:37 AM Chelsie Mena [J44.1] COPD exacerbation (HCC)                  Chelsie Mena DO  05/08/25 0338

## 2025-05-09 ENCOUNTER — APPOINTMENT (EMERGENCY)
Dept: RADIOLOGY | Facility: HOSPITAL | Age: 65
End: 2025-05-09

## 2025-05-09 ENCOUNTER — HOSPITAL ENCOUNTER (EMERGENCY)
Facility: HOSPITAL | Age: 65
Discharge: HOME | End: 2025-05-09
Attending: EMERGENCY MEDICINE

## 2025-05-09 VITALS
HEART RATE: 72 BPM | SYSTOLIC BLOOD PRESSURE: 181 MMHG | TEMPERATURE: 98.2 F | OXYGEN SATURATION: 94 % | RESPIRATION RATE: 18 BRPM | DIASTOLIC BLOOD PRESSURE: 95 MMHG

## 2025-05-09 DIAGNOSIS — J44.1 COPD EXACERBATION (HCC): Primary | ICD-10-CM

## 2025-05-09 PROCEDURE — 71046 X-RAY EXAM CHEST 2 VIEWS: CPT

## 2025-05-09 PROCEDURE — 94640 AIRWAY INHALATION TREATMENT: CPT

## 2025-05-09 PROCEDURE — 99284 EMERGENCY DEPT VISIT MOD MDM: CPT | Performed by: EMERGENCY MEDICINE

## 2025-05-09 PROCEDURE — 99283 EMERGENCY DEPT VISIT LOW MDM: CPT

## 2025-05-09 RX ORDER — METHYLPREDNISOLONE 4 MG/1
32 TABLET ORAL ONCE
Status: COMPLETED | OUTPATIENT
Start: 2025-05-09 | End: 2025-05-09

## 2025-05-09 RX ORDER — ALBUTEROL SULFATE 5 MG/ML
5 SOLUTION RESPIRATORY (INHALATION) ONCE
Status: COMPLETED | OUTPATIENT
Start: 2025-05-09 | End: 2025-05-09

## 2025-05-09 RX ORDER — METHYLPREDNISOLONE 4 MG/1
TABLET ORAL
Qty: 21 TABLET | Refills: 0 | Status: SHIPPED | OUTPATIENT
Start: 2025-05-09

## 2025-05-09 RX ADMIN — ALBUTEROL SULFATE 5 MG: 2.5 SOLUTION RESPIRATORY (INHALATION) at 21:50

## 2025-05-09 RX ADMIN — METHYLPREDNISOLONE 4 MG: 4 TABLET ORAL at 21:57

## 2025-05-09 RX ADMIN — IPRATROPIUM BROMIDE 0.5 MG: 0.5 SOLUTION RESPIRATORY (INHALATION) at 21:50

## 2025-05-10 NOTE — ED PROVIDER NOTES
Time reflects when diagnosis was documented in both MDM as applicable and the Disposition within this note       Time User Action Codes Description Comment    5/9/2025  9:54 PM Brisa Brito [J44.1] COPD exacerbation (HCC)           ED Disposition       ED Disposition   Discharge    Condition   Stable    Date/Time   Fri May 9, 2025  9:55 PM    Comment   Albert Jensen discharge to home/self care.                   Assessment & Plan       Medical Decision Making  64 year old male with history of COPD presents for evaluation of cough and shortness of breath.  Patient has not yet had an appointment with pulmonology.  He was seen yesterday and treated for COPD exacerbation.  He received a dose of solumedrol yesterday and was discharged on prednisone and albuterol.  No infiltrate on portable CXR yesterday.  2-view xray ordered to r/o retrocardiac infiltrate which was found to be negative.  No current supplemental oxygen requirement.  Nebulizer treatment and dose of methylprednisolone provided.  Prednisone prescription changed to medrol dose pack.  Patient to follow up with pulmonology.  No indication for emergent treatment for chronic right shoulder mass.    Amount and/or Complexity of Data Reviewed  Radiology: ordered and independent interpretation performed.    Risk  Prescription drug management.             Medications   methylprednisolone (MEDROL) tablet 32 mg (has no administration in time range)   ipratropium (ATROVENT) 0.02 % inhalation solution 0.5 mg (0.5 mg Nebulization Given 5/9/25 2150)   albuterol inhalation solution 5 mg (5 mg Nebulization Given 5/9/25 2150)       ED Risk Strat Scores                    No data recorded        SBIRT 20yo+      Flowsheet Row Most Recent Value   Initial Alcohol Screen: US AUDIT-C     1. How often do you have a drink containing alcohol? 0 Filed at: 05/09/2025 1938   2. How many drinks containing alcohol do you have on a typical day you are drinking?  0 Filed at:  2025   3a. Male UNDER 65: How often do you have five or more drinks on one occasion? 0 Filed at: 2025   3b. FEMALE Any Age, or MALE 65+: How often do you have 4 or more drinks on one occassion? 0 Filed at: 2025   Audit-C Score 0 Filed at: 2025   ANJELICA: How many times in the past year have you...    Used an illegal drug or used a prescription medication for non-medical reasons? Never Filed at: 2025                            History of Present Illness       Chief Complaint   Patient presents with    Homeless     Pt states his living condition is not sanitary and he cannot be at home and needs to be in the hospital. Pt did state he has right shoulder discomfort.        Past Medical History:   Diagnosis Date    Deafness in right ear     Hypertension       Past Surgical History:   Procedure Laterality Date    SPLENECTOMY, TOTAL        History reviewed. No pertinent family history.   Social History     Tobacco Use    Smoking status: Former     Current packs/day: 0.00     Types: Cigarettes     Quit date: 1987     Years since quittin.6    Smokeless tobacco: Never   Vaping Use    Vaping status: Never Used   Substance Use Topics    Alcohol use: Not Currently    Drug use: Not Currently      E-Cigarette/Vaping    E-Cigarette Use Never User       E-Cigarette/Vaping Substances    Nicotine No     THC No     CBD No     Flavoring No     Other No     Unknown No       I have reviewed and agree with the history as documented.     64 year old male presents for evaluation of shortness of breath and cough productive of green sputum.  Patient was seen yesterday for similar complaint, but feels he does not have COPD and that he needs antibiotics because his poor living conditions are causing infections.  He denies fevers or chills.  He did not  the prednisone prescription as he says he does not want to take prednisone and will only take methylprednisolone.  He is also  wanting his right shoulder mass removed as it is continuing to bother him.          Review of Systems        Objective       ED Triage Vitals   Temperature Pulse Blood Pressure Respirations SpO2 Patient Position - Orthostatic VS   05/09/25 1936 05/09/25 1938 05/09/25 1938 05/09/25 1938 05/09/25 1938 --   98.2 °F (36.8 °C) 72 (!) 181/95 18 94 %       Temp Source Heart Rate Source BP Location FiO2 (%) Pain Score    05/09/25 1936 05/09/25 1938 -- -- --    Oral Monitor         Vitals      Date and Time Temp Pulse SpO2 Resp BP Pain Score FACES Pain Rating User   05/09/25 1938 -- 72 94 % 18 181/95 -- -- MH   05/09/25 1936 98.2 °F (36.8 °C) -- -- -- -- -- --             Physical Exam  Vitals and nursing note reviewed.   Cardiovascular:      Rate and Rhythm: Normal rate and regular rhythm.      Pulses: Normal pulses.   Pulmonary:      Effort: Pulmonary effort is normal.      Breath sounds: Wheezing and rhonchi present.   Abdominal:      General: There is no distension.      Palpations: Abdomen is soft.   Skin:     General: Skin is warm and dry.   Neurological:      Mental Status: He is alert.         Results Reviewed       None            XR chest 2 views   ED Interpretation by Brisa Brito MD (05/09 2155)   No acute pulmonary pathology          Procedures    ED Medication and Procedure Management   Prior to Admission Medications   Prescriptions Last Dose Informant Patient Reported? Taking?   albuterol (2.5 mg/3 mL) 0.083 % nebulizer solution   No No   Sig: Take 3 mL (2.5 mg total) by nebulization every 6 (six) hours as needed for wheezing or shortness of breath   albuterol (Proventil HFA) 90 mcg/act inhaler   No No   Sig: Inhale 1-2 puffs every 4 (four) hours as needed for wheezing or shortness of breath   albuterol (Proventil HFA) 90 mcg/act inhaler   No No   Sig: Inhale 1-2 puffs every 6 (six) hours as needed for wheezing   benzonatate (TESSALON PERLES) 100 mg capsule   No No   Sig: Take 1 capsule (100 mg  total) by mouth every 8 (eight) hours   budesonide-formoterol (Symbicort) 160-4.5 mcg/act inhaler   No No   Sig: Inhale 2 puffs 2 (two) times a day Rinse mouth after use.   metroNIDAZOLE (METROCREAM) 0.75 % cream   No No   Sig: Apply 1 Application topically 2 (two) times a day   predniSONE 20 mg tablet   No No   Sig: Take 2 tablets (40 mg total) by mouth daily      Facility-Administered Medications: None     Patient's Medications   Discharge Prescriptions    METHYLPREDNISOLONE 4 MG TABLET THERAPY PACK    Use as directed on package       Start Date: 5/9/2025  End Date: --       Order Dose: --       Quantity: 21 tablet    Refills: 0     No discharge procedures on file.  ED SEPSIS DOCUMENTATION   Time reflects when diagnosis was documented in both MDM as applicable and the Disposition within this note       Time User Action Codes Description Comment    5/9/2025  9:54 PM Brisa Brito [J44.1] COPD exacerbation (HCC)                  Brisa Brito MD  05/09/25 7401

## 2025-05-12 ENCOUNTER — APPOINTMENT (OUTPATIENT)
Dept: RADIOLOGY | Facility: HOSPITAL | Age: 65
End: 2025-05-12

## 2025-05-12 ENCOUNTER — HOSPITAL ENCOUNTER (EMERGENCY)
Facility: HOSPITAL | Age: 65
Discharge: HOME HEALTH | End: 2025-05-12
Attending: EMERGENCY MEDICINE

## 2025-05-12 VITALS
HEART RATE: 71 BPM | DIASTOLIC BLOOD PRESSURE: 91 MMHG | SYSTOLIC BLOOD PRESSURE: 163 MMHG | RESPIRATION RATE: 18 BRPM | OXYGEN SATURATION: 98 % | TEMPERATURE: 98.1 F

## 2025-05-12 DIAGNOSIS — D17.21 LIPOMA OF RIGHT UPPER EXTREMITY: ICD-10-CM

## 2025-05-12 DIAGNOSIS — J20.9 ACUTE BRONCHITIS: Primary | ICD-10-CM

## 2025-05-12 LAB
2HR DELTA HS TROPONIN: -2 NG/L
ALBUMIN SERPL BCG-MCNC: 4.3 G/DL (ref 3.5–5)
ALP SERPL-CCNC: 85 U/L (ref 34–104)
ALT SERPL W P-5'-P-CCNC: 14 U/L (ref 7–52)
ANION GAP SERPL CALCULATED.3IONS-SCNC: 9 MMOL/L (ref 4–13)
AST SERPL W P-5'-P-CCNC: 13 U/L (ref 13–39)
BASOPHILS # BLD AUTO: 0.09 THOUSANDS/ÂΜL (ref 0–0.1)
BASOPHILS NFR BLD AUTO: 1 % (ref 0–1)
BILIRUB SERPL-MCNC: 0.4 MG/DL (ref 0.2–1)
BUN SERPL-MCNC: 26 MG/DL (ref 5–25)
CALCIUM SERPL-MCNC: 9.8 MG/DL (ref 8.4–10.2)
CARDIAC TROPONIN I PNL SERPL HS: 4 NG/L (ref ?–50)
CARDIAC TROPONIN I PNL SERPL HS: 6 NG/L (ref ?–50)
CHLORIDE SERPL-SCNC: 103 MMOL/L (ref 96–108)
CO2 SERPL-SCNC: 27 MMOL/L (ref 21–32)
CREAT SERPL-MCNC: 0.95 MG/DL (ref 0.6–1.3)
EOSINOPHIL # BLD AUTO: 0.36 THOUSAND/ÂΜL (ref 0–0.61)
EOSINOPHIL NFR BLD AUTO: 3 % (ref 0–6)
ERYTHROCYTE [DISTWIDTH] IN BLOOD BY AUTOMATED COUNT: 14.2 % (ref 11.6–15.1)
GFR SERPL CREATININE-BSD FRML MDRD: 83 ML/MIN/1.73SQ M
GLUCOSE SERPL-MCNC: 98 MG/DL (ref 65–140)
HCT VFR BLD AUTO: 44.7 % (ref 36.5–49.3)
HGB BLD-MCNC: 15.1 G/DL (ref 12–17)
IMM GRANULOCYTES # BLD AUTO: 0.04 THOUSAND/UL (ref 0–0.2)
IMM GRANULOCYTES NFR BLD AUTO: 0 % (ref 0–2)
LYMPHOCYTES # BLD AUTO: 3.06 THOUSANDS/ÂΜL (ref 0.6–4.47)
LYMPHOCYTES NFR BLD AUTO: 27 % (ref 14–44)
MCH RBC QN AUTO: 32.3 PG (ref 26.8–34.3)
MCHC RBC AUTO-ENTMCNC: 33.8 G/DL (ref 31.4–37.4)
MCV RBC AUTO: 96 FL (ref 82–98)
MONOCYTES # BLD AUTO: 1.33 THOUSAND/ÂΜL (ref 0.17–1.22)
MONOCYTES NFR BLD AUTO: 12 % (ref 4–12)
NEUTROPHILS # BLD AUTO: 6.5 THOUSANDS/ÂΜL (ref 1.85–7.62)
NEUTS SEG NFR BLD AUTO: 57 % (ref 43–75)
NRBC BLD AUTO-RTO: 0 /100 WBCS
PLATELET # BLD AUTO: 317 THOUSANDS/UL (ref 149–390)
PMV BLD AUTO: 10.1 FL (ref 8.9–12.7)
POTASSIUM SERPL-SCNC: 4 MMOL/L (ref 3.5–5.3)
PROT SERPL-MCNC: 6.7 G/DL (ref 6.4–8.4)
RBC # BLD AUTO: 4.67 MILLION/UL (ref 3.88–5.62)
SODIUM SERPL-SCNC: 139 MMOL/L (ref 135–147)
WBC # BLD AUTO: 11.38 THOUSAND/UL (ref 4.31–10.16)

## 2025-05-12 PROCEDURE — 80053 COMPREHEN METABOLIC PANEL: CPT | Performed by: EMERGENCY MEDICINE

## 2025-05-12 PROCEDURE — 84484 ASSAY OF TROPONIN QUANT: CPT | Performed by: EMERGENCY MEDICINE

## 2025-05-12 PROCEDURE — 99285 EMERGENCY DEPT VISIT HI MDM: CPT | Performed by: EMERGENCY MEDICINE

## 2025-05-12 PROCEDURE — 85025 COMPLETE CBC W/AUTO DIFF WBC: CPT | Performed by: EMERGENCY MEDICINE

## 2025-05-12 PROCEDURE — 93005 ELECTROCARDIOGRAM TRACING: CPT

## 2025-05-12 PROCEDURE — 71046 X-RAY EXAM CHEST 2 VIEWS: CPT

## 2025-05-12 PROCEDURE — 99285 EMERGENCY DEPT VISIT HI MDM: CPT

## 2025-05-12 PROCEDURE — 36415 COLL VENOUS BLD VENIPUNCTURE: CPT

## 2025-05-12 RX ORDER — AZITHROMYCIN 250 MG/1
TABLET, FILM COATED ORAL
Qty: 6 TABLET | Refills: 0 | Status: SHIPPED | OUTPATIENT
Start: 2025-05-12 | End: 2025-05-16

## 2025-05-13 NOTE — ED PROVIDER NOTES
"Time reflects when diagnosis was documented in both MDM as applicable and the Disposition within this note       Time User Action Codes Description Comment    5/12/2025  9:12 PM Albert Shea [J20.9] Acute bronchitis     5/12/2025  9:17 PM Albert Shea [D17.21] Lipoma of right upper extremity           ED Disposition       ED Disposition   Discharge    Condition   Stable    Date/Time   Mon May 12, 2025  9:12 PM    Comment   Albert Jensen discharge to home/self care.                   Assessment & Plan       Medical Decision Making  Differential includes exacerbation of COPD, social issues at home, lipoma right shoulder chronic    Patient showed me yellow mucous he collected from coughing - he is requesting azithromycin.  Will prescribe for now as he has chronic copd    Amount and/or Complexity of Data Reviewed  Labs: ordered.  Radiology: ordered and independent interpretation performed.    Risk  Prescription drug management.             Medications - No data to display    ED Risk Strat Scores                    No data recorded        SBIRT 22yo+      Flowsheet Row Most Recent Value   Initial Alcohol Screen: US AUDIT-C     1. How often do you have a drink containing alcohol? 0 Filed at: 05/12/2025 1751   2. How many drinks containing alcohol do you have on a typical day you are drinking?  0 Filed at: 05/12/2025 1552   3b. FEMALE Any Age, or MALE 65+: How often do you have 4 or more drinks on one occassion? 0 Filed at: 05/12/2025 1556   Audit-C Score 0 Filed at: 05/12/2025 1550   ANJELICA: How many times in the past year have you...    Used an illegal drug or used a prescription medication for non-medical reasons? Never Filed at: 05/12/2025 2716                            History of Present Illness       Chief Complaint   Patient presents with    Shortness of Breath     Pt states \"I've been having SOB and I was hoping to be admitted\". Pt states \"I don't need a steroid I need an abx but they won't give me an abx\".  "       Past Medical History:   Diagnosis Date    Deafness in right ear     Hypertension       Past Surgical History:   Procedure Laterality Date    SPLENECTOMY, TOTAL        History reviewed. No pertinent family history.   Social History     Tobacco Use    Smoking status: Former     Current packs/day: 0.00     Types: Cigarettes     Quit date: 1987     Years since quittin.6    Smokeless tobacco: Never   Vaping Use    Vaping status: Never Used   Substance Use Topics    Alcohol use: Not Currently    Drug use: Not Currently      E-Cigarette/Vaping    E-Cigarette Use Never User       E-Cigarette/Vaping Substances    Nicotine No     THC No     CBD No     Flavoring No     Other No     Unknown No       I have reviewed and agree with the history as documented.     Patient has multiple complaints concerned with a lipoma chronic lipoma at his right shoulder concerned with some accounting and financial problems he is having issues with his siblings who are involved with sex with staff from the [facility].  Patient also has coughing and yellow mucus coming up for 2 weeks now he is requesting a prescription for azithromycin as that has helped in the past.        Review of Systems   Constitutional:  Negative for chills and fever.   HENT:  Negative for rhinorrhea and sore throat.    Respiratory:  Positive for cough and shortness of breath.    Cardiovascular:  Negative for chest pain.   Gastrointestinal:  Negative for constipation, diarrhea, nausea and vomiting.   Genitourinary:  Negative for dysuria and frequency.   Skin:  Negative for rash.   All other systems reviewed and are negative.          Objective       ED Triage Vitals [25 1553]   Temperature Pulse Blood Pressure Respirations SpO2 Patient Position - Orthostatic VS   98.1 °F (36.7 °C) 80 164/77 18 98 % Sitting      Temp Source Heart Rate Source BP Location FiO2 (%) Pain Score    Temporal Monitor Left arm -- 5      Vitals      Date and Time Temp Pulse SpO2 Resp BP  Pain Score FACES Pain Rating User   05/12/25 1930 -- 71 98 % 18 163/91 -- -- AM   05/12/25 1553 98.1 °F (36.7 °C) 80 98 % 18 164/77 5 -- RN            Physical Exam  Vitals and nursing note reviewed.   Constitutional:       Appearance: He is well-developed.   HENT:      Head: Normocephalic and atraumatic.      Right Ear: External ear normal.      Left Ear: External ear normal.      Nose: Nose normal.   Eyes:      Conjunctiva/sclera: Conjunctivae normal.      Pupils: Pupils are equal, round, and reactive to light.   Cardiovascular:      Rate and Rhythm: Normal rate and regular rhythm.      Heart sounds: Normal heart sounds.   Pulmonary:      Effort: Pulmonary effort is normal. No respiratory distress.      Breath sounds: Normal breath sounds. No wheezing.   Abdominal:      General: Bowel sounds are normal. There is no distension.      Palpations: Abdomen is soft.      Tenderness: There is no abdominal tenderness.   Musculoskeletal:         General: No deformity. Normal range of motion.      Cervical back: Normal range of motion and neck supple. No spinous process tenderness.      Comments: Large lipoma noted right shoulder - no erythema, rash or drainage   Skin:     General: Skin is warm and dry.      Findings: No rash.   Neurological:      General: No focal deficit present.      Mental Status: He is alert and oriented to person, place, and time.      GCS: GCS eye subscore is 4. GCS verbal subscore is 5. GCS motor subscore is 6.      Sensory: No sensory deficit.   Psychiatric:         Attention and Perception: Attention normal.         Mood and Affect: Mood normal.         Speech: Speech is tangential.         Thought Content: Thought content is not paranoid or delusional. Thought content does not include homicidal or suicidal ideation.         Cognition and Memory: Cognition normal.         Results Reviewed       Procedure Component Value Units Date/Time    HS Troponin I 2hr [[id number]]  (Normal) Collected: 05/12/25  1935    Lab Status: Final result Specimen: Blood from Arm, Right Updated: 05/12/25 2018     hs TnI 2hr 4 ng/L      Delta 2hr hsTnI -2 ng/L     HS Troponin 0hr (reflex protocol) [[id number]]  (Normal) Collected: 05/12/25 1600    Lab Status: Final result Specimen: Blood from Arm, Right Updated: 05/12/25 1629     hs TnI 0hr 6 ng/L     Comprehensive metabolic panel [[id number]]  (Abnormal) Collected: 05/12/25 1600    Lab Status: Final result Specimen: Blood from Arm, Right Updated: 05/12/25 1624     Sodium 139 mmol/L      Potassium 4.0 mmol/L      Chloride 103 mmol/L      CO2 27 mmol/L      ANION GAP 9 mmol/L      BUN 26 mg/dL      Creatinine 0.95 mg/dL      Glucose 98 mg/dL      Calcium 9.8 mg/dL      AST 13 U/L      ALT 14 U/L      Alkaline Phosphatase 85 U/L      Total Protein 6.7 g/dL      Albumin 4.3 g/dL      Total Bilirubin 0.40 mg/dL      eGFR 83 ml/min/1.73sq m     Narrative:      National Kid[employer] Disease Foundation guidelines for Chronic Kidney Disease (CKD):     Stage 1 with normal or high GFR (GFR > 90 mL/min/1.73 square meters)    Stage 2 Mild CKD (GFR = 60-89 mL/min/1.73 square meters)    Stage 3A Moderate CKD (GFR = 45-59 mL/min/1.73 square meters)    Stage 3B Moderate CKD (GFR = 30-44 mL/min/1.73 square meters)    Stage 4 Severe CKD (GFR = 15-29 mL/min/1.73 square meters)    Stage 5 End Stage CKD (GFR <15 mL/min/1.73 square meters)  Note: GFR calculation is accurate only with a steady state creatinine    CBC and differential [[id number]]  (Abnormal) Collected: 05/12/25 1600    Lab Status: Final result Specimen: Blood from Arm, Right Updated: 05/12/25 1608     WBC 11.38 Thousand/uL      RBC 4.67 Million/uL      Hemoglobin 15.1 g/dL      Hematocrit 44.7 %      MCV 96 fL      MCH 32.3 pg      MCHC 33.8 g/dL      RDW 14.2 %      MPV 10.1 fL      Platelets 317 Thousands/uL      nRBC 0 /100 WBCs      Segmented % 57 %      Immature Grans % 0 %      Lymphocytes % 27 %      Monocytes % 12 %      Eosinophils Relative  3 %      Basophils Relative 1 %      Absolute Neutrophils 6.50 Thousands/µL      Absolute Immature Grans 0.04 Thousand/uL      Absolute Lymphocytes 3.06 Thousands/µL      Absolute Monocytes 1.33 Thousand/µL      Eosinophils Absolute 0.36 Thousand/µL      Basophils Absolute 0.09 Thousands/µL             XR chest 2 views   ED Interpretation by Albert Shea DO (05/12 2051)   No acute abnl, no ptx, effusion, infiltrate, no obvious rib fracture, no widened mediastinum.  Interpreted by me                  Procedures    ED Medication and Procedure Management   Prior to Admission Medications   Prescriptions Last Dose Informant Patient Reported? Taking?   albuterol (Proventil HFA) 90 mcg/act inhaler   No No   Sig: Inhale 1-2 puffs every 4 (four) hours as needed for wheezing or shortness of breath   albuterol (Proventil HFA) 90 mcg/act inhaler   No No   Sig: Inhale 1-2 puffs every 6 (six) hours as needed for wheezing   benzonatate (TESSALON PERLES) 100 mg capsule   No No   Sig: Take 1 capsule (100 mg total) by mouth every 8 (eight) hours   budesonide-formoterol (Symbicort) 160-4.5 mcg/act inhaler   No No   Sig: Inhale 2 puffs 2 (two) times a day Rinse mouth after use.   methylPREDNISolone 4 MG tablet therapy pack   No No   Sig: Use as directed on package   metroNIDAZOLE (METROCREAM) 0.75 % cream   No No   Sig: Apply 1 Application topically 2 (two) times a day      Facility-Administered Medications: None     Discharge Medication List as of 5/12/2025  9:25 PM        START taking these medications    Details   azithromycin (ZITHROMAX) 250 mg tablet Take 2 tablets today then 1 tablet daily x 4 days, Normal           CONTINUE these medications which have NOT CHANGED    Details   !! albuterol (Proventil HFA) 90 mcg/act inhaler Inhale 1-2 puffs every 4 (four) hours as needed for wheezing or shortness of breath, Starting Tue 3/18/2025, Normal      !! albuterol (Proventil HFA) 90 mcg/act inhaler Inhale 1-2 puffs every 6 (six) hours  as needed for wheezing, Starting Thu 5/8/2025, Normal      benzonatate (TESSALON PERLES) 100 mg capsule Take 1 capsule (100 mg total) by mouth every 8 (eight) hours, Starting Sun 7/30/2023, Normal      budesonide-formoterol (Symbicort) 160-4.5 mcg/act inhaler Inhale 2 puffs 2 (two) times a day Rinse mouth after use., Starting Thu 4/10/2025, Normal      methylPREDNISolone 4 MG tablet therapy pack Use as directed on package, Normal      metroNIDAZOLE (METROCREAM) 0.75 % cream Apply 1 Application topically 2 (two) times a day, Starting Thu 6/20/2024, Normal       !! - Potential duplicate medications found. Please discuss with provider.          ED SEPSIS DOCUMENTATION   Time reflects when diagnosis was documented in both MDM as applicable and the Disposition within this note       Time User Action Codes Description Comment    5/12/2025  9:12 PM Albert Shea [J20.9] Acute bronchitis     5/12/2025  9:17 PM Albert Shea [D17.21] Lipoma of right upper extremity                  Albert Shea DO  05/13/25 0103

## 2025-05-14 LAB
ATRIAL RATE: 76 BPM
P AXIS: 48 DEGREES
PR INTERVAL: 164 MS
QRS AXIS: 44 DEGREES
QRSD INTERVAL: 72 MS
QT INTERVAL: 400 MS
QTC INTERVAL: 450 MS
T WAVE AXIS: 66 DEGREES
VENTRICULAR RATE: 76 BPM

## 2025-05-14 PROCEDURE — 93010 ELECTROCARDIOGRAM REPORT: CPT | Performed by: INTERNAL MEDICINE

## 2025-05-15 ENCOUNTER — TELEPHONE (OUTPATIENT)
Age: 65
End: 2025-05-15

## 2025-05-16 ENCOUNTER — HOSPITAL ENCOUNTER (EMERGENCY)
Facility: HOSPITAL | Age: 65
Discharge: HOME | End: 2025-05-16
Attending: EMERGENCY MEDICINE

## 2025-05-16 VITALS
SYSTOLIC BLOOD PRESSURE: 170 MMHG | OXYGEN SATURATION: 98 % | HEART RATE: 75 BPM | RESPIRATION RATE: 20 BRPM | TEMPERATURE: 98.3 F | DIASTOLIC BLOOD PRESSURE: 93 MMHG

## 2025-05-16 DIAGNOSIS — D17.21 LIPOMA OF RIGHT SHOULDER: Primary | ICD-10-CM

## 2025-05-16 PROCEDURE — 99283 EMERGENCY DEPT VISIT LOW MDM: CPT | Performed by: EMERGENCY MEDICINE

## 2025-05-16 PROCEDURE — 99282 EMERGENCY DEPT VISIT SF MDM: CPT

## 2025-05-16 NOTE — ED PROVIDER NOTES
"Time reflects when diagnosis was documented in both MDM as applicable and the Disposition within this note       Time User Action Codes Description Comment    5/16/2025  4:48 PM Yamil England Add [D17.21] Lipoma of right shoulder           ED Disposition       ED Disposition   Discharge    Condition   Stable    Date/Time   Fri May 16, 2025  4:48 PM    Comment   Albert Jensen discharge to home/self care.                   Assessment & Plan       Medical Decision Making  Patient presented to the ED today demanding to be admitted for removal of his lipoma.  Patient is followed by general surgery.  In reviewing chart, general surgery would like patient to have PCP and pulmonary clearance prior to surgery that can be done on an outpatient basis.  Patient's vital signs are stable today.  Lungs are clear to auscultation.  Patient does not have any shortness of breath.  Patient is satting 98% on room air.  Patient does not have any fevers or chills.  Patient does not have any tenderness or any signs of infection to the lipoma region on the right shoulder.  At this time I made it very clear repeatedly that patient needs to follow-up outpatient and listen to the recommendations of his physicians and follow through accordingly.  Patient is discharged home with follow-up to his pulmonologist and PCP.  Close return instructions given to return to the ER for any worsening symptoms.  Patient agrees with discharge plan.  Patient well appearing at time of discharge.    Please Note: Fluency Direct voice recognition software may have been used in the creation of this document. Wrong words or sound a like substitutions may have occurred due to the inherent limitations of the voice software.                  Medications - No data to display    ED Risk Strat Scores                    No data recorded                            History of Present Illness       Chief Complaint   Patient presents with    Mass     Pt states \"I should get " "admitted because I don't want to get discomfort. I have to get this out, but it has to be done randomly.\" Pt has a mass on the right shoulder.        Past Medical History:   Diagnosis Date    Deafness in right ear     Hypertension       Past Surgical History:   Procedure Laterality Date    SPLENECTOMY, TOTAL        History reviewed. No pertinent family history.   Social History[1]   E-Cigarette/Vaping    E-Cigarette Use Never User       E-Cigarette/Vaping Substances    Nicotine No     THC No     CBD No     Flavoring No     Other No     Unknown No       I have reviewed and agree with the history as documented.     64-year-old male with past history of hypertension, COPD, lipoma of right shoulder, presents to the ED for admission for his lipoma removal.  Patient has had numerous visits to the ED requesting admission as patient may be homeless.  Patient saw his family doctor who told him to follow-up with general surgery.  Patient initially told me he did not follow-up with general surgery.  Later he admitted that he did follow-up with general surgery.  Outpatient management with scheduled for outpatient surgical removal for the lipoma was recommended.  Patient has no new complaints today.  Patient states that where he lives the area is not clean and he does not get along with other family members therefore he needs to be admitted.  Patient is repeatedly demanding to be admitted.  Patient denies chest pain, shortness of breath, shoulder pain, fevers, chills, nausea, vomiting, diarrhea, dysuria, or any other focal neurodeficits.          Review of Systems   Constitutional:  Negative for chills and fever.   HENT:  Negative for ear pain and sore throat.    Eyes:  Negative for pain and visual disturbance.   Respiratory:  Negative for cough and shortness of breath.    Cardiovascular:  Negative for chest pain and palpitations.   Gastrointestinal:  Negative for abdominal pain and vomiting.   Genitourinary:  Negative for " dysuria and hematuria.   Musculoskeletal:  Negative for arthralgias and back pain.   Skin:  Negative for color change and rash.        Skin mass   Neurological:  Negative for seizures and syncope.   All other systems reviewed and are negative.          Objective       ED Triage Vitals [05/16/25 1625]   Temperature Pulse Blood Pressure Respirations SpO2 Patient Position - Orthostatic VS   98.3 °F (36.8 °C) 75 170/93 20 98 % Sitting      Temp Source Heart Rate Source BP Location FiO2 (%) Pain Score    Temporal Monitor Left arm -- No Pain      Vitals      Date and Time Temp Pulse SpO2 Resp BP Pain Score FACES Pain Rating User   05/16/25 1625 98.3 °F (36.8 °C) 75 98 % 20 170/93 No Pain --             Physical Exam  Vitals and nursing note reviewed.   Constitutional:       General: He is not in acute distress.     Appearance: He is well-developed.   HENT:      Head: Normocephalic and atraumatic.     Eyes:      Conjunctiva/sclera: Conjunctivae normal.       Cardiovascular:      Rate and Rhythm: Normal rate and regular rhythm.      Heart sounds: No murmur heard.  Pulmonary:      Effort: Pulmonary effort is normal. No respiratory distress.      Breath sounds: Normal breath sounds.   Abdominal:      Palpations: Abdomen is soft.      Tenderness: There is no abdominal tenderness.     Musculoskeletal:         General: No swelling.      Cervical back: Neck supple.      Comments: Large lipoma noted to right shoulder that does not appear to be infected.  No warmth to touch or tender to palpation noted.  Range of motion of both shoulders are intact.  Please see picture below for clarification of lipoma.     Skin:     General: Skin is warm and dry.      Capillary Refill: Capillary refill takes less than 2 seconds.     Neurological:      Mental Status: He is alert.     Psychiatric:         Mood and Affect: Mood normal.                 Results Reviewed       None            No orders to display       Procedures    ED Medication and  Procedure Management   Prior to Admission Medications   Prescriptions Last Dose Informant Patient Reported? Taking?   albuterol (Proventil HFA) 90 mcg/act inhaler   No No   Sig: Inhale 1-2 puffs every 4 (four) hours as needed for wheezing or shortness of breath   albuterol (Proventil HFA) 90 mcg/act inhaler   No No   Sig: Inhale 1-2 puffs every 6 (six) hours as needed for wheezing   azithromycin (ZITHROMAX) 250 mg tablet   No No   Sig: Take 2 tablets today then 1 tablet daily x 4 days   benzonatate (TESSALON PERLES) 100 mg capsule   No No   Sig: Take 1 capsule (100 mg total) by mouth every 8 (eight) hours   budesonide-formoterol (Symbicort) 160-4.5 mcg/act inhaler   No No   Sig: Inhale 2 puffs 2 (two) times a day Rinse mouth after use.   methylPREDNISolone 4 MG tablet therapy pack   No No   Sig: Use as directed on package   metroNIDAZOLE (METROCREAM) 0.75 % cream   No No   Sig: Apply 1 Application topically 2 (two) times a day      Facility-Administered Medications: None     Patient's Medications   Discharge Prescriptions    No medications on file     No discharge procedures on file.  ED SEPSIS DOCUMENTATION   Time reflects when diagnosis was documented in both MDM as applicable and the Disposition within this note       Time User Action Codes Description Comment    2025  4:48 PM Yamil England Add [D17.21] Lipoma of right shoulder                    [1]   Social History  Tobacco Use    Smoking status: Former     Current packs/day: 0.00     Types: Cigarettes     Quit date: 1987     Years since quittin.6    Smokeless tobacco: Never   Vaping Use    Vaping status: Never Used   Substance Use Topics    Alcohol use: Not Currently    Drug use: Not Currently        Yamil England DO  25 [id number]

## 2025-05-16 NOTE — ED NOTES
"Pt has soft, moveable mass to right shoulder that he states has been there \"a long time\".  No pain with palpation. Pt denies any other pain/complaints.     Catrachita Lala RN  05/16/25 6818    "

## 2025-05-22 NOTE — TELEPHONE ENCOUNTER
2nd call - Lvm for pt regarding referral received for pt to be seen with Dr Wolfe in one of our offices. Advised pt to callback to schedule this consultation if interested.

## 2025-06-04 ENCOUNTER — TELEPHONE (OUTPATIENT)
Age: 65
End: 2025-06-04

## 2025-06-04 NOTE — TELEPHONE ENCOUNTER
Faxed Medical Record request form to [facility] .[phone].  -requesting Pulmonary notes, sleep notes, and any recent hospital notes.

## 2025-06-09 ENCOUNTER — TELEPHONE (OUTPATIENT)
Age: 65
End: 2025-06-09

## 2025-06-09 NOTE — TELEPHONE ENCOUNTER
Patient returned phone call to [facility].  Patient stated he does not have Medicare.  He stated he did not have his insurance cards available.  Patient also stated that he was cancelling his appointment with pulm, patient did not give a reason

## 2025-06-10 ENCOUNTER — TELEPHONE (OUTPATIENT)
Age: 65
End: 2025-06-10

## 2025-06-10 NOTE — TELEPHONE ENCOUNTER
Pod call , wanted to confirm his appt was canceled.  I told him to call if he needed another appt.

## 2025-06-10 NOTE — TELEPHONE ENCOUNTER
Pt called to verify that his pulmonary appt was canceled/ verified yes that showing Rosy canceled it 6/9/2025 / pt then asked me to call his niece Unique and let her know appt is canceled/ stating she received mixed signals.    I did call pts niece Unique and advised her of the cancellation / she thanked me for calling / seemed surprised and said she doesn't  know why HE has her listed as an emergency contact./ and she wished me luck.

## 2025-06-20 ENCOUNTER — HOSPITAL ENCOUNTER (EMERGENCY)
Facility: HOSPITAL | Age: 65
Discharge: HOME | End: 2025-06-20
Attending: EMERGENCY MEDICINE | Admitting: EMERGENCY MEDICINE

## 2025-06-20 VITALS
OXYGEN SATURATION: 99 % | RESPIRATION RATE: 20 BRPM | TEMPERATURE: 98 F | SYSTOLIC BLOOD PRESSURE: 159 MMHG | HEART RATE: 86 BPM | DIASTOLIC BLOOD PRESSURE: 88 MMHG

## 2025-06-20 DIAGNOSIS — J44.1 COPD EXACERBATION (HCC): ICD-10-CM

## 2025-06-20 DIAGNOSIS — R05.3 CHRONIC COUGH: Primary | ICD-10-CM

## 2025-06-20 DIAGNOSIS — J20.9 ACUTE BRONCHITIS: ICD-10-CM

## 2025-06-20 PROCEDURE — 99284 EMERGENCY DEPT VISIT MOD MDM: CPT | Performed by: EMERGENCY MEDICINE

## 2025-06-20 PROCEDURE — 99281 EMR DPT VST MAYX REQ PHY/QHP: CPT

## 2025-06-20 RX ORDER — METHYLPREDNISOLONE 4 MG/1
TABLET ORAL
Qty: 21 TABLET | Refills: 0 | Status: SHIPPED | OUTPATIENT
Start: 2025-06-20

## 2025-06-20 RX ORDER — BENZONATATE 100 MG/1
100 CAPSULE ORAL EVERY 8 HOURS
Qty: 21 CAPSULE | Refills: 0 | Status: SHIPPED | OUTPATIENT
Start: 2025-06-20

## 2025-06-20 NOTE — ED PROVIDER NOTES
Time reflects when diagnosis was documented in both MDM as applicable and the Disposition within this note       Time User Action Codes Description Comment    6/20/2025  9:36 AM Brisa Brito Add [R05.3] Chronic cough     6/20/2025  9:36 AM Brisa Brito Add [J20.9] Acute bronchitis     6/20/2025  9:37 AM Brisa Brito Add [J44.1] COPD exacerbation (HCC)           ED Disposition       ED Disposition   Discharge    Condition   Stable    Date/Time   Fri Jun 20, 2025  9:37 AM    Comment   Albert Jensen discharge to home/self care.                   Assessment & Plan       Medical Decision Making  64 year old male presents for evaluation of worsening cough.  Patient refuses to see a pulmonologist.  Patient informed that he would not receive antibiotics as he does not currently have a bacterial infection.  Patient refused CXR.  Medrol dose pack and benzonatate prescribed for symptomatic management.  Patient reports he has plenty of inhalers at home.  Return precautions provided.    Risk  Prescription drug management.             Medications - No data to display    ED Risk Strat Scores                    No data recorded        SBIRT 22yo+      Flowsheet Row Most Recent Value   Initial Alcohol Screen: US AUDIT-C     1. How often do you have a drink containing alcohol? 0 Filed at: 06/20/2025 0904   2. How many drinks containing alcohol do you have on a typical day you are drinking?  0 Filed at: 06/20/2025 0904   3a. Male UNDER 65: How often do you have five or more drinks on one occasion? 0 Filed at: 06/20/2025 0904   3b. FEMALE Any Age, or MALE 65+: How often do you have 4 or more drinks on one occassion? 0 Filed at: 06/20/2025 0904   Audit-C Score 0 Filed at: 06/20/2025 0904   ANJELICA: How many times in the past year have you...    Used an illegal drug or used a prescription medication for non-medical reasons? Never Filed at: 06/20/2025 0904                            History of Present Illness       Chief  "Complaint   Patient presents with    Medication Refill     Pt reports \" I need a refill of azithromycin and my steroid\"        Past Medical History[1]   Past Surgical History[2]   Family History[3]   Social History[4]   E-Cigarette/Vaping    E-Cigarette Use Never User       E-Cigarette/Vaping Substances    Nicotine No     THC No     CBD No     Flavoring No     Other No     Unknown No       I have reviewed and agree with the history as documented.     64 year old male presents for evaluation of 1 week of worsening cough, becoming more severe over the past 2 days and preventing him from sleep.  Patient states he does not need to see a pulmonologist because he is getting sick from the environment in his home and refuses to consider that he has a chronic pulmonary condition.            Review of Systems        Objective       ED Triage Vitals [06/20/25 0830]   Temperature Pulse Blood Pressure Respirations SpO2 Patient Position - Orthostatic VS   98 °F (36.7 °C) 86 159/88 20 99 % --      Temp Source Heart Rate Source BP Location FiO2 (%) Pain Score    Temporal Monitor Left arm -- --      Vitals      Date and Time Temp Pulse SpO2 Resp BP Pain Score FACES Pain Rating User   06/20/25 0830 98 °F (36.7 °C) 86 99 % 20 159/88 -- --             Physical Exam  Vitals and nursing note reviewed.   HENT:      Head: Normocephalic and atraumatic.     Cardiovascular:      Rate and Rhythm: Normal rate and regular rhythm.      Pulses: Normal pulses.      Heart sounds: Normal heart sounds.   Pulmonary:      Effort: Pulmonary effort is normal.      Breath sounds: Rhonchi present.     Neurological:      Mental Status: He is alert.         Results Reviewed       None            No orders to display       Procedures    ED Medication and Procedure Management   Prior to Admission Medications   Prescriptions Last Dose Informant Patient Reported? Taking?   albuterol (Proventil HFA) 90 mcg/act inhaler   No No   Sig: Inhale 1-2 puffs every 4 " (four) hours as needed for wheezing or shortness of breath   albuterol (Proventil HFA) 90 mcg/act inhaler   No No   Sig: Inhale 1-2 puffs every 6 (six) hours as needed for wheezing   benzonatate (TESSALON PERLES) 100 mg capsule   No No   Sig: Take 1 capsule (100 mg total) by mouth every 8 (eight) hours   benzonatate (TESSALON PERLES) 100 mg capsule   No Yes   Sig: Take 1 capsule (100 mg total) by mouth every 8 (eight) hours   budesonide-formoterol (Symbicort) 160-4.5 mcg/act inhaler   No No   Sig: Inhale 2 puffs 2 (two) times a day Rinse mouth after use.   methylPREDNISolone 4 MG tablet therapy pack   No No   Sig: Use as directed on package   methylPREDNISolone 4 MG tablet therapy pack   No Yes   Sig: Use as directed on package   metroNIDAZOLE (METROCREAM) 0.75 % cream   No No   Sig: Apply 1 Application topically 2 (two) times a day      Facility-Administered Medications: None     Discharge Medication List as of 6/20/2025  9:38 AM        CONTINUE these medications which have CHANGED    Details   benzonatate (TESSALON PERLES) 100 mg capsule Take 1 capsule (100 mg total) by mouth every 8 (eight) hours, Starting Fri 6/20/2025, Normal      methylPREDNISolone 4 MG tablet therapy pack Use as directed on package, Normal           CONTINUE these medications which have NOT CHANGED    Details   !! albuterol (Proventil HFA) 90 mcg/act inhaler Inhale 1-2 puffs every 4 (four) hours as needed for wheezing or shortness of breath, Starting Tue 3/18/2025, Normal      !! albuterol (Proventil HFA) 90 mcg/act inhaler Inhale 1-2 puffs every 6 (six) hours as needed for wheezing, Starting Thu 5/8/2025, Normal      budesonide-formoterol (Symbicort) 160-4.5 mcg/act inhaler Inhale 2 puffs 2 (two) times a day Rinse mouth after use., Starting Thu 4/10/2025, Normal      metroNIDAZOLE (METROCREAM) 0.75 % cream Apply 1 Application topically 2 (two) times a day, Starting Thu 6/20/2024, Normal       !! - Potential duplicate medications found.  Please discuss with provider.        No discharge procedures on file.  ED SEPSIS DOCUMENTATION   Time reflects when diagnosis was documented in both MDM as applicable and the Disposition within this note       Time User Action Codes Description Comment    2025  9:36 AM Brisa Brito [R05.3] Chronic cough     2025  9:36 AM Brisa Brito [J20.9] Acute bronchitis     2025  9:37 AM Brisa Brito [J44.1] COPD exacerbation (HCC)                      [1]   Past Medical History:  Diagnosis Date    Deafness in right ear     Hypertension    [2]   Past Surgical History:  Procedure Laterality Date    SPLENECTOMY, TOTAL     [3] No family history on file.  [4]   Social History  Tobacco Use    Smoking status: Former     Current packs/day: 0.00     Types: Cigarettes     Quit date: 1987     Years since quittin.7    Smokeless tobacco: Never   Vaping Use    Vaping status: Never Used   Substance Use Topics    Alcohol use: Not Currently    Drug use: Not Currently        Brisa Brito MD  [id number]

## 2025-06-22 ENCOUNTER — HOSPITAL ENCOUNTER (EMERGENCY)
Facility: HOSPITAL | Age: 65
Discharge: HOME | End: 2025-06-22
Attending: EMERGENCY MEDICINE | Admitting: EMERGENCY MEDICINE
Payer: MEDICARE

## 2025-06-22 VITALS
HEIGHT: 67 IN | OXYGEN SATURATION: 97 % | BODY MASS INDEX: 33.74 KG/M2 | HEART RATE: 75 BPM | WEIGHT: 215 LBS | RESPIRATION RATE: 20 BRPM | TEMPERATURE: 97.6 F | DIASTOLIC BLOOD PRESSURE: 84 MMHG | SYSTOLIC BLOOD PRESSURE: 156 MMHG

## 2025-06-22 DIAGNOSIS — R05.3 CHRONIC COUGH: Primary | ICD-10-CM

## 2025-06-22 DIAGNOSIS — R06.02 SHORTNESS OF BREATH: ICD-10-CM

## 2025-06-22 PROCEDURE — 99284 EMERGENCY DEPT VISIT MOD MDM: CPT | Performed by: EMERGENCY MEDICINE

## 2025-06-22 PROCEDURE — 99284 EMERGENCY DEPT VISIT MOD MDM: CPT

## 2025-06-22 RX ORDER — BENZONATATE 100 MG/1
100 CAPSULE ORAL ONCE
Status: DISCONTINUED | OUTPATIENT
Start: 2025-06-22 | End: 2025-06-22 | Stop reason: HOSPADM

## 2025-06-22 RX ORDER — ALBUTEROL SULFATE 90 UG/1
2 INHALANT RESPIRATORY (INHALATION) ONCE
Status: DISCONTINUED | OUTPATIENT
Start: 2025-06-22 | End: 2025-06-22 | Stop reason: HOSPADM

## 2025-06-22 NOTE — ED PROVIDER NOTES
Time reflects when diagnosis was documented in both MDM as applicable and the Disposition within this note       Time User Action Codes Description Comment    6/22/2025  3:23 AM Chelsie Mena [R05.3] Chronic cough     6/22/2025  3:23 AM Chelsie Mena [R06.02] Shortness of breath           ED Disposition       ED Disposition   Discharge    Condition   Stable    Date/Time   Sun Jun 22, 2025  3:23 AM    Comment   Albert Andinochaparrita discharge to home/self care.                   Assessment & Plan       Medical Decision Making  64-year-old male presents for evaluation of continued cough, refuses workup at this point, discussed with the patient he is afebrile in no acute respiratory distress with chronic lung wheezing recommend following up with pulmonologist however patient states that he will not, he is not in distress at this point, no significant chest pain, chronic wheezing on exam, refusing to take Tessalon Perles and states that he does not want an inhaler as he already has 1 at home, discussed careful return precautions with the patient otherwise as he is unwilling to follow medical advice at this point and refusing further imaging stable for discharge at this point no indication further inpatient evaluation or treatment    Risk  Prescription drug management.             Medications   benzonatate (TESSALON PERLES) capsule 100 mg (has no administration in time range)   albuterol (PROVENTIL HFA,VENTOLIN HFA) inhaler 2 puff (2 puffs Inhalation Given 6/22/25 0329)       ED Risk Strat Scores                    No data recorded        SBIRT 22yo+      Flowsheet Row Most Recent Value   Initial Alcohol Screen: US AUDIT-C     1. How often do you have a drink containing alcohol? 0 Filed at: 06/22/2025 0310   2. How many drinks containing alcohol do you have on a typical day you are drinking?  0 Filed at: 06/22/2025 0310   3a. Male UNDER 65: How often do you have five or more drinks on one occasion? 0 Filed at: 06/22/2025 0310    Audit-C Score 0 Filed at: 06/22/2025 0310   ANJELICA: How many times in the past year have you...    Used an illegal drug or used a prescription medication for non-medical reasons? Never Filed at: 06/22/2025 0310                            History of Present Illness       Chief Complaint   Patient presents with    Shortness of Breath     Pt states having breathing problems do to his living quarters, and is also here for refills.       Past Medical History[1]   Past Surgical History[2]   Family History[3]   Social History[4]   E-Cigarette/Vaping    E-Cigarette Use Never User       E-Cigarette/Vaping Substances    Nicotine No     THC No     CBD No     Flavoring No     Other No     Unknown No       I have reviewed and agree with the history as documented.     64-year-old male with chronic cough presents for evaluation of continued cough, states that he is here to get a prescription for antibiotics and also states that he is still having trouble at home which has been his regular complaint during multiple previous visits, he is currently in no acute respiratory distress, has chronic diffuse wheezing which is apparent every time he comes into the emergency department otherwise afebrile with no tachypnea no increased work of breathing, he he refuses x-rays and refuses to go see a pulmonologist was seen in the emergency department 2 days ago and started on prednisone and Tessalon Perles which is unclear if he is taking, did offer him a chest x-ray as he states that he feels that he has a lung infection and he needs antibiotics however he is refusing chest x-ray stating that he has had too many of them already.        Review of Systems   Constitutional:  Negative for appetite change, chills and fever.   HENT:  Negative for rhinorrhea and sore throat.    Eyes:  Negative for photophobia and visual disturbance.   Respiratory:  Positive for cough and shortness of breath.    Cardiovascular:  Negative for chest pain and  palpitations.   Gastrointestinal:  Negative for abdominal pain and diarrhea.   Genitourinary:  Negative for dysuria, frequency and urgency.   Skin:  Negative for rash.   Neurological:  Negative for dizziness and weakness.   All other systems reviewed and are negative.          Objective       ED Triage Vitals [06/22/25 0310]   Temperature Pulse Blood Pressure Respirations SpO2 Patient Position - Orthostatic VS   97.6 °F (36.4 °C) 75 156/84 20 97 % Sitting      Temp src Heart Rate Source BP Location FiO2 (%) Pain Score    -- -- Left arm -- --      Vitals      Date and Time Temp Pulse SpO2 Resp BP Pain Score FACES Pain Rating User   06/22/25 0310 97.6 °F (36.4 °C) 75 97 % 20 156/84 -- -- WM            Physical Exam  Vitals and nursing note reviewed.   Constitutional:       Appearance: He is well-developed.   HENT:      Head: Normocephalic and atraumatic.      Right Ear: External ear normal.      Left Ear: External ear normal.     Eyes:      Conjunctiva/sclera: Conjunctivae normal.      Pupils: Pupils are equal, round, and reactive to light.     Neck:      Vascular: No JVD.      Trachea: No tracheal deviation.     Cardiovascular:      Rate and Rhythm: Normal rate and regular rhythm.      Heart sounds: Normal heart sounds. No murmur heard.     No friction rub. No gallop.   Pulmonary:      Effort: Pulmonary effort is normal. No respiratory distress.      Breath sounds: No stridor. Wheezing present. No rales.   Abdominal:      General: There is no distension.      Palpations: Abdomen is soft. There is no mass.      Tenderness: There is no abdominal tenderness. There is no guarding or rebound.     Musculoskeletal:         General: Normal range of motion.      Cervical back: Normal range of motion and neck supple.     Skin:     General: Skin is warm and dry.      Coloration: Skin is not pale.      Findings: No erythema or rash.     Neurological:      Mental Status: He is alert and oriented to person, place, and time.       Cranial Nerves: No cranial nerve deficit.         Results Reviewed       None            No orders to display       Procedures    ED Medication and Procedure Management   Prior to Admission Medications   Prescriptions Last Dose Informant Patient Reported? Taking?   albuterol (Proventil HFA) 90 mcg/act inhaler   No No   Sig: Inhale 1-2 puffs every 4 (four) hours as needed for wheezing or shortness of breath   albuterol (Proventil HFA) 90 mcg/act inhaler   No No   Sig: Inhale 1-2 puffs every 6 (six) hours as needed for wheezing   benzonatate (TESSALON PERLES) 100 mg capsule   No No   Sig: Take 1 capsule (100 mg total) by mouth every 8 (eight) hours   budesonide-formoterol (Symbicort) 160-4.5 mcg/act inhaler   No No   Sig: Inhale 2 puffs 2 (two) times a day Rinse mouth after use.   methylPREDNISolone 4 MG tablet therapy pack   No No   Sig: Use as directed on package   metroNIDAZOLE (METROCREAM) 0.75 % cream   No No   Sig: Apply 1 Application topically 2 (two) times a day      Facility-Administered Medications: None     Patient's Medications   Discharge Prescriptions    No medications on file     No discharge procedures on file.  ED SEPSIS DOCUMENTATION   Time reflects when diagnosis was documented in both MDM as applicable and the Disposition within this note       Time User Action Codes Description Comment    2025  3:23 AM Chelsie Mena [R05.3] Chronic cough     2025  3:23 AM Chelsie Mena [R06.02] Shortness of breath                    Chelise Mena DO  25 0332         [1]   Past Medical History:  Diagnosis Date    Deafness in right ear     Hypertension    [2]   Past Surgical History:  Procedure Laterality Date    SPLENECTOMY, TOTAL     [3] No family history on file.  [4]   Social History  Tobacco Use    Smoking status: Former     Current packs/day: 0.00     Types: Cigarettes     Quit date: 1987     Years since quittin.7    Smokeless tobacco: Never   Vaping Use    Vaping status: Never Used    Substance Use Topics    Alcohol use: Not Currently    Drug use: Not Currently        Chelsie Mena DO  06/22/25 0330

## 2025-06-24 ENCOUNTER — HOSPITAL ENCOUNTER (EMERGENCY)
Facility: HOSPITAL | Age: 65
Discharge: HOME | End: 2025-06-24
Attending: STUDENT IN AN ORGANIZED HEALTH CARE EDUCATION/TRAINING PROGRAM | Admitting: STUDENT IN AN ORGANIZED HEALTH CARE EDUCATION/TRAINING PROGRAM

## 2025-06-24 ENCOUNTER — APPOINTMENT (EMERGENCY)
Dept: RADIOLOGY | Facility: HOSPITAL | Age: 65
End: 2025-06-24

## 2025-06-24 VITALS
WEIGHT: 215 LBS | OXYGEN SATURATION: 96 % | SYSTOLIC BLOOD PRESSURE: 155 MMHG | RESPIRATION RATE: 16 BRPM | TEMPERATURE: 98 F | BODY MASS INDEX: 33.67 KG/M2 | HEART RATE: 80 BPM | DIASTOLIC BLOOD PRESSURE: 70 MMHG

## 2025-06-24 DIAGNOSIS — R03.0 ELEVATED BLOOD PRESSURE READING: ICD-10-CM

## 2025-06-24 DIAGNOSIS — J20.9 ACUTE BRONCHITIS, UNSPECIFIED ORGANISM: ICD-10-CM

## 2025-06-24 DIAGNOSIS — R05.9 COUGH: Primary | ICD-10-CM

## 2025-06-24 LAB
ANION GAP SERPL CALCULATED.3IONS-SCNC: 8 MMOL/L (ref 4–13)
BASOPHILS # BLD AUTO: 0.08 THOUSANDS/ÂΜL (ref 0–0.1)
BASOPHILS NFR BLD AUTO: 1 % (ref 0–1)
BUN SERPL-MCNC: 15 MG/DL (ref 5–25)
CALCIUM SERPL-MCNC: 9.5 MG/DL (ref 8.4–10.2)
CARDIAC TROPONIN I PNL SERPL HS: 4 NG/L (ref ?–50)
CHLORIDE SERPL-SCNC: 102 MMOL/L (ref 96–108)
CO2 SERPL-SCNC: 27 MMOL/L (ref 21–32)
CREAT SERPL-MCNC: 0.86 MG/DL (ref 0.6–1.3)
D DIMER PPP FEU-MCNC: <0.27 UG/ML FEU
EOSINOPHIL # BLD AUTO: 0.38 THOUSAND/ÂΜL (ref 0–0.61)
EOSINOPHIL NFR BLD AUTO: 3 % (ref 0–6)
ERYTHROCYTE [DISTWIDTH] IN BLOOD BY AUTOMATED COUNT: 13.2 % (ref 11.6–15.1)
GFR SERPL CREATININE-BSD FRML MDRD: 90 ML/MIN/1.73SQ M
GLUCOSE SERPL-MCNC: 99 MG/DL (ref 65–140)
HCT VFR BLD AUTO: 44.7 % (ref 36.5–49.3)
HGB BLD-MCNC: 15 G/DL (ref 12–17)
IMM GRANULOCYTES # BLD AUTO: 0.04 THOUSAND/UL (ref 0–0.2)
IMM GRANULOCYTES NFR BLD AUTO: 0 % (ref 0–2)
LYMPHOCYTES # BLD AUTO: 2.41 THOUSANDS/ÂΜL (ref 0.6–4.47)
LYMPHOCYTES NFR BLD AUTO: 22 % (ref 14–44)
MCH RBC QN AUTO: 31.9 PG (ref 26.8–34.3)
MCHC RBC AUTO-ENTMCNC: 33.6 G/DL (ref 31.4–37.4)
MCV RBC AUTO: 95 FL (ref 82–98)
MONOCYTES # BLD AUTO: 1.27 THOUSAND/ÂΜL (ref 0.17–1.22)
MONOCYTES NFR BLD AUTO: 11 % (ref 4–12)
NEUTROPHILS # BLD AUTO: 6.99 THOUSANDS/ÂΜL (ref 1.85–7.62)
NEUTS SEG NFR BLD AUTO: 63 % (ref 43–75)
NRBC BLD AUTO-RTO: 0 /100 WBCS
PLATELET # BLD AUTO: 300 THOUSANDS/UL (ref 149–390)
PMV BLD AUTO: 10.2 FL (ref 8.9–12.7)
POTASSIUM SERPL-SCNC: 3.9 MMOL/L (ref 3.5–5.3)
RBC # BLD AUTO: 4.7 MILLION/UL (ref 3.88–5.62)
SODIUM SERPL-SCNC: 137 MMOL/L (ref 135–147)
WBC # BLD AUTO: 11.17 THOUSAND/UL (ref 4.31–10.16)

## 2025-06-24 PROCEDURE — 99285 EMERGENCY DEPT VISIT HI MDM: CPT | Performed by: STUDENT IN AN ORGANIZED HEALTH CARE EDUCATION/TRAINING PROGRAM

## 2025-06-24 PROCEDURE — 93005 ELECTROCARDIOGRAM TRACING: CPT

## 2025-06-24 PROCEDURE — 71046 X-RAY EXAM CHEST 2 VIEWS: CPT

## 2025-06-24 PROCEDURE — 94640 AIRWAY INHALATION TREATMENT: CPT

## 2025-06-24 PROCEDURE — 85025 COMPLETE CBC W/AUTO DIFF WBC: CPT | Performed by: STUDENT IN AN ORGANIZED HEALTH CARE EDUCATION/TRAINING PROGRAM

## 2025-06-24 PROCEDURE — 36415 COLL VENOUS BLD VENIPUNCTURE: CPT | Performed by: STUDENT IN AN ORGANIZED HEALTH CARE EDUCATION/TRAINING PROGRAM

## 2025-06-24 PROCEDURE — 85379 FIBRIN DEGRADATION QUANT: CPT | Performed by: STUDENT IN AN ORGANIZED HEALTH CARE EDUCATION/TRAINING PROGRAM

## 2025-06-24 PROCEDURE — 99285 EMERGENCY DEPT VISIT HI MDM: CPT

## 2025-06-24 PROCEDURE — 80048 BASIC METABOLIC PNL TOTAL CA: CPT | Performed by: STUDENT IN AN ORGANIZED HEALTH CARE EDUCATION/TRAINING PROGRAM

## 2025-06-24 PROCEDURE — 84484 ASSAY OF TROPONIN QUANT: CPT | Performed by: STUDENT IN AN ORGANIZED HEALTH CARE EDUCATION/TRAINING PROGRAM

## 2025-06-24 RX ORDER — AZITHROMYCIN 250 MG/1
TABLET, FILM COATED ORAL
Qty: 6 TABLET | Refills: 0 | Status: SHIPPED | OUTPATIENT
Start: 2025-06-24 | End: 2025-06-28

## 2025-06-24 RX ORDER — IPRATROPIUM BROMIDE AND ALBUTEROL SULFATE 2.5; .5 MG/3ML; MG/3ML
3 SOLUTION RESPIRATORY (INHALATION) ONCE
Status: COMPLETED | OUTPATIENT
Start: 2025-06-24 | End: 2025-06-24

## 2025-06-24 RX ADMIN — IPRATROPIUM BROMIDE AND ALBUTEROL SULFATE 3 ML: 2.5; .5 SOLUTION RESPIRATORY (INHALATION) at 12:54

## 2025-06-24 NOTE — DISCHARGE INSTRUCTIONS
You have been seen and evaluated in the Emergency Department today for cough, shortness of breath.     Please return to the Emergency Department (ED) as soon as possible if you develop any new or concerning symptoms which include but is not limited to chest pain, shortness of breath, dizziness, nausea, vomiting, significant or persistent abdominal pain. If you are unable to drive or walk please call 911 to be safely transported to your nearest medical facility.    If you were dispensed any prescribed medications, please take the medication as directed.     Please call your primary care physician in 1-2 days for further evaluation as well as any specialist provided by calling the numbers listed below.

## 2025-06-24 NOTE — ED PROVIDER NOTES
Time reflects when diagnosis was documented in both MDM as applicable and the Disposition within this note       Time User Action Codes Description Comment    6/24/2025  1:26 PM Gypsy, Sara Add [R05.9] Cough     6/24/2025  1:45 PM Maral Betancur Add [J20.9] Acute bronchitis, unspecified organism     6/24/2025  1:47 PM GypsyMaral Add [R03.0] Elevated blood pressure reading           ED Disposition       ED Disposition   Discharge    Condition   Stable    Date/Time   Tue Jun 24, 2025  1:26 PM    Comment   Albert Jensen discharge to home/self care.                   Assessment & Plan       Medical Decision Making  64-year-old male, past medical history of hypertension, chronic wheezing/cough, multiple prior ED visits here with similar symptoms, presenting to the emergency department for continued cough, shortness of breath over the last week. AVSS, non-toxic appearing. Lungs with scattered wheezing in posterior lung fields.  Chest x-ray without acute infiltrate on my review.  Labs obtained and reviewed, D-dimer negative, troponin negative.  EKG nonischemic.  Patient reporting increasing sputum production of cloudy color.  He says the only thing that helps him is azithromycin.  He was last prescribed azithromycin in early May.  I reiterated the importance that he follow-up with the pulmonologist, he says he will think about it now.  Given his likely underlying COPD/bronchitis and report of increasing sputum production, will prescribe Z-Marciano but told patient that he is at risk for antibiotic resistance, side effects, he says he is understanding and knows all of this.  He says he did not  the Medrol Dosepak because he needs to take the Z-Marciano with it.  He says he will now take it with the azithromycin.      Pt discharged with strict return precautions and PCP follow-up. Well appearing, AVSS upon discharge. Pt verbalized understanding of discharge instructions and return precautions. All questions patient  had were answered.    Amount and/or Complexity of Data Reviewed  External Data Reviewed: notes.  Labs: ordered. Decision-making details documented in ED Course.  Radiology: ordered and independent interpretation performed. Decision-making details documented in ED Course.  ECG/medicine tests: ordered and independent interpretation performed.    Risk  Prescription drug management.        ED Course as of 06/24/25 1403   Tue Jun 24, 2025   1312 WBC(!): 11.17  11.38 2 months ago, appears chronically elevated   1326 D-Dimer, Quant: <0.27   1326 hs TnI 0hr: 4       Medications   ipratropium-albuterol (DUO-NEB) 0.5-2.5 mg/3 mL inhalation solution 3 mL (3 mL Nebulization Given 6/24/25 1254)       ED Risk Strat Scores      HEART Risk Score      Flowsheet Row Most Recent Value   Heart Score Risk Calculator    History 0 Filed at: 06/24/2025 1324   ECG 0 Filed at: 06/24/2025 1324   Age 2 Filed at: 06/24/2025 1324   Risk Factors 1 Filed at: 06/24/2025 1324   Troponin --   HEART Score --                      No data recorded         History of Present Illness       Chief Complaint   Patient presents with    Medication Refill     Pt requesting medication refills of azithromycin and steroids. States he lives with black mold and is feeling worse since running out of his medications.        Past Medical History[1]   Past Surgical History[2]   Family History[3]   Social History[4]   E-Cigarette/Vaping    E-Cigarette Use Never User       E-Cigarette/Vaping Substances    Nicotine No     THC No     CBD No     Flavoring No     Other No     Unknown No       I have reviewed and agree with the history as documented.     HPI    64-year-old male, past medical history of hypertension, chronic wheezing/cough, multiple prior ED visits here with similar symptoms, presenting to the emergency department for continued cough, shortness of breath.  He was seen here 2 days ago and refused workup, requesting azithromycin prescription.  He was seen here  6/20 with the same symptoms, prescribed medrol dose pack and tessalon perles. Patient says he is not interested in seeing a pulmonologist.  He says that the symptoms are present due to his living situation with the black mold.  He denies any chest pain at rest but reports chest pain with coughing.  Patient says he uses albuterol inhaler as needed but sparingly.  Denies any fever, chills, sick contacts.       Review of Systems      Objective       ED Triage Vitals [06/24/25 1029]   Temperature Pulse Blood Pressure Respirations SpO2 Patient Position - Orthostatic VS   98 °F (36.7 °C) 83 (!) 177/77 18 94 % Sitting      Temp Source Heart Rate Source BP Location FiO2 (%) Pain Score    Oral Monitor Left arm -- --      Vitals      Date and Time Temp Pulse SpO2 Resp BP Pain Score FACES Pain Rating User   06/24/25 1029 98 °F (36.7 °C) 83 94 % 18 177/77 -- -- ESA            Physical Exam  Vitals and nursing note reviewed.   Constitutional:       General: He is not in acute distress.     Appearance: Normal appearance. He is well-developed. He is not ill-appearing, toxic-appearing or diaphoretic.   HENT:      Head: Normocephalic and atraumatic.     Cardiovascular:      Rate and Rhythm: Normal rate and regular rhythm.   Pulmonary:      Effort: Pulmonary effort is normal. No respiratory distress.      Breath sounds: Wheezing (scattered, posterior lung field) present.     Musculoskeletal:      Cervical back: Neck supple.      Right lower leg: No edema.      Left lower leg: No edema.     Skin:     General: Skin is warm and dry.     Neurological:      General: No focal deficit present.      Mental Status: He is alert. Mental status is at baseline.         Results Reviewed       Procedure Component Value Units Date/Time    HS Troponin 0hr (reflex protocol) [[id number]]  (Normal) Collected: 06/24/25 1250    Lab Status: Final result Specimen: Blood from Arm, Left Updated: 06/24/25 1323     hs TnI 0hr 4 ng/L     HS Troponin I 2hr  [[id number]]     Lab Status: No result Specimen: Blood     D-dimer, quantitative [[id number]]  (Normal) Collected: 06/24/25 1250    Lab Status: Final result Specimen: Blood from Arm, Left Updated: 06/24/25 1320     D-Dimer, Quant <0.27 ug/ml FEU     Narrative:      In the evaluation for possible pulmonary embolism, in the appropriate (Well's Score of 4 or less) patient, the age adjusted d-dimer cutoff for this patient can be calculated as:    Age x 0.01 (in ug/mL) for Age-adjusted D-dimer exclusion threshold for a patient over 50 years.    Basic metabolic panel [[id number]] Collected: 06/24/25 1250    Lab Status: Final result Specimen: Blood from Arm, Left Updated: 06/24/25 1316     Sodium 137 mmol/L      Potassium 3.9 mmol/L      Chloride 102 mmol/L      CO2 27 mmol/L      ANION GAP 8 mmol/L      BUN 15 mg/dL      Creatinine 0.86 mg/dL      Glucose 99 mg/dL      Calcium 9.5 mg/dL      eGFR 90 ml/min/1.73sq m     Narrative:      [employer] guidelines for Chronic Kidney Disease (CKD):     Stage 1 with normal or high GFR (GFR > 90 mL/min/1.73 square meters)    Stage 2 Mild CKD (GFR = 60-89 mL/min/1.73 square meters)    Stage 3A Moderate CKD (GFR = 45-59 mL/min/1.73 square meters)    Stage 3B Moderate CKD (GFR = 30-44 mL/min/1.73 square meters)    Stage 4 Severe CKD (GFR = 15-29 mL/min/1.73 square meters)    Stage 5 End Stage CKD (GFR <15 mL/min/1.73 square meters)  Note: GFR calculation is accurate only with a steady state creatinine    CBC and differential [[id number]]  (Abnormal) Collected: 06/24/25 1250    Lab Status: Final result Specimen: Blood from Arm, Left Updated: 06/24/25 1302     WBC 11.17 Thousand/uL      RBC 4.70 Million/uL      Hemoglobin 15.0 g/dL      Hematocrit 44.7 %      MCV 95 fL      MCH 31.9 pg      MCHC 33.6 g/dL      RDW 13.2 %      MPV 10.2 fL      Platelets 300 Thousands/uL      nRBC 0 /100 WBCs      Segmented % 63 %      Immature Grans % 0 %      Lymphocytes % 22 %       Monocytes % 11 %      Eosinophils Relative 3 %      Basophils Relative 1 %      Absolute Neutrophils 6.99 Thousands/µL      Absolute Immature Grans 0.04 Thousand/uL      Absolute Lymphocytes 2.41 Thousands/µL      Absolute Monocytes 1.27 Thousand/µL      Eosinophils Absolute 0.38 Thousand/µL      Basophils Absolute 0.08 Thousands/µL             X-ray chest 2 views   ED Interpretation by Maral Betancur DO (06/24 [id number])   No acute cardiopulmonary process          ECG 12 Lead Documentation Only    Date/Time: 6/24/2025 12:39 PM    Performed by: Maral Betancur DO  Authorized by: Marla Betancur DO    Indications / Diagnosis:  Shortness of breath  ECG reviewed by me, the ED Provider: yes    Patient location:  ED  Interpretation:     Interpretation: normal    Rate:     ECG rate:  82    ECG rate assessment: normal    Rhythm:     Rhythm: sinus rhythm    Comments:      No acute STD or MONTY c/f active ischemia  No STEMI          ED Medication and Procedure Management   Prior to Admission Medications   Prescriptions Last Dose Informant Patient Reported? Taking?   albuterol (Proventil HFA) 90 mcg/act inhaler   No No   Sig: Inhale 1-2 puffs every 4 (four) hours as needed for wheezing or shortness of breath   albuterol (Proventil HFA) 90 mcg/act inhaler   No No   Sig: Inhale 1-2 puffs every 6 (six) hours as needed for wheezing   benzonatate (TESSALON PERLES) 100 mg capsule   No No   Sig: Take 1 capsule (100 mg total) by mouth every 8 (eight) hours   budesonide-formoterol (Symbicort) 160-4.5 mcg/act inhaler   No No   Sig: Inhale 2 puffs 2 (two) times a day Rinse mouth after use.   methylPREDNISolone 4 MG tablet therapy pack   No No   Sig: Use as directed on package   metroNIDAZOLE (METROCREAM) 0.75 % cream   No No   Sig: Apply 1 Application topically 2 (two) times a day      Facility-Administered Medications: None     Patient's Medications   Discharge Prescriptions    AZITHROMYCIN (ZITHROMAX) 250 MG TABLET    Take 2  tablets today then 1 tablet daily x 4 days       Start Date: 2025 End Date: 2025       Order Dose: --       Quantity: 6 tablet    Refills: 0       ED SEPSIS DOCUMENTATION   Time reflects when diagnosis was documented in both MDM as applicable and the Disposition within this note       Time User Action Codes Description Comment    2025  1:26 PM Maral Betancur [R05.9] Cough     2025  1:45 PM Maral Betancur [J20.9] Acute bronchitis, unspecified organism     2025  1:47 PM Maral Betancur [R03.0] Elevated blood pressure reading                      [1]   Past Medical History:  Diagnosis Date    Chronic cough     Deafness in right ear     Hypertension    [2]   Past Surgical History:  Procedure Laterality Date    SPLENECTOMY, TOTAL     [3] No family history on file.  [4]   Social History  Tobacco Use    Smoking status: Former     Current packs/day: 0.00     Types: Cigarettes     Quit date: 1987     Years since quittin.7    Smokeless tobacco: Never   Vaping Use    Vaping status: Never Used   Substance Use Topics    Alcohol use: Not Currently    Drug use: Not Currently        Maral Betancur DO  25

## 2025-06-25 LAB
ATRIAL RATE: 82 BPM
P AXIS: 65 DEGREES
PR INTERVAL: 174 MS
QRS AXIS: 69 DEGREES
QRSD INTERVAL: 72 MS
QT INTERVAL: 392 MS
QTC INTERVAL: 458 MS
T WAVE AXIS: 76 DEGREES
VENTRICULAR RATE: 82 BPM

## 2025-06-25 PROCEDURE — 93010 ELECTROCARDIOGRAM REPORT: CPT | Performed by: INTERNAL MEDICINE

## 2025-07-23 ENCOUNTER — APPOINTMENT (OUTPATIENT)
Age: 65
End: 2025-07-23

## 2025-07-23 ENCOUNTER — HOSPITAL ENCOUNTER (OUTPATIENT)
Age: 65
Setting detail: OBSERVATION
Discharge: HOME | End: 2025-07-24
Attending: EMERGENCY MEDICINE | Admitting: INTERNAL MEDICINE

## 2025-07-23 DIAGNOSIS — R06.02 SOB (SHORTNESS OF BREATH): ICD-10-CM

## 2025-07-23 DIAGNOSIS — I10 HYPERTENSION: ICD-10-CM

## 2025-07-23 DIAGNOSIS — R06.02 SHORTNESS OF BREATH: ICD-10-CM

## 2025-07-23 DIAGNOSIS — J44.1 COPD WITH ACUTE EXACERBATION (HCC): Primary | ICD-10-CM

## 2025-07-23 DIAGNOSIS — F20.9 SCHIZOPHRENIA, UNSPECIFIED TYPE (HCC): ICD-10-CM

## 2025-07-23 DIAGNOSIS — J20.9 ACUTE BRONCHITIS, UNSPECIFIED ORGANISM: ICD-10-CM

## 2025-07-23 DIAGNOSIS — J45.909 REACTIVE AIRWAY DISEASE WITHOUT COMPLICATION, UNSPECIFIED ASTHMA SEVERITY, UNSPECIFIED WHETHER PERSISTENT: ICD-10-CM

## 2025-07-23 LAB
ALBUMIN SERPL BCG-MCNC: 4.6 G/DL (ref 3.5–5.2)
ALP SERPL-CCNC: 100 U/L (ref 35–160)
ALT SERPL W P-5'-P-CCNC: 18 U/L (ref 5–33)
ANION GAP SERPL CALCULATED.3IONS-SCNC: 14 MMOL/L (ref 7–16)
AST SERPL W P-5'-P-CCNC: 20 U/L (ref 5–40)
BASOPHILS # BLD AUTO: 0.1 THOUSANDS/ÂΜL (ref 0–0.1)
BASOPHILS NFR BLD AUTO: 1 % (ref 0–1)
BILIRUB SERPL-MCNC: 0.39 MG/DL (ref 0.2–1.2)
BUN SERPL-MCNC: 21 MG/DL (ref 8–23)
CALCIUM SERPL-MCNC: 9.7 MG/DL (ref 8.6–10.2)
CHLORIDE SERPL-SCNC: 105 MMOL/L (ref 98–107)
CO2 SERPL-SCNC: 26 MMOL/L (ref 22–29)
CREAT SERPL-MCNC: 0.91 MG/DL (ref 0.7–1.2)
EOSINOPHIL # BLD AUTO: 0.86 THOUSAND/ÂΜL (ref 0–0.61)
EOSINOPHIL NFR BLD AUTO: 7 % (ref 0–6)
ERYTHROCYTE [DISTWIDTH] IN BLOOD BY AUTOMATED COUNT: 13.4 % (ref 11.6–15.1)
FLUAV RNA RESP QL NAA+PROBE: NEGATIVE
FLUBV RNA RESP QL NAA+PROBE: NEGATIVE
GFR SERPL CREATININE-BSD FRML MDRD: 88 ML/MIN/1.73SQ M
GLUCOSE SERPL-MCNC: 128 MG/DL (ref 65–140)
HCT VFR BLD AUTO: 44.1 % (ref 36.5–49.3)
HGB BLD-MCNC: 15 G/DL (ref 12–17)
IMM GRANULOCYTES # BLD AUTO: 0.03 THOUSAND/UL (ref 0–0.2)
IMM GRANULOCYTES NFR BLD AUTO: 0 % (ref 0–2)
LYMPHOCYTES # BLD AUTO: 2.91 THOUSANDS/ÂΜL (ref 0.6–4.47)
LYMPHOCYTES NFR BLD AUTO: 24 % (ref 14–44)
Lab: -12 NG/L (ref 6–22)
Lab: -15 NG/L (ref 6–22)
MCH RBC QN AUTO: 32 PG (ref 26.8–34.3)
MCHC RBC AUTO-ENTMCNC: 34 G/DL (ref 31.4–37.4)
MCV RBC AUTO: 94 FL (ref 82–98)
MONOCYTES # BLD AUTO: 1.41 THOUSAND/ÂΜL (ref 0.17–1.22)
MONOCYTES NFR BLD AUTO: 12 % (ref 4–12)
NEUTROPHILS # BLD AUTO: 6.67 THOUSANDS/ÂΜL (ref 1.85–7.62)
NEUTS SEG NFR BLD AUTO: 56 % (ref 43–75)
NRBC BLD AUTO-RTO: 0 /100 WBCS
PLATELET # BLD AUTO: 323 THOUSANDS/UL (ref 149–390)
PLATELET # BLD AUTO: 325 THOUSANDS/UL (ref 149–390)
PMV BLD AUTO: 10.1 FL (ref 8.9–12.7)
PMV BLD AUTO: 10.4 FL (ref 8.9–12.7)
POTASSIUM SERPL-SCNC: 3.6 MMOL/L (ref 3.5–5.1)
PROT SERPL-MCNC: 7.3 G/DL (ref 6.4–8.3)
RBC # BLD AUTO: 4.69 MILLION/UL (ref 3.88–5.62)
RSV RNA RESP QL NAA+PROBE: NEGATIVE
SARS-COV-2 RNA RESP QL NAA+PROBE: NEGATIVE
SODIUM SERPL-SCNC: 145 MMOL/L (ref 136–145)
TROPONIN T SERPL HS-MCNC: 10 NG/L (ref 6–22)
TROPONIN T SERPL HS-MCNC: 13 NG/L (ref 6–22)
TROPONIN T SERPL HS-MCNC: 25 NG/L (ref 6–22)
WBC # BLD AUTO: 11.98 THOUSAND/UL (ref 4.31–10.16)

## 2025-07-23 PROCEDURE — 94664 DEMO&/EVAL PT USE INHALER: CPT

## 2025-07-23 PROCEDURE — 84484 ASSAY OF TROPONIN QUANT: CPT | Performed by: EMERGENCY MEDICINE

## 2025-07-23 PROCEDURE — 96374 THER/PROPH/DIAG INJ IV PUSH: CPT

## 2025-07-23 PROCEDURE — 87637 SARSCOV2&INF A&B&RSV AMP PRB: CPT

## 2025-07-23 PROCEDURE — 85049 AUTOMATED PLATELET COUNT: CPT

## 2025-07-23 PROCEDURE — 99285 EMERGENCY DEPT VISIT HI MDM: CPT

## 2025-07-23 PROCEDURE — 80053 COMPREHEN METABOLIC PANEL: CPT | Performed by: EMERGENCY MEDICINE

## 2025-07-23 PROCEDURE — 85025 COMPLETE CBC W/AUTO DIFF WBC: CPT | Performed by: EMERGENCY MEDICINE

## 2025-07-23 PROCEDURE — 93005 ELECTROCARDIOGRAM TRACING: CPT | Performed by: EMERGENCY MEDICINE

## 2025-07-23 PROCEDURE — 71046 X-RAY EXAM CHEST 2 VIEWS: CPT

## 2025-07-23 PROCEDURE — 99222 1ST HOSP IP/OBS MODERATE 55: CPT | Performed by: GENERAL ACUTE CARE HOSPITAL

## 2025-07-23 PROCEDURE — 94760 N-INVAS EAR/PLS OXIMETRY 1: CPT

## 2025-07-23 RX ORDER — METHYLPREDNISOLONE SODIUM SUCCINATE 125 MG/2ML
125 INJECTION, POWDER, LYOPHILIZED, FOR SOLUTION INTRAMUSCULAR; INTRAVENOUS ONCE
Status: COMPLETED | OUTPATIENT
Start: 2025-07-23 | End: 2025-07-23

## 2025-07-23 RX ORDER — METHYLPREDNISOLONE SODIUM SUCCINATE 40 MG/ML
40 INJECTION, POWDER, LYOPHILIZED, FOR SOLUTION INTRAMUSCULAR; INTRAVENOUS EVERY 12 HOURS SCHEDULED
Status: DISCONTINUED | OUTPATIENT
Start: 2025-07-23 | End: 2025-07-24

## 2025-07-23 RX ORDER — GUAIFENESIN 600 MG/1
600 TABLET, EXTENDED RELEASE ORAL EVERY 12 HOURS SCHEDULED
Status: DISCONTINUED | OUTPATIENT
Start: 2025-07-23 | End: 2025-07-24 | Stop reason: HOSPADM

## 2025-07-23 RX ORDER — CEFTRIAXONE SODIUM 1 G/50ML
1000 INJECTION, SOLUTION INTRAVENOUS ONCE
Status: COMPLETED | OUTPATIENT
Start: 2025-07-23 | End: 2025-07-23

## 2025-07-23 RX ORDER — SODIUM CHLORIDE FOR INHALATION 0.9 %
3 VIAL, NEBULIZER (ML) INHALATION
Status: DISCONTINUED | OUTPATIENT
Start: 2025-07-23 | End: 2025-07-23

## 2025-07-23 RX ORDER — ENOXAPARIN SODIUM 100 MG/ML
40 INJECTION SUBCUTANEOUS DAILY
Status: DISCONTINUED | OUTPATIENT
Start: 2025-07-23 | End: 2025-07-24 | Stop reason: HOSPADM

## 2025-07-23 RX ORDER — IPRATROPIUM BROMIDE AND ALBUTEROL SULFATE 2.5; .5 MG/3ML; MG/3ML
3 SOLUTION RESPIRATORY (INHALATION)
Status: DISCONTINUED | OUTPATIENT
Start: 2025-07-23 | End: 2025-07-23

## 2025-07-23 RX ORDER — ALBUTEROL SULFATE 5 MG/ML
10 SOLUTION RESPIRATORY (INHALATION) ONCE
Status: COMPLETED | OUTPATIENT
Start: 2025-07-23 | End: 2025-07-23

## 2025-07-23 RX ORDER — LEVALBUTEROL INHALATION SOLUTION 1.25 MG/3ML
1.25 SOLUTION RESPIRATORY (INHALATION)
Status: DISCONTINUED | OUTPATIENT
Start: 2025-07-23 | End: 2025-07-24

## 2025-07-23 RX ORDER — ALBUTEROL SULFATE 0.83 MG/ML
2.5 SOLUTION RESPIRATORY (INHALATION) EVERY 6 HOURS PRN
Status: DISCONTINUED | OUTPATIENT
Start: 2025-07-23 | End: 2025-07-24

## 2025-07-23 RX ADMIN — IPRATROPIUM BROMIDE AND ALBUTEROL SULFATE 3 ML: .5; 3 SOLUTION RESPIRATORY (INHALATION) at 10:16

## 2025-07-23 RX ADMIN — ALBUTEROL SULFATE 10 MG: 2.5 SOLUTION RESPIRATORY (INHALATION) at 03:34

## 2025-07-23 RX ADMIN — AZITHROMYCIN 500 MG: 500 INJECTION, POWDER, LYOPHILIZED, FOR SOLUTION INTRAVENOUS at 06:27

## 2025-07-23 RX ADMIN — CEFTRIAXONE SODIUM 1000 MG: 1 INJECTION, SOLUTION INTRAVENOUS at 06:26

## 2025-07-23 RX ADMIN — METHYLPREDNISOLONE SODIUM SUCCINATE 40 MG: 40 INJECTION, POWDER, FOR SOLUTION INTRAMUSCULAR; INTRAVENOUS at 10:23

## 2025-07-23 RX ADMIN — METHYLPREDNISOLONE SODIUM SUCCINATE 125 MG: 125 INJECTION, POWDER, FOR SOLUTION INTRAMUSCULAR; INTRAVENOUS at 03:34

## 2025-07-23 RX ADMIN — IPRATROPIUM BROMIDE 1 MG: 0.5 SOLUTION RESPIRATORY (INHALATION) at 03:34

## 2025-07-23 RX ADMIN — GUAIFENESIN 600 MG: 600 TABLET ORAL at 10:17

## 2025-07-23 NOTE — ASSESSMENT & PLAN NOTE
Has been in and out of the ED   Noncompliant with pulmonary follow up, inhalers and outpatient testing  Oxygenating well on room air   WBC is 11.98, has been given steroids during this hospitalization   Recommend outpatient pulmonary follow up with pfts  Would discharge on albuterol and ICS/LABA

## 2025-07-23 NOTE — RESPIRATORY THERAPY NOTE
"RT Protocol Note  Albert Jensen 64 y.o. male MRN: [id number]  Unit/Bed#: 2 W 213-01 Encounter: [id number]    Assessment    Principal Problem:    COPD exacerbation (HCC)  Active Problems:    Acute bronchitis    Suspected reactive airway disease    Shortness of breath      Home Pulmonary Medications:  Albuterol prn       Past Medical History[1]  Social History[2]    Subjective         Objective    Physical Exam:   Assessment Type: Assess only  General Appearance: Alert, Awake  Respiratory Pattern: Normal  O2 Device: RA    Vitals:  Blood pressure 153/81, pulse 94, temperature 97.7 °F (36.5 °C), temperature source Oral, resp. rate 18, height 5' 7\" (1.702 m), weight 89.4 kg (197 lb 1.5 oz), SpO2 92%.          Imaging and other studies: Results Review Statement: I reviewed radiology reports from this admission including: chest xray.    O2 Device: RA     Plan    Respiratory Plan: Home Bronchodilator Patient pathway    Pt on room air, no resp. distress.  Will change to Xopenex Tid.                 [1]   Past Medical History:  Diagnosis Date    Chronic cough     Deafness in right ear     Hypertension    [2]   Social History  Socioeconomic History    Marital status: Single   Tobacco Use    Smoking status: Former     Current packs/day: 0.00     Types: Cigarettes     Quit date: 1987     Years since quittin.8    Smokeless tobacco: Never   Vaping Use    Vaping status: Never Used   Substance and Sexual Activity    Alcohol use: Not Currently    Drug use: Not Currently    Sexual activity: Not Currently     Social Drivers of Health     Food Insecurity: No Food Insecurity (2025)    Nursing - Inadequate Food Risk Classification     Ran Out of Food in the Last Year: Never true   Transportation Needs: No Transportation Needs (2025)    Nursing - Transportation Risk Classification     Lack of Transportation: No   Intimate Partner Violence: Unknown (2025)    Nursing IPS     Physically Hurt by Someone: No     Hurt or " Threatened by Someone: No   Housing Stability: Unknown (7/23/2025)    Nursing: Inadequate Housing Risk Classification     Unable to Pay for Housing in the Last Year: No     Has Housing: No

## 2025-07-23 NOTE — ASSESSMENT & PLAN NOTE
Continue IV azithromycin and ceftriaxone  IV Solu-Medrol 40 mg twice daily  Scheduled DuoNebs, as needed albuterol and Mucinex for supportive care  Pulmonary consult pending to further evaluate suspected reactive airway disease  Will consult psychiatry for evaluation of paranoid delusions
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
## 2025-07-23 NOTE — ASSESSMENT & PLAN NOTE
Low suspicion for acute COPD exacerbation.  Benign lung exam.  Will discontinue Solu-Medrol, no indication for steroids
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
## 2025-07-23 NOTE — ASSESSMENT & PLAN NOTE
Possible bronchitis   Would try to avoid abx given recurrent use   Patient is back to baseline respiratory status   Recommend outpatient pulmonary follow up

## 2025-07-23 NOTE — ED PROVIDER NOTES
ED Disposition       None          Assessment & Plan       Medical Decision Making  64-year-old male past medical history of hypertension presents with reports of 2 weeks of shortness of breath.  On arrival here patient noted to have significant wheezing with some mild increased work of breathing.  Review of records indicate patient's had multiple prior ED visits for wheezing.  At this time felt to benefit from bronchodilators and steroids.  During course of evaluation noted to have mild leukocytosis.  Patient did show some improvement following treatment however does appear to have persistent wheezing with tachypnea.  Given history of splenectomy and immunocompromise state felt to benefit from coverage with broad-spectrum antibiotics and will plan to observe.  Patient has noted have component of hypertension but do not suspect current hypertensive emergency    Results Reviewed     Procedure Component Value Units Date/Time    TROPONIN T 2hr ([facility] ONLY) [[id number]]  (Abnormal) Collected:   07/23/25 0446    Lab Status: Final result Specimen: Blood from Arm, Left Updated: 07/23/25   0532     hs Darwin 2hr 13 ng/L      Delta 2hr hsTnt -12 ng/L     TROPONIN T 4HR ([facility] ONLY) [[id number]]     Lab Status: No result Specimen: Blood     Comprehensive metabolic panel [[id number]] Collected: 07/23/25 0146    Lab Status: Final result Specimen: Blood from Arm, Left Updated: 07/23/25   0223     Sodium 145 mmol/L      Potassium 3.6 mmol/L      Chloride 105 mmol/L      CO2 26 mmol/L      ANION GAP 14 mmol/L      BUN 21 mg/dL      Creatinine 0.91 mg/dL      Glucose 128 mg/dL      Calcium 9.7 mg/dL      AST 20 U/L      ALT 18 U/L      Alkaline Phosphatase 100 U/L      Total Protein 7.3 g/dL      Albumin 4.6 g/dL      Total Bilirubin 0.39 mg/dL      eGFR 88 ml/min/1.73sq m     Narrative:      National Kidney Disease Foundation guidelines for Chronic Kidney Disease   (CKD):   ·  Stage 1 with normal or high GFR (GFR > 90  mL/min/1.73 square meters)  ·  Stage 2 Mild CKD (GFR = 60-89 mL/min/1.73 square meters)  ·  Stage 3A Moderate CKD (GFR = 45-59 mL/min/1.73 square meters)  ·  Stage 3B Moderate CKD (GFR = 30-44 mL/min/1.73 square meters)  ·  Stage 4 Severe CKD (GFR = 15-29 mL/min/1.73 square meters)  ·  Stage 5 End Stage CKD (GFR <15 mL/min/1.73 square meters)  Note: GFR calculation is accurate only with a steady state creatinine    Troponin T 0hr ([facility] ONLY) [[id number]]  (Abnormal) Collected:   07/23/25 0146    Lab Status: Final result Specimen: Blood from Arm, Left Updated: 07/23/25 0223     hs Darwin 0hr 25 ng/L     CBC and differential [[id number]]  (Abnormal) Collected: 07/23/25 0146    Lab Status: Final result Specimen: Blood from Arm, Left Updated: 07/23/25   0153     WBC 11.98 Thousand/uL      RBC 4.69 Million/uL      Hemoglobin 15.0 g/dL      Hematocrit 44.1 %      MCV 94 fL      MCH 32.0 pg      MCHC 34.0 g/dL      RDW 13.4 %      MPV 10.1 fL      Platelets 325 Thousands/uL      nRBC 0 /100 WBCs      Segmented % 56 %      Immature Grans % 0 %      Lymphocytes % 24 %      Monocytes % 12 %      Eosinophils Relative 7 %      Basophils Relative 1 %      Absolute Neutrophils 6.67 Thousands/µL      Absolute Immature Grans 0.03 Thousand/uL      Absolute Lymphocytes 2.91 Thousands/µL      Absolute Monocytes 1.41 Thousand/µL      Eosinophils Absolute 0.86 Thousand/µL      Basophils Absolute 0.10 Thousands/µL          Amount and/or Complexity of Data Reviewed  Labs: ordered.  Radiology: ordered and independent interpretation performed.  ECG/medicine tests: ordered and independent interpretation performed.     Details: Normal sinus rhythm, rate 73, QRS 74, QTc 440, no ST elevation or depression with no significant change compared to 6/24/2025    Risk  Prescription drug management.  Decision regarding hospitalization.        ED Course as of 07/23/25 0536   Wed Jul 23, 2025   0532 Patient reports still feeling short of breath at this  time, still noted to have persistent diffuse wheezing   0534 Dr Benz hospitalist secure chat with plans for observation       Medications   methylPREDNISolone sodium succinate (Solu-MEDROL) injection 125 mg (has no administration in time range)   albuterol inhalation solution 10 mg (has no administration in time range)   ipratropium (ATROVENT) 0.02 % inhalation solution 1 mg (has no administration in time range)       ED Risk Strat Scores                    No data recorded        SBIRT 20yo+      Flowsheet Row Most Recent Value   Initial Alcohol Screen: US AUDIT-C     1. How often do you have a drink containing alcohol? 0 Filed at: 07/23/2025 0154   2. How many drinks containing alcohol do you have on a typical day you are drinking?  0 Filed at: 07/23/2025 0154   3b. FEMALE Any Age, or MALE 65+: How often do you have 4 or more drinks on one occassion? 0 Filed at: 07/23/2025 0154   Audit-C Score 0 Filed at: 07/23/2025 0154   ANJELICA: How many times in the past year have you...    Used an illegal drug or used a prescription medication for non-medical reasons? Never Filed at: 07/23/2025 0154                            History of Present Illness       Chief Complaint   Patient presents with    Shortness of Breath     Patient believes he has a staph infection due to lower abdominal pain and a staph infection x 2 weeks       Past Medical History[1]   Past Surgical History[2]   Family History[3]   Social History[4]   E-Cigarette/Vaping    E-Cigarette Use Never User       E-Cigarette/Vaping Substances    Nicotine No     THC No     CBD No     Flavoring No     Other No     Unknown No       I have reviewed and agree with the history as documented.     Chief complaint shortness of breath.  Patient reports for the last 2 weeks been having increasing difficulty with his breathing.  Patient states that he is had a cough at times with yellow phlegm and white phlegm.  Has had some soreness across the lower part of the abdomen.   There is been no associated fever.  Reports that he has not been taking any medications or nebulizers for symptoms at home.  Has had a history of issues with his breathing in the past but denies being diagnosed with COPD or asthma.  No associated vomiting, diarrhea, chest pain.      Shortness of Breath  Associated symptoms: no abdominal pain, no chest pain, no fever, no headaches, no rash and no sore throat        Review of Systems   Constitutional:  Negative for fever.   HENT:  Negative for sore throat.    Eyes:  Negative for discharge.   Respiratory:  Positive for shortness of breath.    Cardiovascular:  Negative for chest pain.   Gastrointestinal:  Negative for abdominal pain.   Genitourinary:  Negative for dysuria.   Musculoskeletal:  Negative for back pain.   Skin:  Negative for rash.   Neurological:  Negative for headaches.   Hematological:  Does not bruise/bleed easily.   All other systems reviewed and are negative.          Objective       ED Triage Vitals [07/23/25 0114]   Temperature Pulse Blood Pressure Respirations SpO2 Patient Position - Orthostatic VS   97.5 °F (36.4 °C) 75 (!) 176/90 (!) 26 95 % Sitting      Temp Source Heart Rate Source BP Location FiO2 (%) Pain Score    Temporal Monitor Right arm -- 3      Vitals      Date and Time Temp Pulse SpO2 Resp BP Pain Score FACES Pain Rating User   07/23/25 0114 97.5 °F (36.4 °C) 75 95 % 26 176/90 3 -- RG            Physical Exam  Constitutional:       General: He is not in acute distress.  HENT:      Mouth/Throat:      Mouth: Mucous membranes are moist.     Eyes:      Conjunctiva/sclera: Conjunctivae normal.       Cardiovascular:      Rate and Rhythm: Normal rate and regular rhythm.   Pulmonary:      Effort: Tachypnea present.      Breath sounds: Wheezing present.   Abdominal:      Tenderness: There is no abdominal tenderness. There is no guarding or rebound.     Musculoskeletal:         General: Normal range of motion.      Cervical back: Normal range of  motion and neck supple. No tenderness.     Skin:     General: Skin is warm.     Neurological:      Mental Status: Mental status is at baseline.     Psychiatric:         Mood and Affect: Mood normal.         Results Reviewed       Procedure Component Value Units Date/Time    Comprehensive metabolic panel [[id number]] Collected: 07/23/25 0146    Lab Status: Final result Specimen: Blood from Arm, Left Updated: 07/23/25 0223     Sodium 145 mmol/L      Potassium 3.6 mmol/L      Chloride 105 mmol/L      CO2 26 mmol/L      ANION GAP 14 mmol/L      BUN 21 mg/dL      Creatinine 0.91 mg/dL      Glucose 128 mg/dL      Calcium 9.7 mg/dL      AST 20 U/L      ALT 18 U/L      Alkaline Phosphatase 100 U/L      Total Protein 7.3 g/dL      Albumin 4.6 g/dL      Total Bilirubin 0.39 mg/dL      eGFR 88 ml/min/1.73sq m     Narrative:      National Kidney Disease Foundation guidelines for Chronic Kidney Disease (CKD):     Stage 1 with normal or high GFR (GFR > 90 mL/min/1.73 square meters)    Stage 2 Mild CKD (GFR = 60-89 mL/min/1.73 square meters)    Stage 3A Moderate CKD (GFR = 45-59 mL/min/1.73 square meters)    Stage 3B Moderate CKD (GFR = 30-44 mL/min/1.73 square meters)    Stage 4 Severe CKD (GFR = 15-29 mL/min/1.73 square meters)    Stage 5 End Stage CKD (GFR <15 mL/min/1.73 square meters)  Note: GFR calculation is accurate only with a steady state creatinine    TROPONIN T 2hr ([facility] ONLY) [[id number]]     Lab Status: No result Specimen: Blood     Troponin T 0hr ([facility] ONLY) [[id number]]  (Abnormal) Collected: 07/23/25 0146    Lab Status: Final result Specimen: Blood from Arm, Left Updated: 07/23/25 0223     hs Darwin 0hr 25 ng/L     CBC and differential [[id number]]  (Abnormal) Collected: 07/23/25 0146    Lab Status: Final result Specimen: Blood from Arm, Left Updated: 07/23/25 0153     WBC 11.98 Thousand/uL      RBC 4.69 Million/uL      Hemoglobin 15.0 g/dL      Hematocrit 44.1 %      MCV 94 fL      MCH 32.0 pg      MCHC 34.0  g/dL      RDW 13.4 %      MPV 10.1 fL      Platelets 325 Thousands/uL      nRBC 0 /100 WBCs      Segmented % 56 %      Immature Grans % 0 %      Lymphocytes % 24 %      Monocytes % 12 %      Eosinophils Relative 7 %      Basophils Relative 1 %      Absolute Neutrophils 6.67 Thousands/µL      Absolute Immature Grans 0.03 Thousand/uL      Absolute Lymphocytes 2.91 Thousands/µL      Absolute Monocytes 1.41 Thousand/µL      Eosinophils Absolute 0.86 Thousand/µL      Basophils Absolute 0.10 Thousands/µL             XR chest 2 views   ED Interpretation by Albert Fowler MD (07/23 0304)   No active disease          Procedures    ED Medication and Procedure Management   Prior to Admission Medications   Prescriptions Last Dose Informant Patient Reported? Taking?   albuterol (Proventil HFA) 90 mcg/act inhaler   No No   Sig: Inhale 1-2 puffs every 4 (four) hours as needed for wheezing or shortness of breath   albuterol (Proventil HFA) 90 mcg/act inhaler   No No   Sig: Inhale 1-2 puffs every 6 (six) hours as needed for wheezing   benzonatate (TESSALON PERLES) 100 mg capsule   No No   Sig: Take 1 capsule (100 mg total) by mouth every 8 (eight) hours   budesonide-formoterol (Symbicort) 160-4.5 mcg/act inhaler   No No   Sig: Inhale 2 puffs 2 (two) times a day Rinse mouth after use.   methylPREDNISolone 4 MG tablet therapy pack   No No   Sig: Use as directed on package   metroNIDAZOLE (METROCREAM) 0.75 % cream   No No   Sig: Apply 1 Application topically 2 (two) times a day      Facility-Administered Medications: None     Patient's Medications   Discharge Prescriptions    No medications on file     No discharge procedures on file.  ED SEPSIS DOCUMENTATION                [1]   Past Medical History:  Diagnosis Date    Chronic cough     Deafness in right ear     Hypertension    [2]   Past Surgical History:  Procedure Laterality Date    SPLENECTOMY, TOTAL     [3] No family history on file.  [4]   Social History  Tobacco Use    Smoking  status: Former     Current packs/day: 0.00     Types: Cigarettes     Quit date: 1987     Years since quittin.8    Smokeless tobacco: Never   Vaping Use    Vaping status: Never Used   Substance Use Topics    Alcohol use: Not Currently    Drug use: Not Currently        Albert Fowelr MD  [phone]

## 2025-07-23 NOTE — CONSULTS
Consultation - Pulmonology   Name: Albert Jensen 64 y.o. male I MRN: [id number]  Unit/Bed#: 2 W 213-01 I Date of Admission: 7/23/2025   Date of Service: 7/23/2025 I Hospital Day: 0   Consults  Physician Requesting Evaluation: Richard Benz MD   Reason for Evaluation / Principal Problem: SOB     Assessment & Plan  Acute bronchitis  Possible bronchitis   Would try to avoid abx given recurrent use   Patient is back to baseline respiratory status   Recommend outpatient pulmonary follow up   Suspected reactive airway disease  Has been in and out of the ED   Noncompliant with pulmonary follow up, inhalers and outpatient testing  Oxygenating well on room air   WBC is 11.98, has been given steroids during this hospitalization   Recommend outpatient pulmonary follow up with pfts  Would discharge on albuterol and ICS/LABA     Shortness of breath  Resolved           History of Present Illness   Albert Jensen is a 64 y.o. male who presents with SOB x 2 weeks.  He reported productive cough with yellow sputum overnight but has not had it this morning.  He denies wheezing.  He is maintaining his oxygen saturation on room air.  Patient states that he does not have any lung problems.  He takes albuterol HFA outpatient about once every 6 weeks.  He does not take any other inhalers at home.  He is not on oxygen.  When feeling at baseline he denies shortness of breath.  Patient believes he has mold and dust in the apartment where he lives which bothers his breathing.  He is a former smoker. He smoked 1-2 ppd for 10 years and quit 40 years ago. He does not see a pulmonologist.     Patient notes that he continues to get sick and needs to be prescribed albuterol and prednisone about monthly for the last year.  He gets them refilled by [facility] reportedly.    When reviewing the patient's chart he has multiple ED visits for a lipoma and recurrent shortness of breath.  It was recommended that patient see a  pulmonologist outpatient but he has yet to do so.  He has been prescribed many courses of prednisone and azithromycin in the last year.  In April of this year he was seen by pulmonology inpatient and was prescribed Symbicort albuterol HFA and albuterol nebulizers.  Eos were elevated at that time. It was recommended he get an allergy panel, PFT testing with outpatient follow-up.  I was not able to find any records of these in the chart. He was also encouraged to clean his home or find another place to live.    Review of Systems  As above   Historical Information       Objective :  Temp:  [96.3 °F (35.7 °C)-97.8 °F (36.6 °C)] 97.7 °F (36.5 °C)  HR:  [75-94] 94  BP: (153-180)/(81-90) 153/81  Resp:  [18-34] 18  SpO2:  [95 %-96 %] 96 %  O2 Device: None (Room air)  Nasal Cannula O2 Flow Rate (L/min):  [2 L/min] 2 L/min    Physical Exam  General: Well appearing, in no distress.   Heart: Regular rate    Lungs: No respiratory distress, on room air, clear  Abdomen: Soft, Round, non tender   Extremities: Moves all extremities freely  Neurologic: Awake, Alert & Oriented X 3     Lab Results: I have reviewed the following results:  .     07/23/25  0146 07/23/25  1021   WBC 11.98*  --    HGB 15.0  --    HCT 44.1  --     323   SODIUM 145  --    K 3.6  --      --    CO2 26  --    BUN 21  --    CREATININE 0.91  --    GLUC 128  --    AST 20  --    ALT 18  --    ALB 4.6  --    TBILI 0.39  --    ALKPHOS 100  --      ABG: No new results in last 24 hours.

## 2025-07-23 NOTE — ASSESSMENT & PLAN NOTE
65 yo man with documented h/o schizophrenia presented to [facility] for c/o cough and SOB dx'd with COPD vs asthma and seen by psychiatry for concerns about paranoia.  At present patient seems to be cooperating with his medical care appropriately even if he does verbalize delusions.  While the patient is fairly paranoid about his living situation and seems to have some scattered somatic preoccupations, he doesn't appear to meet inpatient psych criteria at this time.  Biggest issue with his ongoing paranoia is that he seems to fixate on illogical or irrelevant issues (flushing Albuterol out of his body fat) and refuses things that may actually help him (eg, applying for Medicare and getting pulmonology follow-up as an outpatient).      PLAN:  If patient becomes agitated during hospital course can offer prn Seroquel or IM Zyprexa.  Capacity issues may arise over time and necessitate the involvement of his POA whom patient claims is his physician Unique massey.  At present no such issue seems to exist but please request psych for re-eval if needed regarding patient's medical or dispo needs.

## 2025-07-24 VITALS
DIASTOLIC BLOOD PRESSURE: 61 MMHG | BODY MASS INDEX: 30.93 KG/M2 | RESPIRATION RATE: 16 BRPM | HEIGHT: 67 IN | SYSTOLIC BLOOD PRESSURE: 132 MMHG | TEMPERATURE: 97.6 F | OXYGEN SATURATION: 96 % | WEIGHT: 197.09 LBS | HEART RATE: 79 BPM

## 2025-07-24 PROBLEM — R06.02 SHORTNESS OF BREATH: Status: RESOLVED | Noted: 2024-06-18 | Resolved: 2025-07-24

## 2025-07-24 PROBLEM — J44.1 COPD EXACERBATION (HCC): Status: RESOLVED | Noted: 2025-07-23 | Resolved: 2025-07-24

## 2025-07-24 PROCEDURE — 87205 SMEAR GRAM STAIN: CPT

## 2025-07-24 PROCEDURE — 94664 DEMO&/EVAL PT USE INHALER: CPT

## 2025-07-24 PROCEDURE — 94760 N-INVAS EAR/PLS OXIMETRY 1: CPT

## 2025-07-24 PROCEDURE — 87070 CULTURE OTHR SPECIMN AEROBIC: CPT

## 2025-07-24 RX ORDER — AZITHROMYCIN 250 MG/1
250 TABLET, FILM COATED ORAL EVERY 24 HOURS
Qty: 3 TABLET | Refills: 0 | Status: SHIPPED | OUTPATIENT
Start: 2025-07-24 | End: 2025-07-27

## 2025-07-24 RX ORDER — AZITHROMYCIN 250 MG/1
250 TABLET, FILM COATED ORAL EVERY 24 HOURS
Status: DISCONTINUED | OUTPATIENT
Start: 2025-07-24 | End: 2025-07-24 | Stop reason: HOSPADM

## 2025-07-24 RX ORDER — ALBUTEROL SULFATE 0.83 MG/ML
2.5 SOLUTION RESPIRATORY (INHALATION) EVERY 6 HOURS PRN
Qty: 30 ML | Refills: 0 | Status: SHIPPED | OUTPATIENT
Start: 2025-07-24

## 2025-07-24 RX ORDER — LEVALBUTEROL INHALATION SOLUTION 1.25 MG/3ML
1.25 SOLUTION RESPIRATORY (INHALATION) EVERY 6 HOURS PRN
Status: DISCONTINUED | OUTPATIENT
Start: 2025-07-24 | End: 2025-07-24 | Stop reason: HOSPADM

## 2025-07-24 RX ADMIN — AZITHROMYCIN 250 MG: 250 TABLET, FILM COATED ORAL at 10:28

## 2025-07-24 NOTE — DISCHARGE INSTR - AVS FIRST PAGE
Recommend outpatient pulmonary function testing to be ordered by your primary care provider or pulmonologist.

## 2025-07-24 NOTE — DISCHARGE SUMMARY
Discharge Summary - Hospitalist   Name: Albert Jensen 64 y.o. male I MRN: [id number]  Unit/Bed#: 2 W 213-01 I Date of Admission: 7/23/2025   Date of Service: 7/24/2025 I Hospital Day: 0     Assessment & Plan  Acute bronchitis  Continue IV azithromycin and ceftriaxone  IV Solu-Medrol 40 mg twice daily  Scheduled DuoNebs, as needed albuterol and Mucinex for supportive care  Pulmonary consult pending to further evaluate suspected reactive airway disease  Will consult psychiatry for evaluation of paranoid delusions  Suspected reactive airway disease  Pulmonary consult pending to further evaluate  Schizophrenia (HCC)       Medical Problems       Resolved Problems  Date Reviewed: 9/6/2024          Resolved    Shortness of breath 7/24/2025     Resolved by  BRENTON Pace    * (Principal) COPD exacerbation (HCC) 7/24/2025     Resolved by  BRENTON Pace        Discharging Physician / Practitioner: BRENTON Pace  PCP: Renzo Grullon MD  Admission Date:   Admission Orders (From admission, onward)       Ordered        07/23/25 0536  Place in Observation  Once                          Discharge Date: 07/24/25    Next Steps for Physician/AP Assuming Care:  Patient instructed to follow-up outpatient with pulmonology for pulmonary function testing.    Hospital Course:   Albert Jensen is a 64 y.o. male patient who originally presented to the hospital on 7/23/2025 due to shortness of breath and cough.  Patient admitted for management of acute bronchitis and suspected reactive airway disease.  Patient initially started on IV Solu-Medrol for suspected COPD exacerbation, COPD exacerbation ruled out and Solu-Medrol discontinued.  Patient received IV azithromycin and ceftriaxone for acute bronchitis discharged on azithromycin 250 mg for 3 additional days.  Cough improved at discharge.  Patient instructed to follow-up with pulmonary outpatient for pulmonary function testing.  Patient states he does not want to see a pulmonologist.   "Patient repeatedly suspected his respiratory symptoms were from poisoning from his place of residence, apartment [facility] where he lives.  Patient was evaluated by psychiatry for paranoid thoughts and conspiracies, no acute recommendations per psychiatry.  Patient remained afebrile.  No leukocytosis.  Imaging unrevealing for infection.  Patient medically stable for discharge home today.  He will have recommended follow-up with the pulmonologist in 1 week.  Patient provided referral for outpatient surgery for right shoulder lipoma.          Please see above list of diagnoses and related plan for additional information.     Discharge Day Visit / Exam:   Subjective: Patient seen and examined at bedside.  He reports infrequent cough with some sputum production this morning.  Denies chest pain or shortness of breath.  He is requesting a surgical evaluation for right shoulder lipoma.  Discussed this can be evaluated outpatient.  Patient continues to verbalize he is being poisoned with respiratory toxins at his apartment complex.  Vitals: Blood Pressure: 132/61 (07/24/25 0830)  Pulse: 79 (07/24/25 0830)  Temperature: 97.6 °F (36.4 °C) (07/24/25 1100)  Temp Source: Oral (07/24/25 0830)  Respirations: 16 (07/24/25 0830)  Height: 5' 7\" (170.2 cm) (07/23/25 1100)  Weight - Scale: 89.4 kg (197 lb 1.5 oz) (07/23/25 1100)  SpO2: 96 % (07/24/25 0830)  Physical Exam  Vitals and nursing note reviewed.   Constitutional:       General: He is not in acute distress.     Appearance: He is well-developed.   HENT:      Head: Normocephalic and atraumatic.     Eyes:      Conjunctiva/sclera: Conjunctivae normal.       Cardiovascular:      Rate and Rhythm: Normal rate and regular rhythm.      Heart sounds: No murmur heard.  Pulmonary:      Effort: Pulmonary effort is normal. No respiratory distress.      Breath sounds: Normal breath sounds.   Abdominal:      Palpations: Abdomen is soft.      Tenderness: There is no abdominal tenderness. "     Musculoskeletal:         General: No swelling.      Cervical back: Neck supple.     Skin:     General: Skin is warm and dry.      Capillary Refill: Capillary refill takes less than 2 seconds.      Comments: Large right shoulder lipoma     Neurological:      Mental Status: He is alert.     Psychiatric:         Mood and Affect: Mood normal.          Discussion with Family: Plan of care and disposition discussed with patient at bedside..     Discharge instructions/Information to patient and family:   See after visit summary for information provided to patient and family.      Provisions for Follow-Up Care:  See after visit summary for information related to follow-up care and any pertinent home health orders.      Mobility at time of Discharge:   Basic Mobility Inpatient Raw Score: 24  JH-HLM Goal: 8: Walk 250 feet or more  JH-HLM Achieved: 7: Walk 25 feet or more  HLM Goal achieved. Continue to encourage appropriate mobility.     Disposition:   Home    Planned Readmission: No    Administrative Statements   Discharge Statement:  I have spent a total time of 35   minutes in caring for this patient on the day of the visit/encounter. .    **Please Note: This note may have been constructed using a voice recognition system**

## 2025-07-24 NOTE — RESPIRATORY THERAPY NOTE
"RT Protocol Note  Albert Jensen 64 y.o. male MRN: [id number]  Unit/Bed#: 2 W 213-01 Encounter: [id number]    Assessment    Active Problems:    Acute bronchitis    Suspected reactive airway disease    Schizophrenia (HCC)      Home Pulmonary Medications:  No home meds        Past Medical History[1]  Social History[2]    Subjective         Objective    Physical Exam:   Assessment Type: Assess only  General Appearance: Alert, Awake  Respiratory Pattern: Normal  Chest Assessment: Chest expansion symmetrical  Bilateral Breath Sounds: Diminished, Clear  Cough: Non-productive  O2 Device: ra    Vitals:  Blood pressure 132/61, pulse 79, temperature 97.9 °F (36.6 °C), temperature source Oral, resp. rate 16, height 5' 7\" (1.702 m), weight 89.4 kg (197 lb 1.5 oz), SpO2 96%.          Imaging and other studies:   No x-ray     O2 Device: ra     Plan    Respiratory Plan: Home Bronchodilator Patient pathway                     [1]   Past Medical History:  Diagnosis Date    Chronic cough     Deafness in right ear     Hypertension    [2]   Social History  Socioeconomic History    Marital status: Single   Tobacco Use    Smoking status: Former     Current packs/day: 0.00     Types: Cigarettes     Quit date: 1987     Years since quittin.8    Smokeless tobacco: Never   Vaping Use    Vaping status: Never Used   Substance and Sexual Activity    Alcohol use: Not Currently    Drug use: Not Currently    Sexual activity: Not Currently     Social Drivers of Health     Food Insecurity: No Food Insecurity (2025)    Nursing - Inadequate Food Risk Classification     Ran Out of Food in the Last Year: Never true   Transportation Needs: No Transportation Needs (2025)    Nursing - Transportation Risk Classification     Lack of Transportation: No   Intimate Partner Violence: Unknown (2025)    Nursing IPS     Physically Hurt by Someone: No     Hurt or Threatened by Someone: No   Housing Stability: Unknown (2025)    Nursing: " Inadequate Housing Risk Classification     Unable to Pay for Housing in the Last Year: No     Has Housing: No

## 2025-07-24 NOTE — PLAN OF CARE
Problem: INFECTION - ADULT  Goal: Absence or prevention of progression during hospitalization  Description: INTERVENTIONS:  - Assess and monitor for signs and symptoms of infection  - Monitor lab/diagnostic results  - Monitor all insertion sites, i.e. indwelling lines, tubes, and drains  - Monitor endotracheal if appropriate and nasal secretions for changes in amount and color  - [place] appropriate cooling/warming therapies per order  - Administer medications as ordered  - Instruct and encourage patient and family to use good hand hygiene technique  - Identify and instruct in appropriate isolation precautions for identified infection/condition  Outcome: Adequate for Discharge

## 2025-07-24 NOTE — PLAN OF CARE
Problem: INFECTION - ADULT  Goal: Absence or prevention of progression during hospitalization  Description: INTERVENTIONS:  - Assess and monitor for signs and symptoms of infection  - Monitor lab/diagnostic results  - Monitor all insertion sites, i.e. indwelling lines, tubes, and drains  - Monitor endotracheal if appropriate and nasal secretions for changes in amount and color  - [place] appropriate cooling/warming therapies per order  - Administer medications as ordered  - Instruct and encourage patient and family to use good hand hygiene technique  - Identify and instruct in appropriate isolation precautions for identified infection/condition  7/24/2025 0703 by Gisselle Rodriguez RN  Outcome: Progressing  7/24/2025 0208 by Gisselle Rodriguez RN  Outcome: Adequate for Discharge

## 2025-07-24 NOTE — CASE MANAGEMENT
Case Management Assessment & Discharge Planning Note    Patient name Albert Jensen  Location 2 W 213/2 W 213-01 MRN [id number]  : 1960 Date 2025       Current Admission Date: 2025  Current Admission Diagnosis:Acute bronchitis   Patient Active Problem List    Diagnosis Date Noted    Moderate persistent asthma with acute exacerbation 04/10/2025    Lipoma of right upper extremity 04/10/2025    Rosacea 2024    Skin tumor 2024    Elevated blood pressure reading 2024    Varicose veins of both lower extremities 2021    Acute bronchitis 2021    Suspected reactive airway disease 2021    Schizophrenia (HCC) 2021    Hypokalemia 2021      LOS (days): 0  Geometric Mean LOS (GMLOS) (days):   Days to GMLOS:     OBJECTIVE:              Current admission status: Observation       Preferred Pharmacy:   [id number] - [place] - [phone]   [place] [id number]  Phone: [phone] Fax: [phone]    Primary Care Provider: Renzo Grullon MD    Primary Insurance:   Secondary Insurance:     ASSESSMENT:  Active Health Care Proxies    There are no active Health Care Proxies on file.            Obs Notice Signed: 25    Readmission Root Cause  30 Day Readmission: No    Patient Information  Admitted from:: Home  Assessment information provided by:: Patient  Primary Caregiver: Self  Support Systems: Self  County of Residence: [place]  What [facility] do you live in?: [place]  Home entry access options. Select all that apply.:  (1 Step to enter)  Living Arrangements: Lives Alone  Is patient a ?: No    Activities of Daily Living Prior to Admission  Functional Status: Independent  Completes ADLs independently?: Yes  Ambulates independently?: Yes  Does patient use assisted devices?: No  Does patient currently own DME?: No  Does patient have a history of Outpatient Therapy (PT/OT)?: No  Does the patient have a history of Short-Term Rehab?: No  Does  "patient have a history of HHC?: No  Does patient currently have HHC?: No         Patient Information Continued  Income Source: Unemployed  Does patient have prescription coverage?: Yes  Can the patient afford their medications and any related supplies (such as glucometers or test strips)?: Yes  Does patient receive dialysis treatments?: No         Means of Transportation  Means of Transport to Appts:: Drives Self          DISCHARGE DETAILS:        CM met w/ pt at bedside to complete discharge planning assessment. Pt confirmed demographics. Pt reported he resides at home alone. Pt reported he has one step to enter home.CM received update from MD, pt has some concerns about housing. Pt  reported he cannot return home and will need resources. Pt went on to state \"I think I'll just end up staying here. Like a juan pablo in the box.\" CM attempted to provide pt w/ housing and shelter resources. Pt declined resources and reported \"I am too elite to be in a shelter.\" Pt denies any HHC services or SNF stays. Pt denies utilizing DME. Pt confirmed he will have transport at time of d/c. CM will continue to follow for safe d/c.                                                                                                                  "

## 2025-07-25 LAB
ATRIAL RATE: 73 BPM
P AXIS: 70 DEGREES
PR INTERVAL: 178 MS
QRS AXIS: 21 DEGREES
QRSD INTERVAL: 74 MS
QT INTERVAL: 400 MS
QTC INTERVAL: 440 MS
T WAVE AXIS: 45 DEGREES
VENTRICULAR RATE: 73 BPM

## 2025-07-25 PROCEDURE — 93010 ELECTROCARDIOGRAM REPORT: CPT | Performed by: INTERNAL MEDICINE

## 2025-07-26 LAB
BACTERIA SPT RESP CULT: ABNORMAL
GRAM STN SPEC: ABNORMAL

## 2025-08-17 ENCOUNTER — HOSPITAL ENCOUNTER (EMERGENCY)
Age: 65
Discharge: HOME | End: 2025-08-17
Attending: EMERGENCY MEDICINE | Admitting: EMERGENCY MEDICINE

## 2025-08-17 ENCOUNTER — APPOINTMENT (OUTPATIENT)
Age: 65
End: 2025-08-17

## 2025-08-17 VITALS
SYSTOLIC BLOOD PRESSURE: 161 MMHG | HEIGHT: 67 IN | TEMPERATURE: 97.6 F | HEART RATE: 85 BPM | BODY MASS INDEX: 32.04 KG/M2 | RESPIRATION RATE: 11 BRPM | DIASTOLIC BLOOD PRESSURE: 86 MMHG | OXYGEN SATURATION: 95 % | WEIGHT: 204.15 LBS

## 2025-08-17 DIAGNOSIS — L71.9 ROSACEA: ICD-10-CM

## 2025-08-17 DIAGNOSIS — J40 BRONCHITIS: Primary | ICD-10-CM

## 2025-08-17 LAB
FLUAV RNA RESP QL NAA+PROBE: NEGATIVE
FLUBV RNA RESP QL NAA+PROBE: NEGATIVE
RSV RNA RESP QL NAA+PROBE: NEGATIVE
SARS-COV-2 RNA RESP QL NAA+PROBE: NEGATIVE

## 2025-08-17 PROCEDURE — 87637 SARSCOV2&INF A&B&RSV AMP PRB: CPT

## 2025-08-17 PROCEDURE — 71046 X-RAY EXAM CHEST 2 VIEWS: CPT

## 2025-08-17 PROCEDURE — 99285 EMERGENCY DEPT VISIT HI MDM: CPT

## 2025-08-17 PROCEDURE — 94640 AIRWAY INHALATION TREATMENT: CPT

## 2025-08-17 RX ORDER — ALBUTEROL SULFATE 0.83 MG/ML
2.5 SOLUTION RESPIRATORY (INHALATION) ONCE
Status: COMPLETED | OUTPATIENT
Start: 2025-08-17 | End: 2025-08-17

## 2025-08-17 RX ORDER — AZITHROMYCIN 250 MG/1
TABLET, FILM COATED ORAL
Qty: 5 TABLET | Refills: 0 | Status: SHIPPED | OUTPATIENT
Start: 2025-08-17 | End: 2025-08-23

## 2025-08-17 RX ORDER — AZITHROMYCIN 500 MG/1
500 TABLET, FILM COATED ORAL ONCE
Status: COMPLETED | OUTPATIENT
Start: 2025-08-17 | End: 2025-08-17

## 2025-08-17 RX ADMIN — AZITHROMYCIN 500 MG: 500 TABLET, FILM COATED ORAL at 13:58

## 2025-08-17 RX ADMIN — ALBUTEROL SULFATE 2.5 MG: 2.5 SOLUTION RESPIRATORY (INHALATION) at 11:29

## 2025-08-19 PROBLEM — J96.01 ACUTE HYPOXIC RESPIRATORY FAILURE (HCC): Status: ACTIVE | Noted: 2025-08-19

## 2025-08-19 PROBLEM — R73.9 HYPERGLYCEMIA: Status: ACTIVE | Noted: 2025-08-19

## 2025-08-20 PROBLEM — I10 ESSENTIAL HYPERTENSION: Status: ACTIVE | Noted: 2024-06-18
